# Patient Record
Sex: FEMALE | Race: WHITE
[De-identification: names, ages, dates, MRNs, and addresses within clinical notes are randomized per-mention and may not be internally consistent; named-entity substitution may affect disease eponyms.]

---

## 2017-01-23 ENCOUNTER — HOSPITAL ENCOUNTER (INPATIENT)
Dept: HOSPITAL 47 - EC | Age: 72
LOS: 7 days | Discharge: HOME | DRG: 885 | End: 2017-01-30
Payer: MEDICARE

## 2017-01-23 VITALS — BODY MASS INDEX: 28.8 KG/M2

## 2017-01-23 DIAGNOSIS — G47.00: ICD-10-CM

## 2017-01-23 DIAGNOSIS — F20.0: Primary | ICD-10-CM

## 2017-01-23 DIAGNOSIS — E66.9: ICD-10-CM

## 2017-01-23 DIAGNOSIS — Z79.899: ICD-10-CM

## 2017-01-23 DIAGNOSIS — I11.9: ICD-10-CM

## 2017-01-23 DIAGNOSIS — F41.9: ICD-10-CM

## 2017-01-23 DIAGNOSIS — N39.0: ICD-10-CM

## 2017-01-23 DIAGNOSIS — F32.9: ICD-10-CM

## 2017-01-23 DIAGNOSIS — I95.9: ICD-10-CM

## 2017-01-23 DIAGNOSIS — I87.2: ICD-10-CM

## 2017-01-23 DIAGNOSIS — L30.9: ICD-10-CM

## 2017-01-23 LAB
ALP SERPL-CCNC: 76 U/L (ref 38–126)
ALT SERPL-CCNC: 30 U/L (ref 9–52)
ANION GAP SERPL CALC-SCNC: 13 MMOL/L
AST SERPL-CCNC: 23 U/L (ref 14–36)
BASOPHILS # BLD AUTO: 0.1 K/UL (ref 0–0.2)
BASOPHILS NFR BLD AUTO: 1 %
BUN SERPL-SCNC: 10 MG/DL (ref 7–17)
CALCIUM SPEC-MCNC: 10 MG/DL (ref 8.4–10.2)
CH: 29.5
CHCM: 33.6
CHLORIDE SERPL-SCNC: 107 MMOL/L (ref 98–107)
CO2 SERPL-SCNC: 23 MMOL/L (ref 22–30)
EOSINOPHIL # BLD AUTO: 0.2 K/UL (ref 0–0.7)
EOSINOPHIL NFR BLD AUTO: 2 %
ERYTHROCYTE [DISTWIDTH] IN BLOOD BY AUTOMATED COUNT: 4.15 M/UL (ref 3.8–5.4)
ERYTHROCYTE [DISTWIDTH] IN BLOOD: 15 % (ref 11.5–15.5)
GLUCOSE SERPL-MCNC: 129 MG/DL (ref 74–99)
HCT VFR BLD AUTO: 36.5 % (ref 34–46)
HDW: 2.48
HGB BLD-MCNC: 12.2 GM/DL (ref 11.4–16)
LUC NFR BLD AUTO: 3 %
LYMPHOCYTES # SPEC AUTO: 1.9 K/UL (ref 1–4.8)
LYMPHOCYTES NFR SPEC AUTO: 23 %
MCH RBC QN AUTO: 29.4 PG (ref 25–35)
MCHC RBC AUTO-ENTMCNC: 33.4 G/DL (ref 31–37)
MCV RBC AUTO: 88 FL (ref 80–100)
MONOCYTES # BLD AUTO: 0.5 K/UL (ref 0–1)
MONOCYTES NFR BLD AUTO: 6 %
NEUTROPHILS # BLD AUTO: 5.4 K/UL (ref 1.3–7.7)
NEUTROPHILS NFR BLD AUTO: 65 %
NON-AFRICAN AMERICAN GFR(MDRD): >60
PARTICLE COUNT: 1776
PH UR: 6 [PH] (ref 5–8)
PH UR: 6 [PH] (ref 5–8)
POTASSIUM SERPL-SCNC: 4.1 MMOL/L (ref 3.5–5.1)
PROT SERPL-MCNC: 7.4 G/DL (ref 6.3–8.2)
RBC UR QL: <1 /HPF (ref 0–5)
SODIUM SERPL-SCNC: 143 MMOL/L (ref 137–145)
SP GR UR: 1.01 (ref 1–1.03)
SP GR UR: 1.01 (ref 1–1.03)
UA BILLING (MACRO VS. MICRO): (no result)
UA BILLING (MACRO VS. MICRO): (no result)
UROBILINOGEN UR QL STRIP: <2 MG/DL (ref ?–2)
UROBILINOGEN UR QL STRIP: <2 MG/DL (ref ?–2)
WBC # BLD AUTO: 0.22 10*3/UL
WBC # BLD AUTO: 8.4 K/UL (ref 3.8–10.6)
WBC #/AREA URNS HPF: 31 /HPF (ref 0–5)
WBC (PEROX): 8.33

## 2017-01-23 PROCEDURE — 85025 COMPLETE CBC W/AUTO DIFF WBC: CPT

## 2017-01-23 PROCEDURE — 36415 COLL VENOUS BLD VENIPUNCTURE: CPT

## 2017-01-23 PROCEDURE — 80053 COMPREHEN METABOLIC PANEL: CPT

## 2017-01-23 PROCEDURE — 80306 DRUG TEST PRSMV INSTRMNT: CPT

## 2017-01-23 PROCEDURE — 87086 URINE CULTURE/COLONY COUNT: CPT

## 2017-01-23 PROCEDURE — 82075 ASSAY OF BREATH ETHANOL: CPT

## 2017-01-23 PROCEDURE — 81001 URINALYSIS AUTO W/SCOPE: CPT

## 2017-01-23 PROCEDURE — 99285 EMERGENCY DEPT VISIT HI MDM: CPT

## 2017-01-23 RX ADMIN — DOCUSATE SODIUM AND SENNOSIDES SCH EACH: 50; 8.6 TABLET ORAL at 20:54

## 2017-01-23 NOTE — ED
Psych HPI





- General


Chief Complaint: Psychiatric Symptoms


Stated Complaint: mental health


Time Seen by Provider: 01/23/17 13:55


Source: patient, RN notes reviewed


Mode of arrival: wheelchair





- History of Present Illness


Initial Comments: 





Patient is a 71-year-old female presents emergency room for psych evaluation.  

Patient states she has a history of "mental breakdowns".  Patient states she is 

currently in the middle of having one.  Patient states she called over to Dr. Rogers's office and she was advised to come to the emergency room for possible 

psych admission.  Patient states she takes Abilify as directed and Seroquel at 

bedtime.  Patient states she takes Ativan as needed.  Patient states that she 

feels unsafe at home.  Patient denies suicidal or homicidal ideations.  Patient 

denies any medical history.  Patient denies chest pain, shortness of breath, 

headache, dizziness, fevers, chills.





- Related Data


 Home Medications











 Medication  Instructions  Recorded  Confirmed


 


Cholecalciferol [Vitamin D3] 5,000 unit PO DAILY 11/14/16 01/23/17


 


Enalapril [Vasotec] 20 mg PO DAILY 11/14/16 01/23/17


 


Furosemide [Lasix] 20 mg PO DAILY@1200 11/14/16 01/23/17


 


ARIPiprazole [Abilify] 10 mg PO DAILY 01/23/17 01/23/17


 


ARIPiprazole [Abilify] 15 mg PO HS 01/23/17 01/23/17


 


Sennosides-Docusate Sodium 1 tab PO BID 01/23/17 01/23/17





[Senokot-S]   








 Previous Rx's











 Medication  Instructions  Recorded


 


LORazepam [Ativan] 1 mg PO BID #60 tab 11/29/16


 


QUEtiapine FUMARATE [SEROquel] 600 mg PO HS #60 tablet 11/29/16











 Allergies











Allergy/AdvReac Type Severity Reaction Status Date / Time


 


No Known Allergies Allergy   Verified 01/23/17 14:43














Review of Systems


ROS Statement: 


Those systems with pertinent positive or pertinent negative responses have been 

documented in the HPI.





ROS Other: All systems not noted in ROS Statement are negative.





Past Medical History


Past Medical History: Hypertension


History of Any Multi-Drug Resistant Organisms: None Reported


Past Surgical History: No Surgical Hx Reported


Past Psychological History: Anxiety, Depression


Smoking Status: Never smoker


Past Alcohol Use History: None Reported


Past Drug Use History: None Reported





General Exam





- General Exam Comments


Initial Comments: 





Sitting exam room, anxious.


General appearance: alert, anxious


Head exam: Present: atraumatic, normocephalic, normal inspection


Eye exam: Present: normal appearance


ENT exam: Present: normal exam


Neck exam: Present: normal inspection


Respiratory exam: Present: normal lung sounds bilaterally.  Absent: respiratory 

distress


Cardiovascular Exam: Present: normal rhythm, tachycardia, normal heart sounds


Extremities exam: Present: normal inspection


Back exam: Present: normal inspection


Neurological exam: Present: alert, oriented X3


Psychiatric exam: Present: normal affect, anxious


  ** Expanded


Focused psych exam: Present: paranoid, restlessness


Skin exam: Present: warm, dry, intact, normal color.  Absent: rash





Course


 Vital Signs











  01/23/17 01/23/17 01/23/17





  10:00 17:10 18:37


 


Temperature 97.9 F 97.6 F 97.6 F


 


Pulse Rate 105 H 93 75


 


Respiratory 16 16 16





Rate   


 


Blood Pressure 144/78 139/68 140/76


 


O2 Sat by Pulse 94 L 96 97





Oximetry   














Medical Decision Making





- Medical Decision Making





Patient is a 71-year-old female presents to the emergency room for evaluation.  

Labs show no acute findings.  Patient denies any urinary symptoms.  Will 

culture urine.  Patient evaluated by psych and meets admission criteria.





- Lab Data


Result diagrams: 


 01/23/17 15:10





 01/23/17 15:10


 Lab Results











  01/23/17 01/23/17 01/23/17 Range/Units





  15:00 15:10 15:10 


 


WBC   8.4   (3.8-10.6)  k/uL


 


RBC   4.15   (3.80-5.40)  m/uL


 


Hgb   12.2   (11.4-16.0)  gm/dL


 


Hct   36.5   (34.0-46.0)  %


 


MCV   88.0   (80.0-100.0)  fL


 


MCH   29.4   (25.0-35.0)  pg


 


MCHC   33.4   (31.0-37.0)  g/dL


 


RDW   15.0   (11.5-15.5)  %


 


Plt Count   310   (150-450)  k/uL


 


Neutrophils %   65   %


 


Lymphocytes %   23   %


 


Monocytes %   6   %


 


Eosinophils %   2   %


 


Basophils %   1   %


 


Neutrophils #   5.4   (1.3-7.7)  k/uL


 


Lymphocytes #   1.9   (1.0-4.8)  k/uL


 


Monocytes #   0.5   (0-1.0)  k/uL


 


Eosinophils #   0.2   (0-0.7)  k/uL


 


Basophils #   0.1   (0-0.2)  k/uL


 


Sodium    143  (137-145)  mmol/L


 


Potassium    4.1  (3.5-5.1)  mmol/L


 


Chloride    107  ()  mmol/L


 


Carbon Dioxide    23  (22-30)  mmol/L


 


Anion Gap    13  mmol/L


 


BUN    10  (7-17)  mg/dL


 


Creatinine    0.80  (0.52-1.04)  mg/dL


 


Est GFR (MDRD) Af Amer    >60  (>60 ml/min/1.73 sqM)  


 


Est GFR (MDRD) Non-Af    >60  (>60 ml/min/1.73 sqM)  


 


Glucose    129 H  (74-99)  mg/dL


 


Calcium    10.0  (8.4-10.2)  mg/dL


 


Total Bilirubin    0.7  (0.2-1.3)  mg/dL


 


AST    23  (14-36)  U/L


 


ALT    30  (9-52)  U/L


 


Alkaline Phosphatase    76  ()  U/L


 


Total Protein    7.4  (6.3-8.2)  g/dL


 


Albumin    4.2  (3.5-5.0)  g/dL


 


Urine Color  Yellow    


 


Urine Appearance  Clear    (Clear)  


 


Urine pH  6.0    (5.0-8.0)  


 


Ur Specific Gravity  1.008    (1.001-1.035)  


 


Urine Protein  Negative    (Negative)  


 


Urine Glucose (UA)  Negative    (Negative)  


 


Urine Ketones  Negative    (Negative)  


 


Urine Blood  Negative    (Negative)  


 


Urine Nitrate  Negative    (Negative)  


 


Urine Bilirubin  Negative    (Negative)  


 


Urine Urobilinogen  <2.0    (<2.0)  mg/dL


 


Ur Leukocyte Esterase  Large H    (Negative)  


 


Urine RBC     (0-5)  /hpf


 


Urine WBC     (0-5)  /hpf


 


Urine Mucus     (None)  /hpf


 


Urine Opiates Screen  Not Detected    (NotDetected)  


 


Ur Oxycodone Screen  Not Detected    (NotDetected)  


 


Urine Methadone Screen  Not Detected    (NotDetected)  


 


Ur Propoxyphene Screen  Not Detected    (NotDetected)  


 


Ur Barbiturates Screen  Not Detected    (NotDetected)  


 


U Tricyclic Antidepress  Not Detected    (NotDetected)  


 


Ur Phencyclidine Scrn  Not Detected    (NotDetected)  


 


Ur Amphetamines Screen  Not Detected    (NotDetected)  


 


U Methamphetamines Scrn  Not Detected    (NotDetected)  


 


U Benzodiazepines Scrn  Detected H    (NotDetected)  


 


Urine Cocaine Screen  Not Detected    (NotDetected)  


 


U Marijuana (THC) Screen  Not Detected    (NotDetected)  














  01/23/17 Range/Units





  17:50 


 


WBC   (3.8-10.6)  k/uL


 


RBC   (3.80-5.40)  m/uL


 


Hgb   (11.4-16.0)  gm/dL


 


Hct   (34.0-46.0)  %


 


MCV   (80.0-100.0)  fL


 


MCH   (25.0-35.0)  pg


 


MCHC   (31.0-37.0)  g/dL


 


RDW   (11.5-15.5)  %


 


Plt Count   (150-450)  k/uL


 


Neutrophils %   %


 


Lymphocytes %   %


 


Monocytes %   %


 


Eosinophils %   %


 


Basophils %   %


 


Neutrophils #   (1.3-7.7)  k/uL


 


Lymphocytes #   (1.0-4.8)  k/uL


 


Monocytes #   (0-1.0)  k/uL


 


Eosinophils #   (0-0.7)  k/uL


 


Basophils #   (0-0.2)  k/uL


 


Sodium   (137-145)  mmol/L


 


Potassium   (3.5-5.1)  mmol/L


 


Chloride   ()  mmol/L


 


Carbon Dioxide   (22-30)  mmol/L


 


Anion Gap   mmol/L


 


BUN   (7-17)  mg/dL


 


Creatinine   (0.52-1.04)  mg/dL


 


Est GFR (MDRD) Af Amer   (>60 ml/min/1.73 sqM)  


 


Est GFR (MDRD) Non-Af   (>60 ml/min/1.73 sqM)  


 


Glucose   (74-99)  mg/dL


 


Calcium   (8.4-10.2)  mg/dL


 


Total Bilirubin   (0.2-1.3)  mg/dL


 


AST   (14-36)  U/L


 


ALT   (9-52)  U/L


 


Alkaline Phosphatase   ()  U/L


 


Total Protein   (6.3-8.2)  g/dL


 


Albumin   (3.5-5.0)  g/dL


 


Urine Color  Yellow  


 


Urine Appearance  Clear  (Clear)  


 


Urine pH  6.0  (5.0-8.0)  


 


Ur Specific Gravity  1.007  (1.001-1.035)  


 


Urine Protein  Negative  (Negative)  


 


Urine Glucose (UA)  Negative  (Negative)  


 


Urine Ketones  Negative  (Negative)  


 


Urine Blood  Negative  (Negative)  


 


Urine Nitrate  Negative  (Negative)  


 


Urine Bilirubin  Negative  (Negative)  


 


Urine Urobilinogen  <2.0  (<2.0)  mg/dL


 


Ur Leukocyte Esterase  Large H  (Negative)  


 


Urine RBC  <1  (0-5)  /hpf


 


Urine WBC  31 H  (0-5)  /hpf


 


Urine Mucus  Rare H  (None)  /hpf


 


Urine Opiates Screen   (NotDetected)  


 


Ur Oxycodone Screen   (NotDetected)  


 


Urine Methadone Screen   (NotDetected)  


 


Ur Propoxyphene Screen   (NotDetected)  


 


Ur Barbiturates Screen   (NotDetected)  


 


U Tricyclic Antidepress   (NotDetected)  


 


Ur Phencyclidine Scrn   (NotDetected)  


 


Ur Amphetamines Screen   (NotDetected)  


 


U Methamphetamines Scrn   (NotDetected)  


 


U Benzodiazepines Scrn   (NotDetected)  


 


Urine Cocaine Screen   (NotDetected)  


 


U Marijuana (THC) Screen   (NotDetected)  














Disposition


Clinical Impression: 


 Schizophrenia





Disposition: ADMITTED AS IP TO [facility] [place]


Condition: Stable


Decision Date: 01/23/17

## 2017-01-24 RX ADMIN — LEVOFLOXACIN SCH MG: 500 TABLET, FILM COATED ORAL at 15:12

## 2017-01-24 RX ADMIN — FUROSEMIDE SCH MG: 20 TABLET ORAL at 12:09

## 2017-01-24 RX ADMIN — DOCUSATE SODIUM AND SENNOSIDES SCH EACH: 50; 8.6 TABLET ORAL at 09:38

## 2017-01-24 RX ADMIN — QUETIAPINE SCH MG: 400 TABLET ORAL at 20:52

## 2017-01-24 RX ADMIN — LISINOPRIL SCH MG: 20 TABLET ORAL at 09:38

## 2017-01-24 RX ADMIN — DOCUSATE SODIUM AND SENNOSIDES SCH EACH: 50; 8.6 TABLET ORAL at 20:52

## 2017-01-24 RX ADMIN — LISINOPRIL SCH: 20 TABLET ORAL at 10:32

## 2017-01-24 RX ADMIN — TRIAMCINOLONE ACETONIDE SCH APPLIC: 1 OINTMENT TOPICAL at 20:52

## 2017-01-24 RX ADMIN — Medication SCH UNIT: at 12:08

## 2017-01-24 NOTE — P.HP
Psychiatric H&P





- .


History & Physical: 


 Allergies











Allergy/AdvReac Type Severity Reaction Status Date / Time


 


No Known Allergies Allergy   Verified 01/23/17 14:43








 Vital Signs











Temp  97.8 F   01/24/17 06:42


 


Pulse  84   01/24/17 06:42


 


Resp  14   01/24/17 06:42


 


BP  103/70   01/24/17 06:42


 


Pulse Ox  95   01/23/17 19:44








 Laboratory Last Values











WBC  8.4 k/uL (3.8-10.6)   01/23/17  15:10    


 


RBC  4.15 m/uL (3.80-5.40)   01/23/17  15:10    


 


Hgb  12.2 gm/dL (11.4-16.0)   01/23/17  15:10    


 


Hct  36.5 % (34.0-46.0)   01/23/17  15:10    


 


MCV  88.0 fL (80.0-100.0)   01/23/17  15:10    


 


MCH  29.4 pg (25.0-35.0)   01/23/17  15:10    


 


MCHC  33.4 g/dL (31.0-37.0)   01/23/17  15:10    


 


RDW  15.0 % (11.5-15.5)   01/23/17  15:10    


 


Plt Count  310 k/uL (150-450)   01/23/17  15:10    


 


Neutrophils %  65 %  01/23/17  15:10    


 


Lymphocytes %  23 %  01/23/17  15:10    


 


Monocytes %  6 %  01/23/17  15:10    


 


Eosinophils %  2 %  01/23/17  15:10    


 


Basophils %  1 %  01/23/17  15:10    


 


Neutrophils #  5.4 k/uL (1.3-7.7)   01/23/17  15:10    


 


Lymphocytes #  1.9 k/uL (1.0-4.8)   01/23/17  15:10    


 


Monocytes #  0.5 k/uL (0-1.0)   01/23/17  15:10    


 


Eosinophils #  0.2 k/uL (0-0.7)   01/23/17  15:10    


 


Basophils #  0.1 k/uL (0-0.2)   01/23/17  15:10    


 


Sodium  143 mmol/L (137-145)   01/23/17  15:10    


 


Potassium  4.1 mmol/L (3.5-5.1)   01/23/17  15:10    


 


Chloride  107 mmol/L ()   01/23/17  15:10    


 


Carbon Dioxide  23 mmol/L (22-30)   01/23/17  15:10    


 


Anion Gap  13 mmol/L  01/23/17  15:10    


 


BUN  10 mg/dL (7-17)   01/23/17  15:10    


 


Creatinine  0.80 mg/dL (0.52-1.04)   01/23/17  15:10    


 


Est GFR (MDRD) Af Amer  >60  (>60 ml/min/1.73 sqM)   01/23/17  15:10    


 


Est GFR (MDRD) Non-Af  >60  (>60 ml/min/1.73 sqM)   01/23/17  15:10    


 


Glucose  129 mg/dL (74-99)  H  01/23/17  15:10    


 


Calcium  10.0 mg/dL (8.4-10.2)   01/23/17  15:10    


 


Total Bilirubin  0.7 mg/dL (0.2-1.3)   01/23/17  15:10    


 


AST  23 U/L (14-36)   01/23/17  15:10    


 


ALT  30 U/L (9-52)   01/23/17  15:10    


 


Alkaline Phosphatase  76 U/L ()   01/23/17  15:10    


 


Total Protein  7.4 g/dL (6.3-8.2)   01/23/17  15:10    


 


Albumin  4.2 g/dL (3.5-5.0)   01/23/17  15:10    


 


Urine Color  Yellow   01/23/17  17:50    


 


Urine Appearance  Clear  (Clear)   01/23/17  17:50    


 


Urine pH  6.0  (5.0-8.0)   01/23/17  17:50    


 


Ur Specific Gravity  1.007  (1.001-1.035)   01/23/17  17:50    


 


Urine Protein  Negative  (Negative)   01/23/17  17:50    


 


Urine Glucose (UA)  Negative  (Negative)   01/23/17  17:50    


 


Urine Ketones  Negative  (Negative)   01/23/17  17:50    


 


Urine Blood  Negative  (Negative)   01/23/17  17:50    


 


Urine Nitrate  Negative  (Negative)   01/23/17  17:50    


 


Urine Bilirubin  Negative  (Negative)   01/23/17  17:50    


 


Urine Urobilinogen  <2.0 mg/dL (<2.0)   01/23/17  17:50    


 


Ur Leukocyte Esterase  Large  (Negative)  H  01/23/17  17:50    


 


Urine RBC  <1 /hpf (0-5)   01/23/17  17:50    


 


Urine WBC  31 /hpf (0-5)  H  01/23/17  17:50    


 


Urine Mucus  Rare /hpf (None)  H  01/23/17  17:50    


 


Urine Opiates Screen  Not Detected  (NotDetected)   01/23/17  15:00    


 


Ur Oxycodone Screen  Not Detected  (NotDetected)   01/23/17  15:00    


 


Urine Methadone Screen  Not Detected  (NotDetected)   01/23/17  15:00    


 


Ur Propoxyphene Screen  Not Detected  (NotDetected)   01/23/17  15:00    


 


Ur Barbiturates Screen  Not Detected  (NotDetected)   01/23/17  15:00    


 


U Tricyclic Antidepress  Not Detected  (NotDetected)   01/23/17  15:00    


 


Ur Phencyclidine Scrn  Not Detected  (NotDetected)   01/23/17  15:00    


 


Ur Amphetamines Screen  Not Detected  (NotDetected)   01/23/17  15:00    


 


U Methamphetamines Scrn  Not Detected  (NotDetected)   01/23/17  15:00    


 


U Benzodiazepines Scrn  Detected  (NotDetected)  H  01/23/17  15:00    


 


Urine Cocaine Screen  Not Detected  (NotDetected)   01/23/17  15:00    


 


U Marijuana (THC) Screen  Not Detected  (NotDetected)   01/23/17  15:00    











01/24/17 09:38


IDENTIFYING DATA: This patient is a 71-year-old single  female who was 

admitted to the mental health unit through the emergency room for an acute 

exacerbation of her psychosis.


HPI: The patient is well known to my outpatient practice and she has had a 

recent admission to this unit back in November.  She has an ongoing diagnosis 

of schizophrenia and anxiety unspecified.  She presented to the emergency room 

on her own reporting fearfulness that people were coming into her apartment and 

raping her at night.  She states that there are 5 individuals after her.  She 

specifically worried about Carson and Willow harming her.  Because of her 

concerns she has not been showering her sleep has been impaired and she has 

been less able to attend to her other activities of daily living.  With her 

last hospitalization we had tried to titrate her Seroquel further that provided 

some mild brief stabilization but her symptoms seem to worsen again.  In the 

outpatient setting we began cross titrating her off of Seroquel onto Abilify.  

Her brother has been involved in her treatment and was present at the last 2 

outpatient appointments.  In terms of hallucinations she reports hearing noises 

she has several specific delusions that are worsened at this time.  She feels 

that she is being raped in the middle the night, she feels people are breaking 

in unplugging her appliances and stealing her things.  She has not been 

sleeping because of these concerns her energy is low and her mood is fearful.  

She is reporting no thoughts of harming others she has no suicidal thoughts.  

Anxiety symptoms are worsened because of her current delusional thought 

content.  She has no firearms at home.


PAST PSYCHIATRIC HISTORY: The patient has had numerous inpatient psychiatric 

admissions the last one was in November 2016.  She is currently on Seroquel 600 

mg at bedtime Abilify 15 mg daily Ativan 1 mg twice daily.  She has been on 

Haldol the past and possibly Risperdal.  She had significant extrapyramidal 

symptoms with Haldol.  No history of suicide attempts.


PMH: Hypertension, possible urinary tract infection


ALLERGIES: NO KNOWN DRUG ALLERGIES


MEDICATIONS: Lasix Norvasc vitamin D Zestril


CHEMICAL DEPENDENCY HISTORY: No use of alcohol marijuana or other illicit 

drugs.  She has never been placed in residential treatment for chemical 

dependency reasons


FAMILY PSYCHIATRIC HISTORY:  None reported, no suicides in the family


FAMILY CHEMICAL DEPENDENCY HISTORY: Reported


SOCIAL HISTORY: The patient is 71 years old she single she has never been 

 and she has no children.  She currently resides at [facility].  

She has a brother who is supportive.  She continues to pay her own bills and up 

until recently was still driving.  She graduated high school and earned a 

bachelor's from [employer] history of  service.  Financially she 

is secure as there was an inheritance that helps cover her expenses.  No legal 

history no history of violence.  No abuse history.  She is not employed.


MENTAL STATUS EXAM: The patient is an overweight  female appearing her 

stated age.  She has a very disheveled appearance and is dressed in layers.  

She is ambulating with a walker and is carrying a large paper bag with her 

eyeglasses and it.  Eye contact is appropriate speech is fluent and spontaneous 

she is verbose but not pressured.  She is perseverative and circumstantial.  

She is focused on her delusional thought content.  For numerous minutes she 

discusses her concerns related to Carson and Willow coming into the hospital 

to get her using alternate identities and disguising themselves.  She reports 

no suicidal or homicidal ideation.  Clearly her psychosis is affecting her 

current ADLs.  Insight and judgment impaired.  She demonstrates no verbal or 

physical aggressiveness or no signs of EPS.  She is alert and oriented to 

person place and date she is able to spell world backwards.  She is able to 

name 3 objects after delay of approximately 3 minutes.  Affect is mildly labile 

she can be bright at times and often appears fearful in discussing her 

delusional thoughts.


STRENGTHS/WEAKNESSES: Strengths: Housing, income, willingness to receive 

treatment, support from family weaknesses: Ongoing adverse impact of psychotic 

symptoms on her function


INTELLECTUAL FUNCTIONING: Average


IMPRESSIONS: []


1.  Schizophrenia, anxiety unspecified


2.  Possible urinary tract infection, hypertension


3.  Psychosocial dysfunction due to psychotic symptoms





PLAN: The patient has been admitted to the mental health unit voluntarily.  We 

reviewed her symptoms and medication options.  I will reduce the Seroquel 

further to 400 mg at bedtime and increase her Abilify to 20 mg daily.  We will 

request a routine medical consultation and requests treatment of the presumed 

UTI.  Social work will meet with the patient for psychosocial assessment and 

begin discharge planning.  We'll involve her brother in her care as she will 

allow.  She is encouraged to participate in groups as appropriate we will 

provide reality orientation when possible we will monitor her for safety.

## 2017-01-24 NOTE — P.CON
Consult Note





- .


Assessment/Plan:: 





This is a dictation, consult requested by Dr. Riley, date of service is 01/24/ 2017.  Patient seen and evaluated face-to-face.


Patient admitted to mental health unit with the underlying exacerbation of her 

paranoid schizophrenia and she has been complaining of some people turn off her 

refrigerator at home while she was sleeping she never heard that the, and or 

open the door and she stated that the drug that her or anesthetic her.





Patient to have similar symptoms at other senior assisting living resulted and 

changing her apartment to the new order place but still have the same 

exacerbation after she improved with her lost admission.  She denied any cough 

or cold denied any fever or chills no hematemesis or melena or hematochezia, 

she denied any urinary infection.  However the urine analysis indicating that 

she had large leukocyte and the white count in the urine was 31.  Her 

laboratory on admission indicating that white count was 8.4 with hemoglobin 

12.2 hematocrit is 36.5 and MCV 88 and platelet count was normal 310 and the 

differential was normal her chemistry indicating sodium 143 potassium 4.1 

chloride 107 carbon dioxide 23 BUN/creatinine 10 and creatinine 0.8 with the 

estimated glomerular filtration rate for non- more more than 60.  

Glucose 129 bilirubin 0.7 AST a LT within normal limit including the alkaline 

phosphatase.





Her current list of medication amlodipine 10 mg daily Lasix 20 mg daily 

enalapril 20 mg daily and vitamin D 3 5000 once a day she was using  Seroquel 

300 mg a tablet but she was taken 2 tablets at bedtime.





Discussion with the nurses indicating that her blood pressure is dropping down 

and the held the lisinopril 20 mg today until adjustment.  Patient also had dry 

Mrs. Bradley ointment applied on the previous healed with the neuropathy on the 

shingle that she had in her last admission to the hospital with the previous 

exacerbation.





Patient has chronic right ankle ulcer has been healed and the recommendation by 

Dr. surgeon ALVAREZ, to avoid washing the right leg without the shaft protector 

to avoid moist on the ulcer will result on break again the ulcer.





Past medical history #1 bipolar versus not annoyed schizophrenia with the 

current diagnoses paranoid schizophrenia, anxiety disorder, insomnia, 

schizoaffective disorder, bilateral venous insufficiency.





On the current review of system was -14 point.  Physical exam vital signs 

stable however the blood pressure on the lower side and I adjusted the 

lisinopril.  HEENT negative neck was supple no lymphadenopathy trachea midline.


Chest clear to auscultation and percussion there is a rash on the back left 

sided extended to the left breast, that he has healed shingle however post 

herpetic has been present with the sensation of burning and itching lung is  no 

wheezes no rhonchi's mild kyphosis,


Heart is regular sinus rhythm with a PMI in the fifth intercostal space outside 

midclavicular line normal S1 and S2 no gallop.  Heart is compensated.


Abdomen obese positive bowel sounds no organ enlargement no suprapubic 

tenderness.  Extremities right leg there is erythema with line of demarcation 

with a history of ulcer on the ankle medial side was treated by the vascular 

surgeon .  Pulses is intact underlying venous insufficiency of the 

lower extremities.


Neurological exam stable ambulatory with walker wearing 4 extremities no 

lateralizing sign sensation intact and no ataxia.


Assessment:


#1 acute exacerbation of paranoid schizophrenia


#2 probably UTI however we don't have the culture yet and will start 

empirically treatment according to the culture and sensitivity will change her 

medication.


#3 hypertension with hypertensive heart disease.  However currently patient 

normotensive to hypotensive and will adjust lisinopril for now.  Number


#4 bilateral venous insufficiency, chronic ulcer of the right ankle, chronic 

erythema of the right leg.


Recommendation and plan: #1 adjust the lisinopril dose to 5 mg.  #2 start 

treatment with ointment of try medicine along twice a day on the left back and 

the breath left breast.  Using the right leg protector.


#5 start Cipro 500 mg twice a day for UTI until we receive the urine culture 

and adjust accordingly.


Thank you Dr. Riley for letting me participate in the care of your patient.

## 2017-01-25 RX ADMIN — DOCUSATE SODIUM AND SENNOSIDES SCH EACH: 50; 8.6 TABLET ORAL at 20:41

## 2017-01-25 RX ADMIN — LISINOPRIL SCH MG: 5 TABLET ORAL at 09:06

## 2017-01-25 RX ADMIN — QUETIAPINE SCH MG: 400 TABLET ORAL at 20:41

## 2017-01-25 RX ADMIN — Medication SCH UNIT: at 12:49

## 2017-01-25 RX ADMIN — TRIAMCINOLONE ACETONIDE SCH APPLIC: 1 OINTMENT TOPICAL at 21:09

## 2017-01-25 RX ADMIN — FUROSEMIDE SCH MG: 20 TABLET ORAL at 12:49

## 2017-01-25 RX ADMIN — LEVOFLOXACIN SCH MG: 500 TABLET, FILM COATED ORAL at 14:48

## 2017-01-25 RX ADMIN — TRIAMCINOLONE ACETONIDE SCH APPLIC: 1 OINTMENT TOPICAL at 09:07

## 2017-01-25 RX ADMIN — DOCUSATE SODIUM AND SENNOSIDES SCH EACH: 50; 8.6 TABLET ORAL at 09:06

## 2017-01-25 NOTE — P.PN
Subjective





This is a dictation on the progress note dated 01/25/2017, at mental health 

unit.


Patient seen and evaluated in face-to-face discussion,


Her urine analysis showed increased white count however the culture so far no 

evidence of infection, we will continue the antibiotic until the final of the 

culture.


Her blood pressure was fluctuated and the the trend to low blood pressure with 

the antipsychotic medication, we will decrease the amlodipine to 5 mg daily at 

bedtime.


Patient seen and evaluated she is requesting to go home with the concerns of 

insurance however it is not valid request as patient need to be completely and 

psychiatrist cleared her to be going home as well as the  with the 

underlying paranoid schizophrenia with exacerbation.  On the physical exam today


Her HEENT was negative oropharynx normal, neck was supple no JVD no thyromegaly 

no lymphadenopathy.


Chest was clear to auscultation and percussion no arrhythmias.


Heart regular sinus rhythm.


Abdomen is soft positive bowel sounds.  Extremities right ankle and leg 

erythema chronically present and scab on the right ankle ulcer which was 

treated by vascular surgeon .


Patient ambulatory using walker.  Assessment her acute episode of exacerbation 

of schizophrenia and paranoid has not been completely resolved yet and she is 

followed by Dr. Riley psychiatrist and she is in the mental health unit.


Hypotension: Medication adjusted and continue to be monitored.


UTI currently on levofloxacin 500 mg once a day as Cipro not available in the 

hospital, so far waiting for the final culture currently negative for 18 hours.





Objective





- Vital Signs


Vital signs: 


 Vital Signs











Temp  97.9 F   01/25/17 05:34


 


Pulse  100   01/25/17 05:34


 


Resp  20   01/25/17 05:34


 


BP  132/62   01/25/17 05:34


 


Pulse Ox  95   01/23/17 19:44














- Labs


CBC & Chem 7: 


 01/23/17 15:10





 01/23/17 15:10
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
## 2017-01-26 RX ADMIN — LISINOPRIL SCH MG: 5 TABLET ORAL at 09:27

## 2017-01-26 RX ADMIN — LEVOFLOXACIN SCH MG: 500 TABLET, FILM COATED ORAL at 13:52

## 2017-01-26 RX ADMIN — DOCUSATE SODIUM AND SENNOSIDES SCH EACH: 50; 8.6 TABLET ORAL at 09:27

## 2017-01-26 RX ADMIN — TRIAMCINOLONE ACETONIDE SCH APPLIC: 1 OINTMENT TOPICAL at 09:28

## 2017-01-26 RX ADMIN — FUROSEMIDE SCH MG: 20 TABLET ORAL at 12:02

## 2017-01-26 RX ADMIN — TRIAMCINOLONE ACETONIDE SCH APPLIC: 1 OINTMENT TOPICAL at 20:29

## 2017-01-26 RX ADMIN — QUETIAPINE SCH MG: 400 TABLET ORAL at 20:27

## 2017-01-26 RX ADMIN — Medication SCH UNIT: at 12:01

## 2017-01-26 RX ADMIN — DOCUSATE SODIUM AND SENNOSIDES SCH EACH: 50; 8.6 TABLET ORAL at 20:27

## 2017-01-26 NOTE — P.PN
Progress Note - Text





Interval history: The patient is found in the hallway she follows me to an 

interview room.  She states her insurance bill for [employer] is due and wants 

to be discharged so she can go home and pay it.  For several minutes she 

discusses her concern over her male and goes through the calendar dates several 

times.  She remains compliant with medication.  It is documented that she did 

not sleep last night.  She remains preoccupied with her safety and that people 

would try to do her harm at her place of residence.  She reports that she is 

eating.  Social work has made contact with her brother who is her primary 

support.  We discussed our plan to continue cross tapering onto Abilify and off 

of Seroquel.





Mental status exam: The patient is an overweight  female she is 

ambulating with a walker.  She is dressed in layers.  She is trying to utilize 

a walker while carrying a cup of water and a large brown bag the only has her 

eyeglasses and it.  Eye contact is appropriate speech is spontaneous and fluent 

she is verbose.  Thought process is very perseverative.  She continues to have 

paranoid and persecutory thoughts.  She reports no suicidal or homicidal 

ideation intent or plan.  Insight and judgment are impaired.  She is 

demonstrating no overt manic symptoms but we will need to monitor her insomnia.

  She is oriented to person place and date.  She demonstrates no verbal or 

physical aggressiveness.  She is demonstrating no signs of EPS.





Plan: The patient will continue on Abilify we will increase to 30 mg daily we 

will consider lowering the Seroquel further.  There is concern about her not 

sleeping at night.  She requires continued psychiatric hospitalization for her 

symptoms of psychosis.  Her primary care physician has seen her and lowered her 

blood pressure medicine and currently has run Levaquin for a presumed urinary 

tract infection culture is pending.  Vital signs are reviewed.  We will 

continue to monitor her for safety and encourage her participation in the 

milieu.  Social work has made contact with her brother as of yesterday we will 

continue to include him in treatment and discharge planning.

## 2017-01-27 RX ADMIN — LISINOPRIL SCH MG: 5 TABLET ORAL at 08:15

## 2017-01-27 RX ADMIN — DOCUSATE SODIUM AND SENNOSIDES SCH EACH: 50; 8.6 TABLET ORAL at 21:11

## 2017-01-27 RX ADMIN — Medication SCH UNIT: at 13:10

## 2017-01-27 RX ADMIN — FUROSEMIDE SCH MG: 20 TABLET ORAL at 13:10

## 2017-01-27 RX ADMIN — TRIAMCINOLONE ACETONIDE SCH APPLIC: 1 OINTMENT TOPICAL at 08:16

## 2017-01-27 RX ADMIN — DOCUSATE SODIUM AND SENNOSIDES SCH EACH: 50; 8.6 TABLET ORAL at 08:15

## 2017-01-27 RX ADMIN — TRIAMCINOLONE ACETONIDE SCH APPLIC: 1 OINTMENT TOPICAL at 21:13

## 2017-01-27 RX ADMIN — LEVOFLOXACIN SCH MG: 500 TABLET, FILM COATED ORAL at 13:10

## 2017-01-27 RX ADMIN — QUETIAPINE SCH MG: 400 TABLET ORAL at 21:11
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
## 2017-01-28 RX ADMIN — LEVOFLOXACIN SCH MG: 500 TABLET, FILM COATED ORAL at 13:56

## 2017-01-28 RX ADMIN — FUROSEMIDE SCH MG: 20 TABLET ORAL at 13:51

## 2017-01-28 RX ADMIN — TRIAMCINOLONE ACETONIDE SCH APPLIC: 1 OINTMENT TOPICAL at 20:44

## 2017-01-28 RX ADMIN — LISINOPRIL SCH MG: 5 TABLET ORAL at 09:10

## 2017-01-28 RX ADMIN — Medication SCH UNIT: at 13:50

## 2017-01-28 RX ADMIN — DOCUSATE SODIUM AND SENNOSIDES SCH EACH: 50; 8.6 TABLET ORAL at 09:10

## 2017-01-28 RX ADMIN — TRIAMCINOLONE ACETONIDE SCH APPLIC: 1 OINTMENT TOPICAL at 09:10

## 2017-01-28 RX ADMIN — DOCUSATE SODIUM AND SENNOSIDES SCH EACH: 50; 8.6 TABLET ORAL at 20:44

## 2017-01-28 RX ADMIN — QUETIAPINE SCH MG: 400 TABLET ORAL at 20:44

## 2017-01-28 NOTE — P.PN
Subjective





This is a dictation on vandana Persaud date of service 01/28/2017 progress note 

dictated by Dr. Doris JEAN [facility]. 


Patient seen and evaluated face-to-face, discussed with the patient that 

culture result was negative of her urine and will discontinue the levofloxacin 

and she received 4 tablets which is enough for the course.


Her blood pressure has been stable 130/80 with the adjustment on the medication 

and decrease did the ACE inhibitor lisinopril/Vasotec/Zestril/Prinivil to 5 mg 

once a day prescription and send to [facility] with 90 tablets and 3 

refills.


No change in her other medication medically including the Lasix 20 mg daily as 

well as Norvasc 10 mg at at bedtime daily and that she had enough of these 

medication at home.  Also she had that appointment at home as well.


On exam she is comfortable ambulatory no evidence of dysuria or hematuria and 

she stated she just urinate and no problem, no cough or expectoration and no 

pain.  Today stated that she is much better not seeing much of people no much 

of evidence of hallucination visual or auditory, and she wants to go home on 

Monday, January 30 and this stated that her brother will be common at noontime.





Her HEENT negative normal oropharynx, pupil is equal reactive and good eye 

contact, neck was supple no lymphadenopathy.  No thyromegaly.


Chest clear to auscultation and percussion.  Heart regular sinus rhythm.  

Abdomen is soft positive bowel sounds and no symptoms of constipation or 

diarrhea.  Extremities she had chronic right ankle ulcer, and chronic 

dermatitis.  No edema.  Left lower extremities is normal.  Blood flow and 

perfusion is normal.


Patient ambulate with walker.


Assessment:


UTI on admission with the white count in the urine was present however the 

culture was negative and patient treated already with the Levaquin for 4 days 

and will be stopped the treatment at this time.  #2 hypertension with 

hypertensive heart disease and fluctuation of the blood pressure currently 

stable was 130/80 on lisinopril 5 mg tablet daily with the use of her home 

medication l Norvasc, Lasix.


#3 acute episode exacerbation of schizophrenia paranoid type.  #4 severe 

anxiety disorder.





Plan: Patient in the mental health unit and the under care of Dr. Riley, her 

psychiatric medication will be rendered by Dr. Riley.


I did send her new prescription to [facility], lisinopril 5 mg daily 90 

tablets with 3 refills.


Also discontinued her levofloxacin today.


Patient will be ready for any discharge possibility by Monday when the 

psychiatric evaluation indicate for discharge from the medical point of view 

patient is currently stable.





Objective





- Vital Signs


Vital signs: 


 Vital Signs











Temp  98.2 F   01/28/17 02:46


 


Pulse  84   01/28/17 09:11


 


Resp  18   01/28/17 09:11


 


BP  130/80   01/28/17 09:11


 


Pulse Ox  98   01/27/17 08:13








 Intake & Output











 01/27/17 01/28/17 01/28/17





 18:59 06:59 18:59


 


Weight 86.183 kg  














- Labs


CBC & Chem 7: 


 01/23/17 15:10





 01/23/17 15:10

## 2017-01-28 NOTE — P.PN
Progress Note - Text





Interval history: Patient seen in cross coverage today for Dr. Riley.  She 

seems to reports that see if sleeping and eating well.  She talks about being 

seen by Dr. George today.  She makes reference to being off of her antibiotic 

and 1 of her blood pressure medications being decreased.  She makes reference 

to being discharged on Monday.  She does not seem to voice any adverse 

psychotropic medication side effects.





Mental status exam: She is alert and cooperative with the interview.  Her 

speech is fluent somewhat rapid at times.  She is somewhat repetitive at times.

  She describes her mood as happy.  She does not verbalize any thoughts of harm 

to self or others.  She does not verbalize any hallucinations.  She does not 

show any significant agitation.





Plan: We'll maintain current psychotropic medications.  Monitor for any 

medication side effects.  We'll continue to cover this patient for Dr. Riley 

through the weekend.

## 2017-01-29 VITALS — RESPIRATION RATE: 18 BRPM

## 2017-01-29 RX ADMIN — TRIAMCINOLONE ACETONIDE SCH APPLIC: 1 OINTMENT TOPICAL at 08:56

## 2017-01-29 RX ADMIN — QUETIAPINE SCH MG: 400 TABLET ORAL at 20:12

## 2017-01-29 RX ADMIN — DOCUSATE SODIUM AND SENNOSIDES SCH: 50; 8.6 TABLET ORAL at 20:11

## 2017-01-29 RX ADMIN — DOCUSATE SODIUM AND SENNOSIDES SCH EACH: 50; 8.6 TABLET ORAL at 08:56

## 2017-01-29 RX ADMIN — LISINOPRIL SCH MG: 5 TABLET ORAL at 08:55

## 2017-01-29 RX ADMIN — Medication SCH UNIT: at 12:16

## 2017-01-29 RX ADMIN — TRIAMCINOLONE ACETONIDE SCH: 1 OINTMENT TOPICAL at 20:12

## 2017-01-29 RX ADMIN — FUROSEMIDE SCH MG: 20 TABLET ORAL at 12:17

## 2017-01-29 NOTE — P.PN
Progress Note - Text





Interval history: Patient seen in cross coverage today for Dr. Riley.  She 

reports that she did sleep last night and is eating.  She does not voice any 

adverse psychotropic medication side effects, she makes reference to having had 

a nervous breakdown in the past.  She appears agreeable to taking a shower today

, the staff were preparing for her.





Mental status exam: She is alert and cooperative with the interview.  Her 

affect is slightly restricted.  She denies any thoughts of harm to self or 

others.  Her mood overall seems to be improved.  She denies any paranoid 

thoughts, relays that nobody is going to hurt her.  She denies any 

hallucinations.  She does perseverate at the end of the session to some degree 

regarding her medication changes from a medical standpoint.





Plan: We'll maintain current psychotropic medication regimen at this time.  

Continue to monitor for any medication side effects.  Dr. Riley to resume care 

of this patient starting tomorrow.

## 2017-01-30 VITALS — HEART RATE: 79 BPM | SYSTOLIC BLOOD PRESSURE: 114 MMHG | DIASTOLIC BLOOD PRESSURE: 59 MMHG

## 2017-01-30 VITALS — TEMPERATURE: 98.2 F

## 2017-01-30 RX ADMIN — FUROSEMIDE SCH MG: 20 TABLET ORAL at 12:01

## 2017-01-30 RX ADMIN — Medication SCH UNIT: at 12:01

## 2017-01-30 RX ADMIN — TRIAMCINOLONE ACETONIDE SCH APPLIC: 1 OINTMENT TOPICAL at 08:36

## 2017-01-30 RX ADMIN — DOCUSATE SODIUM AND SENNOSIDES SCH EACH: 50; 8.6 TABLET ORAL at 08:36

## 2017-01-30 RX ADMIN — LISINOPRIL SCH MG: 5 TABLET ORAL at 08:36
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
## 2017-02-03 ENCOUNTER — HOSPITAL ENCOUNTER (EMERGENCY)
Dept: HOSPITAL 47 - EC | Age: 72
Discharge: HOME | End: 2017-02-03
Payer: MEDICARE

## 2017-02-03 VITALS — SYSTOLIC BLOOD PRESSURE: 110 MMHG | TEMPERATURE: 98 F | DIASTOLIC BLOOD PRESSURE: 68 MMHG | HEART RATE: 100 BPM

## 2017-02-03 VITALS — RESPIRATION RATE: 20 BRPM

## 2017-02-03 DIAGNOSIS — N39.0: Primary | ICD-10-CM

## 2017-02-03 DIAGNOSIS — Z79.899: ICD-10-CM

## 2017-02-03 DIAGNOSIS — F22: ICD-10-CM

## 2017-02-03 LAB
ALP SERPL-CCNC: 71 U/L (ref 38–126)
ALT SERPL-CCNC: 31 U/L (ref 9–52)
ANION GAP SERPL CALC-SCNC: 12 MMOL/L
AST SERPL-CCNC: 23 U/L (ref 14–36)
BASOPHILS # BLD AUTO: 0.1 K/UL (ref 0–0.2)
BASOPHILS NFR BLD AUTO: 1 %
BUN SERPL-SCNC: 21 MG/DL (ref 7–17)
CALCIUM SPEC-MCNC: 9.8 MG/DL (ref 8.4–10.2)
CH: 29.8
CHCM: 34
CHLORIDE SERPL-SCNC: 101 MMOL/L (ref 98–107)
CO2 SERPL-SCNC: 24 MMOL/L (ref 22–30)
EOSINOPHIL # BLD AUTO: 0.1 K/UL (ref 0–0.7)
EOSINOPHIL NFR BLD AUTO: 1 %
ERYTHROCYTE [DISTWIDTH] IN BLOOD BY AUTOMATED COUNT: 3.97 M/UL (ref 3.8–5.4)
ERYTHROCYTE [DISTWIDTH] IN BLOOD: 15.1 % (ref 11.5–15.5)
GLUCOSE SERPL-MCNC: 138 MG/DL (ref 74–99)
HCT VFR BLD AUTO: 34.9 % (ref 34–46)
HDW: 2.45
HGB BLD-MCNC: 11.7 GM/DL (ref 11.4–16)
LUC NFR BLD AUTO: 3 %
LYMPHOCYTES # SPEC AUTO: 1.4 K/UL (ref 1–4.8)
LYMPHOCYTES NFR SPEC AUTO: 21 %
MCH RBC QN AUTO: 29.3 PG (ref 25–35)
MCHC RBC AUTO-ENTMCNC: 33.4 G/DL (ref 31–37)
MCV RBC AUTO: 87.9 FL (ref 80–100)
MONOCYTES # BLD AUTO: 0.6 K/UL (ref 0–1)
MONOCYTES NFR BLD AUTO: 8 %
NEUTROPHILS # BLD AUTO: 4.3 K/UL (ref 1.3–7.7)
NEUTROPHILS NFR BLD AUTO: 66 %
NON-AFRICAN AMERICAN GFR(MDRD): 44
PARTICLE COUNT: (no result)
PH UR: 5.5 [PH] (ref 5–8)
POTASSIUM SERPL-SCNC: 3.4 MMOL/L (ref 3.5–5.1)
PROT SERPL-MCNC: 7.1 G/DL (ref 6.3–8.2)
PROT UR QL: (no result)
SODIUM SERPL-SCNC: 137 MMOL/L (ref 137–145)
SP GR UR: 1.02 (ref 1–1.03)
SQUAMOUS UR QL AUTO: 14 /HPF (ref 0–4)
UA BILLING (MACRO VS. MICRO): (no result)
UROBILINOGEN UR QL STRIP: <2 MG/DL (ref ?–2)
WBC # BLD AUTO: 0.18 10*3/UL
WBC # BLD AUTO: 6.6 K/UL (ref 3.8–10.6)
WBC #/AREA URNS HPF: >182 /HPF (ref 0–5)
WBC (PEROX): 7.08

## 2017-02-03 PROCEDURE — 80053 COMPREHEN METABOLIC PANEL: CPT

## 2017-02-03 PROCEDURE — 81001 URINALYSIS AUTO W/SCOPE: CPT

## 2017-02-03 PROCEDURE — 80320 DRUG SCREEN QUANTALCOHOLS: CPT

## 2017-02-03 PROCEDURE — 87086 URINE CULTURE/COLONY COUNT: CPT

## 2017-02-03 PROCEDURE — 85025 COMPLETE CBC W/AUTO DIFF WBC: CPT

## 2017-02-03 PROCEDURE — 99284 EMERGENCY DEPT VISIT MOD MDM: CPT

## 2017-02-03 PROCEDURE — 80306 DRUG TEST PRSMV INSTRMNT: CPT

## 2017-02-03 PROCEDURE — 93005 ELECTROCARDIOGRAM TRACING: CPT

## 2017-02-03 PROCEDURE — 82075 ASSAY OF BREATH ETHANOL: CPT

## 2017-02-03 PROCEDURE — 36415 COLL VENOUS BLD VENIPUNCTURE: CPT

## 2017-02-03 NOTE — ED
General Adult HPI





- General


Chief complaint: Psychiatric Symptoms


Stated complaint: MENTAL HEALTH


Time Seen by Provider: 02/03/17 11:53


Source: patient, RN notes reviewed


Mode of arrival: ambulatory


Limitations: no limitations





- History of Present Illness


Initial comments: 





Patient 71-year-old female who presents emergency room today with a chief 

complaint of needing a mental health evaluation.  She states she was told by 

her psychiatrist that she could come here to the emergency room to be 

evaluated.  She states she's had increased paranoid thoughts.  States she's 

been taking her medications of Seroquel and Abilify at home as prescribed.  

Patient denies any suicidal or homicidal thoughts or plans.  Denies any 

auditory or visual hallucinations.  Denies any other physical complaints. 

Patient denies any recent fever, chills, shortness of breath, chest pain, back 

pain, abdominal pain, nausea or vomiting, numbness or tingling, dysuria or 

hematuria, constipation or diarrhea, headaches or visual changes, or any other 

complaints.





- Related Data


 Home Medications











 Medication  Instructions  Recorded  Confirmed


 


Cholecalciferol [Vitamin D3] 5,000 unit PO DAILY 11/14/16 02/03/17


 


Furosemide [Lasix] 20 mg PO DAILY@1200 11/14/16 02/03/17


 


Sennosides-Docusate Sodium 1 tab PO BID 01/23/17 02/03/17





[Senokot-S]   


 


Triamcinolone 0.1% Ointment 1 applic TOPICAL BID PRN 02/03/17 02/03/17





[Kenalog 0.1% Ointment]   








 Previous Rx's











 Medication  Instructions  Recorded


 


LORazepam [Ativan] 1 mg PO BID #60 tab 11/29/16


 


Lisinopril [Zestril] 5 mg PO DAILY #90 tab 01/28/17


 


ARIPiprazole [Abilify] 30 mg PO DAILY #30 tab 01/30/17


 


QUEtiapine FUMARATE [SEROquel] 300 mg PO HS #30 tab 01/30/17


 


amLODIPine [Norvasc] 10 mg PO HS  tab 01/30/17


 


Sulfamethox-Tmp 800-160Mg [Bactrim 1 tab PO Q12HR #20 tab 02/03/17





-160 mg]  











 Allergies











Allergy/AdvReac Type Severity Reaction Status Date / Time


 


No Known Allergies Allergy   Verified 02/03/17 12:17














Review of Systems


ROS Statement: 


Those systems with pertinent positive or pertinent negative responses have been 

documented in the HPI.





ROS Other: All systems not noted in ROS Statement are negative.





Past Medical History


Past Medical History: Hypertension


History of Any Multi-Drug Resistant Organisms: None Reported


Past Surgical History: No Surgical Hx Reported


Past Psychological History: Anxiety, Depression


Smoking Status: Never smoker


Past Alcohol Use History: None Reported


Past Drug Use History: None Reported





General Exam





- General Exam Comments


Initial Comments: 





General:  The patient is awake and alert, in no distress, and does not appear 

acutely ill. 


Eye:  Pupils are equal, round and reactive to light, extra-ocular movements are 

intact.  No nystagmus.  There is normal conjunctiva bilaterally.  No signs of 

icterus.  


Ears, nose, mouth and throat:  There are moist mucous membranes and no oral 

lesions. 


Neck:  The neck is supple, there is no tenderness or JVD.  


Cardiovascular:  There is a regular rate and rhythm. No murmur, rub or gallop 

is appreciated.


Respiratory:  Lungs are clear to auscultation, respirations are non-labored, 

breath sounds are equal.  No wheezes, stridor, rales, or rhonchi.


Musculoskeletal:  Normal ROM, no tenderness.  Strength 5/5. Sensation intact. 

Pulses equal bilaterally 2+.  


Neurological:  A&O x 3. CN II-XII intact, There are no obvious motor or sensory 

deficits. Coordination appears grossly intact. Speech is normal.


Skin:  Skin is warm and dry and no rashes or lesions are noted. 


Psychiatric:  Cooperative, appropriate mood & affect, normal judgment.  


Limitations: no limitations





Course


 Vital Signs











  02/03/17





  11:29


 


Temperature 96 F L


 


Pulse Rate 118 H


 


Respiratory 20





Rate 


 


Blood Pressure 124/97


 


O2 Sat by Pulse 97





Oximetry 














Medical Decision Making





- Medical Decision Making





Patient reexamined at this time shows no signs of distress.  Mental health has 

seen the patient and discussed it with both on-call psychiatrist and her 

personal psychiatrist Dr. Riley.  They state that she can follow-up in the 

office with Dr. Riley on Monday.  She does have an appointment.  Patient's been 

updated of this.  Her labs been reviewed and does show mildly elevated BUN/

creatinine.  Patient drank lots of fluids here the emergency room.  Patient's 

urinalysis does show urinary tract infection.  Will be started on antibiotics.  

Advised follow-up family doctor for repeat urinalysis.  Patient states 

understanding of this time and is in agreement with this plan.





- Lab Data


Result diagrams: 


 02/03/17 12:14





 02/03/17 12:14


 Lab Results











  02/03/17 02/03/17 02/03/17 Range/Units





  12:14 12:14 12:41 


 


WBC  6.6    (3.8-10.6)  k/uL


 


RBC  3.97    (3.80-5.40)  m/uL


 


Hgb  11.7    (11.4-16.0)  gm/dL


 


Hct  34.9    (34.0-46.0)  %


 


MCV  87.9    (80.0-100.0)  fL


 


MCH  29.3    (25.0-35.0)  pg


 


MCHC  33.4    (31.0-37.0)  g/dL


 


RDW  15.1    (11.5-15.5)  %


 


Plt Count  262    (150-450)  k/uL


 


Neutrophils %  66    %


 


Lymphocytes %  21    %


 


Monocytes %  8    %


 


Eosinophils %  1    %


 


Basophils %  1    %


 


Neutrophils #  4.3    (1.3-7.7)  k/uL


 


Lymphocytes #  1.4    (1.0-4.8)  k/uL


 


Monocytes #  0.6    (0-1.0)  k/uL


 


Eosinophils #  0.1    (0-0.7)  k/uL


 


Basophils #  0.1    (0-0.2)  k/uL


 


Sodium   137   (137-145)  mmol/L


 


Potassium   3.4 L   (3.5-5.1)  mmol/L


 


Chloride   101   ()  mmol/L


 


Carbon Dioxide   24   (22-30)  mmol/L


 


Anion Gap   12   mmol/L


 


BUN   21 H   (7-17)  mg/dL


 


Creatinine   1.20 H   (0.52-1.04)  mg/dL


 


Est GFR (MDRD) Af Amer   54   (>60 ml/min/1.73 sqM)  


 


Est GFR (MDRD) Non-Af   44   (>60 ml/min/1.73 sqM)  


 


Glucose   138 H   (74-99)  mg/dL


 


Calcium   9.8   (8.4-10.2)  mg/dL


 


Total Bilirubin   1.1   (0.2-1.3)  mg/dL


 


AST   23   (14-36)  U/L


 


ALT   31   (9-52)  U/L


 


Alkaline Phosphatase   71   ()  U/L


 


Total Protein   7.1   (6.3-8.2)  g/dL


 


Albumin   4.2   (3.5-5.0)  g/dL


 


Urine Color    Yellow  


 


Urine Appearance    Turbid H  (Clear)  


 


Urine pH    5.5  (5.0-8.0)  


 


Ur Specific Gravity    1.019  (1.001-1.035)  


 


Urine Protein    1+ H  (Negative)  


 


Urine Glucose (UA)    Negative  (Negative)  


 


Urine Ketones    Negative  (Negative)  


 


Urine Blood    Negative  (Negative)  


 


Urine Nitrate    Negative  (Negative)  


 


Urine Bilirubin    Negative  (Negative)  


 


Urine Urobilinogen    <2.0  (<2.0)  mg/dL


 


Ur Leukocyte Esterase    Large H  (Negative)  


 


Urine WBC    >182 H  (0-5)  /hpf


 


Ur Squamous Epith Cells    14 H  (0-4)  /hpf


 


Urine Bacteria    Occasional H  (None)  /hpf


 


Urine Mucus    Many H  (None)  /hpf


 


Urine Opiates Screen    Not Detected  (NotDetected)  


 


Ur Oxycodone Screen    Not Detected  (NotDetected)  


 


Urine Methadone Screen    Not Detected  (NotDetected)  


 


Ur Propoxyphene Screen    Not Detected  (NotDetected)  


 


Ur Barbiturates Screen    Not Detected  (NotDetected)  


 


U Tricyclic Antidepress    Detected H  (NotDetected)  


 


Ur Phencyclidine Scrn    Not Detected  (NotDetected)  


 


Ur Amphetamines Screen    Not Detected  (NotDetected)  


 


U Methamphetamines Scrn    Not Detected  (NotDetected)  


 


U Benzodiazepines Scrn    Not Detected  (NotDetected)  


 


Urine Cocaine Screen    Not Detected  (NotDetected)  


 


U Marijuana (THC) Screen    Not Detected  (NotDetected)  


 


Serum Alcohol   <10   mg/dL














Disposition


Clinical Impression: 


 UTI (urinary tract infection), Paranoia





Disposition: HOME SELF-CARE


Condition: Good


Instructions:  Urinary Tract Infection in Women (ED)


Additional Instructions: 


Please follow-up with your psychiatrist Dr. Riley Monday with scheduled 

appointment as discussed.  Please use antibiotic as prescribed and follow-up 

the family doctor for repeat urinalysis.  Please return here to emergency room 

if any symptoms increase or worsen or for any other concerns.


Prescriptions: 


Sulfamethox-Tmp 800-160Mg [Bactrim -160 mg] 1 tab PO Q12HR #20 tab


Time of Disposition: 14:47

## 2018-09-20 ENCOUNTER — HOSPITAL ENCOUNTER (OUTPATIENT)
Dept: HOSPITAL 47 - LABWHC1 | Age: 73
Discharge: HOME | End: 2018-09-20
Payer: MEDICARE

## 2018-09-20 DIAGNOSIS — E55.9: ICD-10-CM

## 2018-09-20 DIAGNOSIS — D64.9: Primary | ICD-10-CM

## 2018-09-20 DIAGNOSIS — I10: ICD-10-CM

## 2018-09-20 DIAGNOSIS — E78.5: ICD-10-CM

## 2018-09-20 DIAGNOSIS — E03.9: ICD-10-CM

## 2018-09-20 LAB
ALBUMIN SERPL-MCNC: 4.5 G/DL (ref 3.5–5)
ALP SERPL-CCNC: 74 U/L (ref 38–126)
ALT SERPL-CCNC: 28 U/L (ref 9–52)
ANION GAP SERPL CALC-SCNC: 9 MMOL/L
AST SERPL-CCNC: 21 U/L (ref 14–36)
BASOPHILS # BLD AUTO: 0.1 K/UL (ref 0–0.2)
BASOPHILS NFR BLD AUTO: 1 %
BUN SERPL-SCNC: 19 MG/DL (ref 7–17)
CALCIUM SPEC-MCNC: 10 MG/DL (ref 8.4–10.2)
CHLORIDE SERPL-SCNC: 106 MMOL/L (ref 98–107)
CHOLEST SERPL-MCNC: 193 MG/DL (ref ?–200)
CK SERPL-CCNC: 50 U/L (ref 30–135)
CO2 SERPL-SCNC: 27 MMOL/L (ref 22–30)
EOSINOPHIL # BLD AUTO: 0.1 K/UL (ref 0–0.7)
EOSINOPHIL NFR BLD AUTO: 3 %
ERYTHROCYTE [DISTWIDTH] IN BLOOD BY AUTOMATED COUNT: 3.92 M/UL (ref 3.8–5.4)
ERYTHROCYTE [DISTWIDTH] IN BLOOD: 12.7 % (ref 11.5–15.5)
GLUCOSE SERPL-MCNC: 102 MG/DL (ref 74–99)
HCT VFR BLD AUTO: 37.3 % (ref 34–46)
HDLC SERPL-MCNC: 97 MG/DL (ref 40–60)
HGB BLD-MCNC: 12.4 GM/DL (ref 11.4–16)
LDLC SERPL CALC-MCNC: 85 MG/DL (ref 0–99)
LYMPHOCYTES # SPEC AUTO: 1.5 K/UL (ref 1–4.8)
LYMPHOCYTES NFR SPEC AUTO: 30 %
MAGNESIUM SPEC-SCNC: 2 MG/DL (ref 1.6–2.3)
MCH RBC QN AUTO: 31.7 PG (ref 25–35)
MCHC RBC AUTO-ENTMCNC: 33.3 G/DL (ref 31–37)
MCV RBC AUTO: 95.1 FL (ref 80–100)
MONOCYTES # BLD AUTO: 0.3 K/UL (ref 0–1)
MONOCYTES NFR BLD AUTO: 6 %
NEUTROPHILS # BLD AUTO: 2.9 K/UL (ref 1.3–7.7)
NEUTROPHILS NFR BLD AUTO: 57 %
PLATELET # BLD AUTO: 230 K/UL (ref 150–450)
POTASSIUM SERPL-SCNC: 4.3 MMOL/L (ref 3.5–5.1)
PROT SERPL-MCNC: 7.4 G/DL (ref 6.3–8.2)
SODIUM SERPL-SCNC: 142 MMOL/L (ref 137–145)
T4 FREE SERPL-MCNC: 0.68 NG/DL (ref 0.78–2.19)
TRIGL SERPL-MCNC: 57 MG/DL (ref ?–150)
WBC # BLD AUTO: 5 K/UL (ref 3.8–10.6)

## 2018-09-20 PROCEDURE — 83735 ASSAY OF MAGNESIUM: CPT

## 2018-09-20 PROCEDURE — 84443 ASSAY THYROID STIM HORMONE: CPT

## 2018-09-20 PROCEDURE — 86140 C-REACTIVE PROTEIN: CPT

## 2018-09-20 PROCEDURE — 82550 ASSAY OF CK (CPK): CPT

## 2018-09-20 PROCEDURE — 85652 RBC SED RATE AUTOMATED: CPT

## 2018-09-20 PROCEDURE — 84439 ASSAY OF FREE THYROXINE: CPT

## 2018-09-20 PROCEDURE — 85025 COMPLETE CBC W/AUTO DIFF WBC: CPT

## 2018-09-20 PROCEDURE — 82306 VITAMIN D 25 HYDROXY: CPT

## 2018-09-20 PROCEDURE — 80061 LIPID PANEL: CPT

## 2018-09-20 PROCEDURE — 80053 COMPREHEN METABOLIC PANEL: CPT

## 2018-09-20 PROCEDURE — 36415 COLL VENOUS BLD VENIPUNCTURE: CPT

## 2019-04-20 ENCOUNTER — HOSPITAL ENCOUNTER (INPATIENT)
Dept: HOSPITAL 47 - EC | Age: 74
LOS: 4 days | Discharge: NURSING, REHAB OR LONG-TERM CARE | DRG: 558 | End: 2019-04-24
Payer: MEDICARE

## 2019-04-20 VITALS — BODY MASS INDEX: 17.7 KG/M2

## 2019-04-20 DIAGNOSIS — M62.82: Primary | ICD-10-CM

## 2019-04-20 DIAGNOSIS — I82.411: ICD-10-CM

## 2019-04-20 DIAGNOSIS — F41.9: ICD-10-CM

## 2019-04-20 DIAGNOSIS — Z83.3: ICD-10-CM

## 2019-04-20 DIAGNOSIS — M40.209: ICD-10-CM

## 2019-04-20 DIAGNOSIS — K76.89: ICD-10-CM

## 2019-04-20 DIAGNOSIS — R63.4: ICD-10-CM

## 2019-04-20 DIAGNOSIS — R47.9: ICD-10-CM

## 2019-04-20 DIAGNOSIS — I11.9: ICD-10-CM

## 2019-04-20 DIAGNOSIS — G24.01: ICD-10-CM
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
## 2019-04-20 DIAGNOSIS — E86.0: ICD-10-CM

## 2019-04-20 DIAGNOSIS — D64.9: ICD-10-CM

## 2019-04-20 DIAGNOSIS — Z82.3: ICD-10-CM

## 2019-04-20 DIAGNOSIS — R26.2: ICD-10-CM

## 2019-04-20 DIAGNOSIS — Z82.49: ICD-10-CM

## 2019-04-20 DIAGNOSIS — F20.0: ICD-10-CM

## 2019-04-20 DIAGNOSIS — Z79.899: ICD-10-CM

## 2019-04-20 DIAGNOSIS — F32.9: ICD-10-CM

## 2019-04-20 DIAGNOSIS — M19.90: ICD-10-CM

## 2019-04-20 LAB
ALBUMIN SERPL-MCNC: 4.1 G/DL (ref 3.5–5)
ALP SERPL-CCNC: 59 U/L (ref 38–126)
ALT SERPL-CCNC: 111 U/L (ref 9–52)
ANION GAP SERPL CALC-SCNC: 6 MMOL/L
APTT BLD: 22.8 SEC (ref 22–30)
AST SERPL-CCNC: 293 U/L (ref 14–36)
BASOPHILS # BLD AUTO: 0 K/UL (ref 0–0.2)
BASOPHILS # BLD AUTO: 0 K/UL (ref 0–0.2)
BASOPHILS NFR BLD AUTO: 0 %
BASOPHILS NFR BLD AUTO: 1 %
BUN SERPL-SCNC: 19 MG/DL (ref 7–17)
CALCIUM SPEC-MCNC: 9.3 MG/DL (ref 8.4–10.2)
CHLORIDE SERPL-SCNC: 101 MMOL/L (ref 98–107)
CO2 SERPL-SCNC: 32 MMOL/L (ref 22–30)
EOSINOPHIL # BLD AUTO: 0.1 K/UL (ref 0–0.7)
EOSINOPHIL # BLD AUTO: 0.1 K/UL (ref 0–0.7)
EOSINOPHIL NFR BLD AUTO: 1 %
EOSINOPHIL NFR BLD AUTO: 1 %
ERYTHROCYTE [DISTWIDTH] IN BLOOD BY AUTOMATED COUNT: 3.67 M/UL (ref 3.8–5.4)
ERYTHROCYTE [DISTWIDTH] IN BLOOD BY AUTOMATED COUNT: 4.25 M/UL (ref 3.8–5.4)
ERYTHROCYTE [DISTWIDTH] IN BLOOD: 12.7 % (ref 11.5–15.5)
ERYTHROCYTE [DISTWIDTH] IN BLOOD: 13.3 % (ref 11.5–15.5)
GLUCOSE SERPL-MCNC: 102 MG/DL (ref 74–99)
HCT VFR BLD AUTO: 34.2 % (ref 34–46)
HCT VFR BLD AUTO: 39.4 % (ref 34–46)
HGB BLD-MCNC: 11.5 GM/DL (ref 11.4–16)
HGB BLD-MCNC: 13.5 GM/DL (ref 11.4–16)
HYALINE CASTS UR QL AUTO: 19 /LPF (ref 0–2)
INR PPP: 1 (ref ?–1.2)
KETONES UR QL STRIP.AUTO: (no result)
LYMPHOCYTES # SPEC AUTO: 1.5 K/UL (ref 1–4.8)
LYMPHOCYTES # SPEC AUTO: 1.6 K/UL (ref 1–4.8)
LYMPHOCYTES NFR SPEC AUTO: 19 %
LYMPHOCYTES NFR SPEC AUTO: 20 %
MAGNESIUM SPEC-SCNC: 1.7 MG/DL (ref 1.6–2.3)
MAGNESIUM SPEC-SCNC: 1.8 MG/DL (ref 1.6–2.3)
MCH RBC QN AUTO: 31.2 PG (ref 25–35)
MCH RBC QN AUTO: 31.8 PG (ref 25–35)
MCHC RBC AUTO-ENTMCNC: 33.5 G/DL (ref 31–37)
MCHC RBC AUTO-ENTMCNC: 34.3 G/DL (ref 31–37)
MCV RBC AUTO: 92.8 FL (ref 80–100)
MCV RBC AUTO: 93.2 FL (ref 80–100)
MONOCYTES # BLD AUTO: 0.5 K/UL (ref 0–1)
MONOCYTES # BLD AUTO: 0.7 K/UL (ref 0–1)
MONOCYTES NFR BLD AUTO: 6 %
MONOCYTES NFR BLD AUTO: 9 %
NEUTROPHILS # BLD AUTO: 5.1 K/UL (ref 1.3–7.7)
NEUTROPHILS # BLD AUTO: 5.9 K/UL (ref 1.3–7.7)
NEUTROPHILS NFR BLD AUTO: 67 %
NEUTROPHILS NFR BLD AUTO: 71 %
PH UR: 6 [PH] (ref 5–8)
PLATELET # BLD AUTO: 207 K/UL (ref 150–450)
PLATELET # BLD AUTO: 223 K/UL (ref 150–450)
POTASSIUM SERPL-SCNC: 3.6 MMOL/L (ref 3.5–5.1)
PROT SERPL-MCNC: 6.8 G/DL (ref 6.3–8.2)
PT BLD: 10.6 SEC (ref 9–12)
RBC UR QL: 5 /HPF (ref 0–5)
SODIUM SERPL-SCNC: 139 MMOL/L (ref 137–145)
SP GR UR: 1.02 (ref 1–1.03)
SQUAMOUS UR QL AUTO: <1 /HPF (ref 0–4)
TROPONIN I SERPL-MCNC: 0.03 NG/ML (ref 0–0.03)
URATE SERPL-MCNC: 4.4 MG/DL (ref 3.7–7.4)
UROBILINOGEN UR QL STRIP: <2 MG/DL (ref ?–2)
WBC # BLD AUTO: 7.7 K/UL (ref 3.8–10.6)
WBC # BLD AUTO: 8.2 K/UL (ref 3.8–10.6)

## 2019-04-20 PROCEDURE — 36415 COLL VENOUS BLD VENIPUNCTURE: CPT

## 2019-04-20 PROCEDURE — 84550 ASSAY OF BLOOD/URIC ACID: CPT

## 2019-04-20 PROCEDURE — 96360 HYDRATION IV INFUSION INIT: CPT

## 2019-04-20 PROCEDURE — 70450 CT HEAD/BRAIN W/O DYE: CPT

## 2019-04-20 PROCEDURE — 82607 VITAMIN B-12: CPT

## 2019-04-20 PROCEDURE — 84484 ASSAY OF TROPONIN QUANT: CPT

## 2019-04-20 PROCEDURE — 84100 ASSAY OF PHOSPHORUS: CPT

## 2019-04-20 PROCEDURE — 83540 ASSAY OF IRON: CPT

## 2019-04-20 PROCEDURE — 85610 PROTHROMBIN TIME: CPT

## 2019-04-20 PROCEDURE — 71046 X-RAY EXAM CHEST 2 VIEWS: CPT

## 2019-04-20 PROCEDURE — 83735 ASSAY OF MAGNESIUM: CPT

## 2019-04-20 PROCEDURE — 71275 CT ANGIOGRAPHY CHEST: CPT

## 2019-04-20 PROCEDURE — 85730 THROMBOPLASTIN TIME PARTIAL: CPT

## 2019-04-20 PROCEDURE — 80074 ACUTE HEPATITIS PANEL: CPT

## 2019-04-20 PROCEDURE — 80053 COMPREHEN METABOLIC PANEL: CPT

## 2019-04-20 PROCEDURE — 82550 ASSAY OF CK (CPK): CPT

## 2019-04-20 PROCEDURE — 96361 HYDRATE IV INFUSION ADD-ON: CPT

## 2019-04-20 PROCEDURE — 93970 EXTREMITY STUDY: CPT

## 2019-04-20 PROCEDURE — 80048 BASIC METABOLIC PNL TOTAL CA: CPT

## 2019-04-20 PROCEDURE — 85025 COMPLETE CBC W/AUTO DIFF WBC: CPT

## 2019-04-20 PROCEDURE — 83550 IRON BINDING TEST: CPT

## 2019-04-20 PROCEDURE — 99285 EMERGENCY DEPT VISIT HI MDM: CPT

## 2019-04-20 PROCEDURE — 81003 URINALYSIS AUTO W/O SCOPE: CPT

## 2019-04-20 PROCEDURE — 82553 CREATINE MB FRACTION: CPT

## 2019-04-20 PROCEDURE — 93005 ELECTROCARDIOGRAM TRACING: CPT

## 2019-04-20 PROCEDURE — 81001 URINALYSIS AUTO W/SCOPE: CPT

## 2019-04-20 RX ADMIN — CEFAZOLIN SCH MLS/HR: 330 INJECTION, POWDER, FOR SOLUTION INTRAMUSCULAR; INTRAVENOUS at 19:50

## 2019-04-20 RX ADMIN — CEFAZOLIN SCH: 330 INJECTION, POWDER, FOR SOLUTION INTRAMUSCULAR; INTRAVENOUS at 18:16

## 2019-04-20 RX ADMIN — BENZTROPINE MESYLATE SCH MG: 0.5 TABLET ORAL at 19:49

## 2019-04-20 RX ADMIN — HEPARIN SODIUM SCH MLS/HR: 10000 INJECTION, SOLUTION INTRAVENOUS at 17:16

## 2019-04-20 RX ADMIN — BENZTROPINE MESYLATE SCH MG: 0.5 TABLET ORAL at 17:31

## 2019-04-20 RX ADMIN — CEPHALEXIN SCH MG: 500 CAPSULE ORAL at 17:31

## 2019-04-20 RX ADMIN — CEPHALEXIN SCH MG: 500 CAPSULE ORAL at 19:49

## 2019-04-20 NOTE — P.NPCON
History of Present Illness





- Reason for Consult


Consult date: 04/20/19


acute renal failure





- Chief Complaint


Rhabdomyolysis





- History of Present Illness





This is a 73-year-old female who is brought in by EMS because of if fall on to 

the floor from her bed and she was immobilized for a few hours.  She was seen in

consultation because of an elevated CK of 9000 range.





Her creatinine is normal but her urine output is not very well documented





She is somewhat difficult to get a history from.





She denies taking any nonsteroidals, alcohol or drug abuse





She seems to have be having some tremors.





Her home medication include a lateral cephalexin Haldol and benztropine.





She denies any shortness of breath cough fever chills nausea vomiting headache 

diarrhea abdominal pain dysuria frequency or hematuria





Past Medical History


Past Medical History: Hypertension


History of Any Multi-Drug Resistant Organisms: None Reported


Past Surgical History: No Surgical Hx Reported


Past Psychological History: Anxiety, Depression


Smoking Status: Never smoker


Past Alcohol Use History: None Reported


Past Drug Use History: None Reported





Medications and Allergies


                                Home Medications











 Medication  Instructions  Recorded  Confirmed  Type


 


Enalapril [Vasotec] 20 mg PO DAILY 11/15/18 04/20/19 History


 


Benztropine Mesylate 0.5 mg PO TID 04/20/19 04/20/19 History


 


Cephalexin [Keflex] 500 mg PO TID 04/20/19 04/20/19 History


 


Haloperidol [Haldol] 5 mg PO DAILY 04/20/19 04/20/19 History








                                    Allergies











Allergy/AdvReac Type Severity Reaction Status Date / Time


 


No Known Allergies Allergy   Verified 04/20/19 10:31














Physical Exam


Vitals: 


                                   Vital Signs











  Temp Pulse Resp BP Pulse Ox


 


 04/20/19 10:00   61  14  118/67  100


 


 04/20/19 09:30   64  14  108/64  99


 


 04/20/19 09:00   70  12  108/73  99


 


 04/20/19 08:51  98.2 F  77  18  108/73  99








                                Intake and Output











 04/19/19 04/20/19 04/20/19





 22:59 06:59 14:59


 


Intake Total   600


 


Balance   600


 


Intake:   


 


  Intake, IV Titration   200





  Amount   


 


    Sodium Chloride 0.9% 1,   200





    000 ml @ 100 mls/hr IV .   





    [facility] Rx#:[id number]   


 


  Oral   400


 


Other:   


 


  Weight   44.633 kg








On exam she is awake alert oriented.  Poor memory unable to give any details of 

why she fell or how it happened but states that she slipped from the bed.  No 

history of losing consciousness.  She said she could not move.





HEENT exam no JVP lymphadenopathy neck is supple no facial asymmetry





Lungs are clear to auscultation good air entry bilaterally





Heart sounds are unremarkable for any murmur rub gallop





Abdomen soft nontender no organomegaly status masses





Extremity exam was trace edema





Neurologically awake alert oriented 3 but poor memory.  Moves all her 

extremities but profoundly weak.





Results





- Lab Results


                             Most recent lab results











Calcium  9.3 mg/dL (8.4-10.2)   04/20/19  08:45    


 


Magnesium  1.8 mg/dL (1.6-2.3)   04/20/19  08:45    














                                 04/20/19 08:45





                                 04/20/19 08:45





Assessment and Plan


Assessment: 





Impression





1.  Rhabdomyolysis, CK is 9862, after of fall from the bed onto the floor and 

immobilize for unclear reason.  UA shows trace protein, and 1+ blood.  There are

 5 RBCs.  Creatinine is 0.7 urine output is unclear no evidence currently of 

myoglobinuria renal failure.





2.  History of fall cause not very clear.





3.  DVT in the right side just now diagnosed on Doppler





4.  Mild liver dysfunction total bilirubin to 1.3 , and AST is 111.





5.  History of psych disorder on medication





Recommendation





1.  Maintain aggressive hydration with IV normal saline at 150 mL an hour.





2.  Check bladder scan and insert Parham if greater than 400 mL as IV need good 

urine output measurement as M vigorously hydrating her.





3.  Recheck CK in about 2 hours 8 hours from the previous months.





4.  Check calcium phosphorus uric acid magnesium.





5.  Will monitor closely intake and output and renal function as well as CK

## 2019-04-20 NOTE — US
EXAMINATION TYPE: US venous doppler duplex LE BI

 

DATE OF EXAM: 4/20/2019 2:42 PM

 

COMPARISON: NONE

 

CLINICAL HISTORY: 73-year-old female bilateral swelling .

 

SIDE PERFORMED: Bilateral  

 

TECHNIQUE:  The lower extremity deep venous system is examined utilizing real time linear array sonog
sujatha with graded compression, doppler sonography and color-flow sonography.

 

FINDINGS:

 

VESSELS IMAGED:

External Iliac Vein (EIV)

Common Femoral Vein

Deep Femoral Vein

Greater Saphenous Vein *

Femoral Vein

Popliteal Vein

Small Saphenous Vein *

Proximal Calf Veins

(* superficial vessels)

 

Sonographer notes:**Technical limitations - patient uncooperative - unable to rotate right leg into a
dequate position 

 

Right Leg:  +positive for DVT femoral vein extending into popliteal vein

 

Left Leg:  No evidence of DVT as visualized, patient unable to tolerate compression at lower femoral 
vein

 

 

 

IMPRESSION:

1. Technical limitations as above.

2. Right lower extremity positive for DVT within the femoral vein extending down into the popliteal v
ein.

3. No evidence for DVT within the left lower extremity imaged from the groin to the upper calf.

## 2019-04-20 NOTE — CT
EXAMINATION TYPE: CT brain wo con

 

DATE OF EXAM: 4/20/2019

 

COMPARISON: None

 

HISTORY: 73-year-old female with pain and altered mental status

 

TECHNIQUE:  Examination was done in axial plane without intravenous contrast.  Coronal and sagittal r
econstructions performed.

 

CT DLP: 1052.4 mGycm

Automated exposure control for dose reduction was used.

 

FINDINGS:

There is no evidence of  acute intracranial hemorrhage, acute ischemic changes, mass, mass-effect, or
 extra-axial fluid collection.  There is no effacement of cerebral sulci or basal subarachnoid cister
ns.  There is no hydrocephalus.  There is no midline shift.  Gray-white matter distinction is preserv
ed.

 

Mild cerebral cortical volume loss. Atherosclerotic calcifications in the carotid siphons.

 

Paranasal sinuses and mastoid air cells well pneumatized. Orbits and globes are intact.

 

 

IMPRESSION:

No acute intracranial abnormality seen.

## 2019-04-20 NOTE — HP
HISTORY AND PHYSICAL



DATE OF ADMISSION:

04/20/2019



This is a 73-year-old white female who is a patient of Dr. Nicole and she is being

admitted by me as I am covering Dr. George this weekend.



This is a 73-year-old white female who was brought to the emergency room by EMS and she

was found to be lying on the floor and apparently she fell from the bed and was not

able to get up and get back to the bed by herself.  In the ER, she denied any headache.

She had some pain in her extremities, but that also subsided while she was in the ER.

Her CT scan of the head did not show any acute intracranial abnormality.  Her chest x-

ray showed hyperinflation but there is no definite acute process.  EKG showed normal

sinus rhythm and there was no acute changes. CBC showed a WBC count of 8.2, hemoglobin

13.5, platelet count 223,000.  BUN 19, creatinine 0.70, and sodium 139, potassium 3.6,

glucose 102, and CK 9862.  Troponin 0.029.  Urinalysis negative.  The patient was found

to be dehydrated and extremely weak and she was admitted to the hospital for further

evaluation and treatment.



PAST MEDICAL HISTORY:

Her past medical history reveals that she is known to have hypertensive cardiovascular

disease and also has history of psychiatric problems and she has been seeing Dr. Riley.

She is on Haldol 5 mg p.o. daily. She is also on enalapril 20 mg daily, it is 6

benztropine mesylate 0.5 mg p.o. t.i.d.  She lives in an assisted living place.



ALLERGIES:

She has no known drug allergies.



She does not smoke and she does not drink alcohol.



FAMILY HISTORY:

Positive for heart disease, stroke, diabetes and high blood pressure.



REVIEW OF SYSTEM:

The patient denies any headache and she has no fever or chills and has tremor of the

extremities and also has some impairment of the speech possibly because of the tremor.

I have discussed this with the patient's brother.  He told that the patient has

complications of Haldol, which is causing these tremors and speech impairment, and this

is not a new problem.  Her appetite has been good.  Bowels regular.  She denies any

chest pain.  She has no cough.  She has no abdominal pain.  She has no polyuria or

dysuria.  She has no other neurological symptoms.



PHYSICAL EXAMINATION:

Physical examination reveals a 73-year-old white female, extremely weak, dehydrated,

and she has tremor of the extremities and also some impairment of the speech, possibly

due to the tremor and there is no jaundice.  There is no generalized lymphadenopathy.

There are no petechia or bruises.

Temperature 98.2, pulse of 78 per minute, respirations 18 per minute, blood pressure

108/73, oxygen saturation 99.

The patient is alert and oriented.  She is extremely weak and dehydrated.  EXAMINATION

OF THE ENT: Negative.

Neck is supple.  There is no jugular venous distention.  There is no goiter and there

is no carotid bruit.

Heart is in sinus rhythm.

Lungs are clear to auscultation and percussion.

Abdomen is soft and nontender.  There is no mass palpable.

Examination of the lower extremities reveals bilateral pitting edema.

Neurologic examination does not reveal any localizing signs.



IMPRESSION:

1. Rhabdomyolysis.

2. Dehydration.

3. History of fall.

4. Hypertensive cardiovascular disease.

5. Psychiatric problem, possibly anxiety and nervousness.

6. Tremor with slight impairment of her speech.

7. Edema of the lower extremities.



PLAN:

Patient will be admitted to the hospital. Give IV fluids to correct the dehydration and

will get Nephrology consultation because of rhabdomyolysis. We will also get a venous

Doppler to rule out DVT. Will consult the nephrologist and also get psychiatry

consultation.





Prognosis is guarded.  The diagnosis, prognosis and therapeutic plans were discussed in

detail with the patient and I also discussed with her brother. Her brother also told

her tremor and her slight speech impairment are due to some complication of the Haldol

and she is following with psychiatrist, Dr. Riley for psychiatric problems.





MMODL / IJN: [id number] / Job#: [id number]

## 2019-04-20 NOTE — XR
EXAMINATION TYPE: XR chest 2V

 

DATE OF EXAM: 4/20/2019

 

COMPARISON: 11/15/2018

 

HISTORY: 73-year-old female with cough and fall

 

TECHNIQUE:  AP and lateral views

 

FINDINGS:  

Heart normal size. Mild elongation thoracic aorta. Mild hyperinflation may relate to depth of inspira
tion. Some strandy atelectasis right lower lung. No alee consolidation or pleural effusion.

 

 

IMPRESSION:  

Hyperinflation may relate to depth of inspiration or underlying emphysema. No definite acute process.

## 2019-04-20 NOTE — ED
Fall HPI





- General


Stated Complaint: fall


Time Seen by Provider: 04/20/19 08:28


Source: patient, EMS, RN notes reviewed





- History of Present Illness


Initial Comments: 





This is a 73-year-old female who is brought in by EMS for evaluation after she 

was found on the floor where she laid on the right.  She stated that she could 

not get herself up into her bed and slid off the bed onto the floor she lay on 

her left side most of the evening.  She initially complain some left shoulder 

pain has none now at this time she denies any head neck or back pain.  No fevers

chills nausea vomiting sweats no other symptoms no focal deficits.  She was slow

to respond to questioning per paramedics.


MD Complaint: fall, other





- Related Data


                                Home Medications











 Medication  Instructions  Recorded  Confirmed


 


Enalapril [Vasotec] 20 mg PO DAILY 11/15/18 04/20/19


 


Benztropine Mesylate 0.5 mg PO TID 04/20/19 04/20/19


 


Cephalexin [Keflex] 500 mg PO TID 04/20/19 04/20/19


 


Haloperidol [Haldol] 5 mg PO DAILY 04/20/19 04/20/19











                                    Allergies











Allergy/AdvReac Type Severity Reaction Status Date / Time


 


No Known Allergies Allergy   Verified 04/20/19 10:31














Review of Systems


ROS Statement: 


Those systems with pertinent positive or pertinent negative responses have been 

documented in the HPI.





ROS Other: All systems not noted in ROS Statement are negative.





Past Medical History


Past Medical History: Hypertension


History of Any Multi-Drug Resistant Organisms: None Reported


Past Surgical History: No Surgical Hx Reported


Past Psychological History: Anxiety, Depression


Smoking Status: Never smoker


Past Alcohol Use History: None Reported


Past Drug Use History: None Reported





General Exam





- General Exam Comments


Initial Comments: 





This is a well-developed sec appearing female who is awake alert oriented 3 but

somewhat lethargic she does demonstrate a [place] Coma Scale of 15 at this time


General appearance: alert, in no apparent distress


Head exam: Present: atraumatic, normocephalic, normal inspection


Eye exam: Present: normal appearance, PERRL, EOMI.  Absent: scleral icterus, 

conjunctival injection, periorbital swelling


ENT exam: Present: mucous membranes dry


Neck exam: Present: normal inspection, full ROM, other (No stridor JVD or 

bruits).  Absent: tenderness, meningismus, lymphadenopathy


Respiratory exam: Present: normal lung sounds bilaterally.  Absent: respiratory 

distress, wheezes, rales, rhonchi, stridor


Cardiovascular Exam: Present: regular rate, normal rhythm, normal heart sounds. 

Absent: systolic murmur, diastolic murmur, rubs, gallop, clicks


GI/Abdominal exam: Present: soft, normal bowel sounds.  Absent: distended, 

tenderness, guarding, rebound, rigid


Extremities exam: Present: full ROM, normal capillary refill, other (Small area 

of ecchymosis noted over the posterior left shoulder no step-off no 

crepitation).  Absent: tenderness, pedal edema, joint swelling, calf tenderness


Back exam: Present: normal inspection


Neurological exam: Present: alert, oriented X3, CN II-XII intact


Psychiatric exam: Present: normal affect, normal mood


Skin exam: Present: warm, dry, intact, normal color.  Absent: rash





Course


                                   Vital Signs











  04/20/19 04/20/19 04/20/19





  08:51 09:00 09:30


 


Temperature 98.2 F  


 


Pulse Rate 77 70 64


 


Respiratory 18 12 14





Rate   


 


Blood Pressure 108/73 108/73 108/64


 


O2 Sat by Pulse 99 99 99





Oximetry   














  04/20/19





  10:00


 


Temperature 


 


Pulse Rate 61


 


Respiratory 14





Rate 


 


Blood Pressure 118/67


 


O2 Sat by Pulse 100





Oximetry 














Medical Decision Making





- Medical Decision Making





The patient and family who is currently not present I did discuss the case with 

Dr. Pope who is covering for Dr. George.  Patient be admitted for IV fluids 

renal consultation due to the rhabdomyolysis.





- Lab Data


Result diagrams: 


                                 04/20/19 08:45





                                 04/20/19 08:45


                                   Lab Results











  04/20/19 04/20/19 04/20/19 Range/Units





  08:45 08:45 08:45 


 


WBC   8.2   (3.8-10.6)  k/uL


 


RBC   4.25   (3.80-5.40)  m/uL


 


Hgb   13.5   (11.4-16.0)  gm/dL


 


Hct   39.4   (34.0-46.0)  %


 


MCV   92.8   (80.0-100.0)  fL


 


MCH   31.8   (25.0-35.0)  pg


 


MCHC   34.3   (31.0-37.0)  g/dL


 


RDW   12.7   (11.5-15.5)  %


 


Plt Count   223   (150-450)  k/uL


 


Neutrophils %   71   %


 


Lymphocytes %   19   %


 


Monocytes %   6   %


 


Eosinophils %   1   %


 


Basophils %   0   %


 


Neutrophils #   5.9   (1.3-7.7)  k/uL


 


Lymphocytes #   1.5   (1.0-4.8)  k/uL


 


Monocytes #   0.5   (0-1.0)  k/uL


 


Eosinophils #   0.1   (0-0.7)  k/uL


 


Basophils #   0.0   (0-0.2)  k/uL


 


Sodium    139  (137-145)  mmol/L


 


Potassium    3.6  (3.5-5.1)  mmol/L


 


Chloride    101  ()  mmol/L


 


Carbon Dioxide    32 H  (22-30)  mmol/L


 


Anion Gap    6  mmol/L


 


BUN    19 H  (7-17)  mg/dL


 


Creatinine    0.70  (0.52-1.04)  mg/dL


 


Est GFR (CKD-EPI)AfAm    >90  (>60 ml/min/1.73 sqM)  


 


Est GFR (CKD-EPI)NonAf    86  (>60 ml/min/1.73 sqM)  


 


Glucose    102 H  (74-99)  mg/dL


 


Calcium    9.3  (8.4-10.2)  mg/dL


 


Magnesium    1.8  (1.6-2.3)  mg/dL


 


Total Bilirubin    1.3  (0.2-1.3)  mg/dL


 


AST    293 H  (14-36)  U/L


 


ALT    111 H  (9-52)  U/L


 


Alkaline Phosphatase    59  ()  U/L


 


Total Creatine Kinase  9862 H*    ()  U/L


 


CK-MB (CK-2)  68.3 H    (0.0-2.4)  ng/mL


 


CK-MB (CK-2) Rel Index      


 


Troponin I  0.029    (0.000-0.034)  ng/mL


 


Total Protein    6.8  (6.3-8.2)  g/dL


 


Albumin    4.1  (3.5-5.0)  g/dL


 


Urine Color     


 


Urine Appearance     (Clear)  


 


Urine pH     (5.0-8.0)  


 


Ur Specific Gravity     (1.001-1.035)  


 


Urine Protein     (Negative)  


 


Urine Glucose (UA)     (Negative)  


 


Urine Ketones     (Negative)  


 


Urine Blood     (Negative)  


 


Urine Nitrite     (Negative)  


 


Urine Bilirubin     (Negative)  


 


Urine Urobilinogen     (<2.0)  mg/dL


 


Ur Leukocyte Esterase     (Negative)  


 


Urine RBC     (0-5)  /hpf


 


Urine WBC     (0-5)  /hpf


 


Ur Squamous Epith Cells     (0-4)  /hpf


 


Hyaline Casts     (0-2)  /lpf


 


Urine Mucus     (None)  /hpf














  04/20/19 Range/Units





  10:15 


 


WBC   (3.8-10.6)  k/uL


 


RBC   (3.80-5.40)  m/uL


 


Hgb   (11.4-16.0)  gm/dL


 


Hct   (34.0-46.0)  %


 


MCV   (80.0-100.0)  fL


 


MCH   (25.0-35.0)  pg


 


MCHC   (31.0-37.0)  g/dL


 


RDW   (11.5-15.5)  %


 


Plt Count   (150-450)  k/uL


 


Neutrophils %   %


 


Lymphocytes %   %


 


Monocytes %   %


 


Eosinophils %   %


 


Basophils %   %


 


Neutrophils #   (1.3-7.7)  k/uL


 


Lymphocytes #   (1.0-4.8)  k/uL


 


Monocytes #   (0-1.0)  k/uL


 


Eosinophils #   (0-0.7)  k/uL


 


Basophils #   (0-0.2)  k/uL


 


Sodium   (137-145)  mmol/L


 


Potassium   (3.5-5.1)  mmol/L


 


Chloride   ()  mmol/L


 


Carbon Dioxide   (22-30)  mmol/L


 


Anion Gap   mmol/L


 


BUN   (7-17)  mg/dL


 


Creatinine   (0.52-1.04)  mg/dL


 


Est GFR (CKD-EPI)AfAm   (>60 ml/min/1.73 sqM)  


 


Est GFR (CKD-EPI)NonAf   (>60 ml/min/1.73 sqM)  


 


Glucose   (74-99)  mg/dL


 


Calcium   (8.4-10.2)  mg/dL


 


Magnesium   (1.6-2.3)  mg/dL


 


Total Bilirubin   (0.2-1.3)  mg/dL


 


AST   (14-36)  U/L


 


ALT   (9-52)  U/L


 


Alkaline Phosphatase   ()  U/L


 


Total Creatine Kinase   ()  U/L


 


CK-MB (CK-2)   (0.0-2.4)  ng/mL


 


CK-MB (CK-2) Rel Index   


 


Troponin I   (0.000-0.034)  ng/mL


 


Total Protein   (6.3-8.2)  g/dL


 


Albumin   (3.5-5.0)  g/dL


 


Urine Color  Yellow  


 


Urine Appearance  Cloudy H  (Clear)  


 


Urine pH  6.0  (5.0-8.0)  


 


Ur Specific Gravity  1.017  (1.001-1.035)  


 


Urine Protein  Trace H  (Negative)  


 


Urine Glucose (UA)  Negative  (Negative)  


 


Urine Ketones  1+ H  (Negative)  


 


Urine Blood  Small H  (Negative)  


 


Urine Nitrite  Negative  (Negative)  


 


Urine Bilirubin  Negative  (Negative)  


 


Urine Urobilinogen  <2.0  (<2.0)  mg/dL


 


Ur Leukocyte Esterase  Negative  (Negative)  


 


Urine RBC  5  (0-5)  /hpf


 


Urine WBC  5  (0-5)  /hpf


 


Ur Squamous Epith Cells  <1  (0-4)  /hpf


 


Hyaline Casts  19 H  (0-2)  /lpf


 


Urine Mucus  Moderate H  (None)  /hpf














- EKG Data


-: EKG Interpreted by Me


EKG shows normal: sinus rhythm (Sinus rhythm rate is 75.  Interval 148 QRS 

duration 72 QT since QTC of 14/466 old anterior changes no acute ST-T wave 

changes this is correlated with the EKG submitted by paramedics.)





- Radiology Data


Radiology results: report reviewed (I did review the imaging and report no acute

findings.), image reviewed





Disposition


Clinical Impression: 


 Rhabdomyolysis, Dehydration, Fall





Disposition: ADMITTED AS IP TO THIS HOSP


Condition: Fair


Referrals: 


Waqas George MD [Primary Care Provider] - 1-2 days

## 2019-04-21 LAB
ANION GAP SERPL CALC-SCNC: 1 MMOL/L
BASOPHILS # BLD AUTO: 0.1 K/UL (ref 0–0.2)
BASOPHILS NFR BLD AUTO: 1 %
BUN SERPL-SCNC: 21 MG/DL (ref 7–17)
CALCIUM SPEC-MCNC: 8.1 MG/DL (ref 8.4–10.2)
CHLORIDE SERPL-SCNC: 111 MMOL/L (ref 98–107)
CO2 SERPL-SCNC: 27 MMOL/L (ref 22–30)
EOSINOPHIL # BLD AUTO: 0.1 K/UL (ref 0–0.7)
EOSINOPHIL NFR BLD AUTO: 2 %
ERYTHROCYTE [DISTWIDTH] IN BLOOD BY AUTOMATED COUNT: 3.29 M/UL (ref 3.8–5.4)
ERYTHROCYTE [DISTWIDTH] IN BLOOD: 13.8 % (ref 11.5–15.5)
GLUCOSE SERPL-MCNC: 113 MG/DL (ref 74–99)
HCT VFR BLD AUTO: 30.6 % (ref 34–46)
HGB BLD-MCNC: 10.3 GM/DL (ref 11.4–16)
LYMPHOCYTES # SPEC AUTO: 1.6 K/UL (ref 1–4.8)
LYMPHOCYTES NFR SPEC AUTO: 24 %
MCH RBC QN AUTO: 31.4 PG (ref 25–35)
MCHC RBC AUTO-ENTMCNC: 33.7 G/DL (ref 31–37)
MCV RBC AUTO: 93 FL (ref 80–100)
MONOCYTES # BLD AUTO: 0.5 K/UL (ref 0–1)
MONOCYTES NFR BLD AUTO: 8 %
NEUTROPHILS # BLD AUTO: 4.3 K/UL (ref 1.3–7.7)
NEUTROPHILS NFR BLD AUTO: 64 %
PLATELET # BLD AUTO: 176 K/UL (ref 150–450)
POTASSIUM SERPL-SCNC: 3.7 MMOL/L (ref 3.5–5.1)
SODIUM SERPL-SCNC: 139 MMOL/L (ref 137–145)
WBC # BLD AUTO: 6.7 K/UL (ref 3.8–10.6)

## 2019-04-21 RX ADMIN — CEFAZOLIN SCH: 330 INJECTION, POWDER, FOR SOLUTION INTRAMUSCULAR; INTRAVENOUS at 20:03

## 2019-04-21 RX ADMIN — CEFAZOLIN SCH: 330 INJECTION, POWDER, FOR SOLUTION INTRAMUSCULAR; INTRAVENOUS at 14:46

## 2019-04-21 RX ADMIN — CEPHALEXIN SCH MG: 500 CAPSULE ORAL at 21:14

## 2019-04-21 RX ADMIN — HEPARIN SODIUM PRN UNIT: 5000 INJECTION, SOLUTION INTRAVENOUS; SUBCUTANEOUS at 00:57

## 2019-04-21 RX ADMIN — CEFAZOLIN SCH: 330 INJECTION, POWDER, FOR SOLUTION INTRAMUSCULAR; INTRAVENOUS at 05:58

## 2019-04-21 RX ADMIN — LISINOPRIL SCH MG: 20 TABLET ORAL at 10:09

## 2019-04-21 RX ADMIN — BENZTROPINE MESYLATE SCH MG: 0.5 TABLET ORAL at 21:14

## 2019-04-21 RX ADMIN — BENZTROPINE MESYLATE SCH MG: 0.5 TABLET ORAL at 10:09

## 2019-04-21 RX ADMIN — HEPARIN SODIUM SCH MLS/HR: 10000 INJECTION, SOLUTION INTRAVENOUS at 19:05

## 2019-04-21 RX ADMIN — HALOPERIDOL SCH MG: 5 TABLET ORAL at 10:09

## 2019-04-21 RX ADMIN — CEPHALEXIN SCH MG: 500 CAPSULE ORAL at 17:51

## 2019-04-21 RX ADMIN — BENZTROPINE MESYLATE SCH MG: 0.5 TABLET ORAL at 17:51

## 2019-04-21 RX ADMIN — CEPHALEXIN SCH MG: 500 CAPSULE ORAL at 10:09

## 2019-04-21 NOTE — PN
PROGRESS NOTE



DATE OF SERVICE:

04/21/2019



This is a 73-year-old white female who was found lying on the floor and was brought to

the emergency room by the EMS and the patient apparently fell from the bed and was not

able to get up by herself.  In the ER, her CK was markedly elevated and she was still

extremely weak and lethargic and she was admitted to the hospital with the diagnosis of

rhabdomyolysis and dehydration.  The patient was placed back on her previous home

medications.  She has been on Haldol by her psychiatrist, Dr. Riley, but the patient

has had some side effects of tremor and some speech impairment and so a psychiatric

consultation has been requested.  She had a edema of the both lower extremities and a

venous Doppler has been done and this showed that the right leg was positive for DVT of

the femoral vein extending into the popliteal vein.  There was no DVT in the left leg.

The patient has been is started on heparin and we will also get a CT angio to rule out

pulmonary embolism.  The patient has also been seen by nephrologist in consultation

with regards to the rhabdomyolysis.  Her PCP is Dr. George and he will be back tomorrow

and he will assume care of the patient starting tomorrow.





MMODL / IJN: [id number] / Job#: [id number]

## 2019-04-21 NOTE — P.PN
Subjective


Progress Note Date: 04/21/19


Principal diagnosis: 


This 73-year-old female is seen because of rhabdomyolysis without any 

myoglobinuria kidney injury.  Her CK was in the 90,000 range but has come down 

to the 7000 range.  Urine analysis shows small blood and 5 RBCs.  Creatinine is 

normal urine output 


- 887 and 80 mL for the last 24 hour shifts.





Patient is unable to give any good history





 History of Present Illness





This is a 73-year-old female who is brought in by EMS because of if fall on to 

the floor from her bed and she was immobilized for a few hours.  She was seen in

consultation because of an elevated CK of 9000 range.





Her creatinine is normal but her urine output is not very well documented





She is somewhat difficult to get a history from.





She denies taking any nonsteroidals, alcohol or drug abuse





She seems to have be having some tremors.





Her home medication include a lateral cephalexin Haldol and benztropine.





She denies any shortness of breath cough fever chills nausea vomiting headache 

diarrhea abdominal pain dysuria frequency or hematuria





Objective





- Vital Signs


Vital signs: 


                                   Vital Signs











Temp  98.0 F   04/21/19 12:36


 


Pulse  70   04/21/19 12:36


 


Resp  18   04/21/19 12:36


 


BP  108/61   04/21/19 12:36


 


Pulse Ox  93 L  04/21/19 12:36








                                 Intake & Output











 04/20/19 04/21/19 04/21/19





 18:59 06:59 18:59


 


Intake Total 600 63.067 89.189


 


Output Total 380 400 


 


Balance 220 -336.933 89.189


 


Weight 44.633 kg  49.895 kg


 


Intake:   


 


  Intake, IV Titration 200 63.067 89.189





  Amount   


 


    Heparin Sod,Pork in 0.45%  63.067 89.189





    NaCl 25,000 unit In 0.45   





    % NaCl 1 250ml.bag @ 18   





    UNITS/KG/HR 8.034 mls/hr   





    IV .Q24H JARET Rx#:   





    [id number]   


 


    Sodium Chloride 0.9% 1, 200  





    000 ml @ 100 mls/hr IV .   





    Q10H JARET Rx#:[id number]   


 


  Oral 400  


 


Output:   


 


  Urine 350 400 


 


  Post Void Residual 30  


 


Other:   


 


  Voiding Method Bedpan Bedpan 


 


  # Voids  0 








On exam she is awake alert oriented.  Poor memory unable to give any details of 

why she fell or how it happened but states that she slipped from the bed.  No 

history of losing consciousness.  She said she could not move.





HEENT exam no JVP lymphadenopathy neck is supple no facial asymmetry





Lungs are clear to auscultation good air entry bilaterally





Heart sounds are unremarkable for any murmur rub gallop





Abdomen soft nontender no organomegaly status masses





Extremity exam was trace edema





Neurologically awake alert oriented 3 but poor memory.  Moves all her 

extremities but profoundly weak.





- Labs


CBC & Chem 7: 


                                 04/21/19 07:59





                                 04/21/19 07:59


Labs: 


                  Abnormal Lab Results - Last 24 Hours (Table)











  04/20/19 04/20/19 04/20/19 Range/Units





  15:31 15:31 23:17 


 


RBC   3.67 L   (3.80-5.40)  m/uL


 


Hgb     (11.4-16.0)  gm/dL


 


Hct     (34.0-46.0)  %


 


APTT    32.7 H  (22.0-30.0)  sec


 


Chloride     ()  mmol/L


 


BUN     (7-17)  mg/dL


 


Glucose     (74-99)  mg/dL


 


Calcium     (8.4-10.2)  mg/dL


 


Creatine Kinase  7724 H*    ()  U/L














  04/21/19 04/21/19 04/21/19 Range/Units





  07:59 07:59 07:59 


 


RBC  3.29 L    (3.80-5.40)  m/uL


 


Hgb  10.3 L    (11.4-16.0)  gm/dL


 


Hct  30.6 L    (34.0-46.0)  %


 


APTT   74.5 H   (22.0-30.0)  sec


 


Chloride    111 H  ()  mmol/L


 


BUN    21 H  (7-17)  mg/dL


 


Glucose    113 H  (74-99)  mg/dL


 


Calcium    8.1 L  (8.4-10.2)  mg/dL


 


Creatine Kinase     ()  U/L














Assessment and Plan


Assessment: 





Impression





1.  Rhabdomyolysis, CK is 9862, after a fall from the bed onto the floor and 

immobilize for unclear reason.  UA shows trace protein, and 1+ blood.  There are

5 RBCs.  Creatinine is 0.7 urine output is unclear no evidence currently of 

myoglobinuria renal failure.  Total CK decreased to 7724 as of yesterday none 

available today he did creatinine is 0.67 and normal electrolytes.





2.  History of fall cause not very clear.





3.  DVT in the right side just now diagnosed on Doppler





4.  Mild liver dysfunction total bilirubin to 1.3 , and AST is 111.





5.  History of psych disorder on medication





Recommendation





1.  Maintain aggressive hydration with IV normal saline at 150 mL an hour.





2.  Recheck CK today





4.  Will monitor closely intake and output and renal function as well as CK

## 2019-04-21 NOTE — CT
EXAMINATION TYPE: CT chest angio for PE

 

DATE OF EXAM: 4/21/2019

 

COMPARISON: None

 

HISTORY: 73 year-old female shortness of breath, PE

 

TECHNIQUE: Contiguous axial scanning of the chest performed with IV Contrast, patient injected with 1
00 mL of Isovue 370. Coronal/sagittal MIP reconstructions performed.

 

CT DLP: 312 mGycm

Automated exposure control for dose reduction was used.

 

FINDINGS: 

Heart normal size without pericardial effusion. No flattening of the interventricular septum reflux o
f contrast into the hepatic veins. Mild coronary vessel calcifications.

 

Ascending aorta is ectatic at 3.8 cm. Conventional arch vessel branching anatomy.

 

Suggestion of mild diffuse anasarca-type change.

 

Satisfactory opacification of the pulmonary arterial system. No evidence for pulmonary embolus.

 

No thoracic lymphadenopathy by CT size criteria.

 

Multisequence bronchial wall thickening, diffuse septal lines, and trace bilateral pleural effusions 
with adjacent dependent atelectasis.

 

Small hiatal hernia. Visualized upper abdomen otherwise shows no gross abnormality.

 

Bones: Endplate spondylosis mid to lower thoracic spine.

 

 

IMPRESSION: 

 

1. NO EVIDENCE FOR PULMONARY EMBOLUS.

2. TRACE PLEURAL EFFUSIONS, MILD DIFFUSE ANASARCA-TYPE CHANGE, AND DIFFUSE SEPTAL LINES. CORRELATE FO
R MILD CHF OR FLUID OVERLOAD STATE WITH PULMONARY VASCULAR CONGESTION.

3. SMALL HIATAL HERNIA.

## 2019-04-22 LAB
BASOPHILS # BLD AUTO: 0.1 K/UL (ref 0–0.2)
BASOPHILS NFR BLD AUTO: 1 %
EOSINOPHIL # BLD AUTO: 0.1 K/UL (ref 0–0.7)
EOSINOPHIL NFR BLD AUTO: 2 %
ERYTHROCYTE [DISTWIDTH] IN BLOOD BY AUTOMATED COUNT: 3.45 M/UL (ref 3.8–5.4)
ERYTHROCYTE [DISTWIDTH] IN BLOOD: 13.3 % (ref 11.5–15.5)
HCT VFR BLD AUTO: 33.2 % (ref 34–46)
HGB BLD-MCNC: 10.9 GM/DL (ref 11.4–16)
LYMPHOCYTES # SPEC AUTO: 1.2 K/UL (ref 1–4.8)
LYMPHOCYTES NFR SPEC AUTO: 20 %
MCH RBC QN AUTO: 31.6 PG (ref 25–35)
MCHC RBC AUTO-ENTMCNC: 32.8 G/DL (ref 31–37)
MCV RBC AUTO: 96.3 FL (ref 80–100)
MONOCYTES # BLD AUTO: 0.4 K/UL (ref 0–1)
MONOCYTES NFR BLD AUTO: 6 %
NEUTROPHILS # BLD AUTO: 4.2 K/UL (ref 1.3–7.7)
NEUTROPHILS NFR BLD AUTO: 69 %
PLATELET # BLD AUTO: 185 K/UL (ref 150–450)
WBC # BLD AUTO: 6.1 K/UL (ref 3.8–10.6)

## 2019-04-22 RX ADMIN — HALOPERIDOL SCH MG: 5 TABLET ORAL at 08:44

## 2019-04-22 RX ADMIN — BENZTROPINE MESYLATE SCH MG: 0.5 TABLET ORAL at 08:43

## 2019-04-22 RX ADMIN — HEPARIN SODIUM PRN UNIT: 5000 INJECTION, SOLUTION INTRAVENOUS; SUBCUTANEOUS at 08:59

## 2019-04-22 RX ADMIN — CEFAZOLIN SCH MLS/HR: 330 INJECTION, POWDER, FOR SOLUTION INTRAMUSCULAR; INTRAVENOUS at 08:43

## 2019-04-22 RX ADMIN — LISINOPRIL SCH MG: 20 TABLET ORAL at 08:44

## 2019-04-22 RX ADMIN — BENZTROPINE MESYLATE SCH MG: 0.5 TABLET ORAL at 21:19

## 2019-04-22 RX ADMIN — HEPARIN SODIUM SCH MLS/HR: 10000 INJECTION, SOLUTION INTRAVENOUS at 21:19

## 2019-04-22 RX ADMIN — CEPHALEXIN SCH: 500 CAPSULE ORAL at 18:19

## 2019-04-22 RX ADMIN — BENZTROPINE MESYLATE SCH MG: 0.5 TABLET ORAL at 18:18

## 2019-04-22 RX ADMIN — CEPHALEXIN SCH MG: 500 CAPSULE ORAL at 08:43

## 2019-04-22 NOTE — CONS
CONSULTATION



DATE OF SERVICE:

04/21/2019.



PURPOSE FOR CONSULTATION:

Evaluate for antipsychotic medications.



HISTORY OF PRESENTING ILLNESS:

It is noted that I did not see the patient today, though I reviewed the chart and

discussed the case with nursing.  The patient was admitted to the medical floor after

being found at home.  She had suffered a fall on the night before and apparently laid

on her floor all night.  She was brought to the hospital by EMS.  She is noted to have

an elevated CK and has a diagnosis of rhabdomyolysis and dehydration.  According to

nursing, the concern relating to the psychiatric consultation was that the patient had

a noticeable tremor in her head and jaw all and there was a question raised as to

whether she could be taken off Haldol.  The progress note of Dr. Montaño indicates

that the patient sees Dr. Riley on an outpatient basis.  Currently, the patient is

receiving Haldol 5 mg a day and Cogentin 0.5 mg 3 times a day as her only psychotropic

medications.  I discussed with nursing that given that the patient is followed on

outpatient basis by Dr. Riley and that Dr. Riley will be in the morning,  the patient

would be best served to have Dr. Riley review her medications in regards to questions

of movement disorder as well as issues related to possible changes in medication.  I

will forward this note to Dr. Riley.





MMNANDOL / IJN: [id number] / Job#: [id number]

## 2019-04-22 NOTE — PN
PROGRESS NOTE



ADDENDUM:



DATE OF SERVICE:

04/22/2019



Patient I discussed with her that she is going to be on the heparin at this time and

then will switch is to subsequently Eliquis depend on her current problem if the

insurance will pay for it.  Meanwhile, the patient I did talk to Mr. Persaud her

brother, who is the caregiver in detail on the phone and explained to him that the

patient's condition and loss of weight, currently with the DVT of the right leg and

ability to eat.  Encouragement to us to see if we need to the patient had to be

institution as to be in [facility] or other institution.  Mr. Persaud, the brother

and caregiver, he stated that he is agreeable for [facility] and he will be also

talking to the  and discharge planning and will be put in the order for

that.  After significant discussion found that this is the best for the patient as well

as Dr. Onofre, the psychiatrist stated the same, that the patient needs to be in a

skilled facility for further care and watching her and feeding her and to get her in

the home, as well as for rehabilitation as she become now bending over with the

kyphosis and needs significant rehab.





MMNANDOL / IJN: [id number] / Job#: [id number]

## 2019-04-22 NOTE — P.PN
Subjective





Patient is seen in follow-up for rhabdomyolysis which has been improving.  She 

is off IV fluids at this time.  Oral intake has been good.  Denies chest pain or

shortness of breath.  GFR at baseline.





Vital signs are stable.


General: The patient appeared well nourished and normally developed. 


HEENT: Head exam is unremarkable. Neck is without jugular venous distension.


LUNGS: Lungs are clear to auscultation and percussion. Breath sounds decreased.


HEART: Rate and Rhythm are regular. First and second heart sounds normal. No 

murmurs, rubs or gallops. 


ABDOMEN: Abdominal exam reveals normal bowel sounds. Non-tender and non-

distended. No evidence of peritonitis.


EXTREMITITES: No clubbing, cyanosis, or edema.





Objective





- Vital Signs


Vital signs: 


                                   Vital Signs











Temp  98 F   04/22/19 05:58


 


Pulse  69   04/22/19 05:58


 


Resp  16   04/22/19 05:58


 


BP  129/67   04/22/19 05:58


 


Pulse Ox  98   04/22/19 05:58








                                 Intake & Output











 04/21/19 04/22/19 04/22/19





 18:59 06:59 18:59


 


Intake Total 1689.189 247.225 


 


Output Total 300  


 


Balance 1389.189 247.225 


 


Weight 49.895 kg  


 


Intake:   


 


  Intake, IV Titration 1089.189 247.225 





  Amount   


 


    Heparin Sod,Pork in 0.45% 89.189 87.225 





    NaCl 25,000 unit In 0.45   





    % NaCl 1 250ml.bag @ 18   





    UNITS/KG/HR 8.034 mls/hr   





    IV .Q24H JARET Rx#:   





    [id number]   


 


    Sodium Chloride 0.9% 1, 1000 160 





    000 ml @ 150 mls/hr IV .   





    Q6H40M JARET Rx#:[id number]   


 


  Oral 600  


 


Output:   


 


  Urine 300  


 


Other:   


 


  Voiding Method Bedpan Bedpan 


 


  # Voids 2 3 














- Labs


CBC & Chem 7: 


                                 04/22/19 07:53





                                 04/21/19 07:59


Labs: 


                  Abnormal Lab Results - Last 24 Hours (Table)











  04/21/19 04/21/19 04/21/19 Range/Units





  07:59 07:59 07:59 


 


RBC  3.29 L    (3.80-5.40)  m/uL


 


Hgb  10.3 L    (11.4-16.0)  gm/dL


 


Hct  30.6 L    (34.0-46.0)  %


 


APTT   74.5 H   (22.0-30.0)  sec


 


Chloride    111 H  ()  mmol/L


 


BUN    21 H  (7-17)  mg/dL


 


Glucose    113 H  (74-99)  mg/dL


 


Calcium    8.1 L  (8.4-10.2)  mg/dL


 


Creatine Kinase     ()  U/L














  04/21/19 04/22/19 04/22/19 Range/Units





  07:59 07:53 07:53 


 


RBC   3.45 L   (3.80-5.40)  m/uL


 


Hgb   10.9 L   (11.4-16.0)  gm/dL


 


Hct   33.2 L   (34.0-46.0)  %


 


APTT    42.8 H  (22.0-30.0)  sec


 


Chloride     ()  mmol/L


 


BUN     (7-17)  mg/dL


 


Glucose     (74-99)  mg/dL


 


Calcium     (8.4-10.2)  mg/dL


 


Creatine Kinase  2611 H*    ()  U/L














Assessment and Plan


Plan: 





Assessment:


1.  Rhabdomyolysis secondary to immobility.  Improved with IV hydration.  CK 

levels trending down.


2.  Right lower extremity DVT maintained on IV heparin.


3.  Benign hypertension.  Controlled.





Plan:


Start normal saline at 50 mL an hour.


Encourage oral intake.


Hold lisinopril for systolic blood pressure less than 120.


Repeat CK level today and again in the morning.

## 2019-04-22 NOTE — PN
PROGRESS NOTE



DATE OF SERVICE:

04/22/2019



DATA:

FULL CODE.



Her height is 5 feet 6 inches, weight 49.895 kg. BSA 1.55 m2. BMI 17.8 kg/m2.

ALLERGIES UNKNOWN.



This patient was admitted to the hospital on the weekend in my temporary absence by Dr. Danyel Pope and is subsequently followed by me. On admission it was found that the

patient had fallen down beside the bed.  She could not recall the event clearly.  They

found that she had rhabdomyolysis. Subsequently they found that she also had right leg

DVT by venous Doppler study. The venous Doppler study indicated that her right leg was

positive for DVT of the femoral vein that extended to the popliteal vein.  There was no

evidence of DVT in the left leg.  That was done on admission, 4/20/2019.  In the ER on

admission her EKG was found to show normal sinus rhythm, with the EKG interpreted as an

anterior infarct, undetermined, with the abnormal EKG.  Her chest x-ray showed

hyperinflation and could be underlying emphysema.  However, the patient never smoked in

the past.  The patient also had a CT scan of the brain with the underlying confusion

and past history of schizophrenia with paranoia.  However, patient could be bipolar and

depressed as well.  CT scan showed no acute intracranial abnormality. In the emergency

room at that time she was seen by Dr. Nba Egan after being brought by EMS, having

been found on the floor and lying on her right side; however, when I examined her the

bruises were on the left side of the face. She stated that she could not get herself up

into the bed and she slid off the bed onto the floor and she was lying on her left side

most of the evening. This makes sense, because the bruises were on the left facial

side.  She initially complained of left shoulder pain, which has resolved.  She denied

any headache or back pain in the ER.  No fever, no chills.  No nausea, no vomiting. She

responded to questions, per the paramedic as noted in the ER dictation.  They stated

also that she had anxiety, depression.



On my examination, the patient has had a significant loss of weight; more than 50% of

her weight.  On this admission her weight was 49.89 kg and she was initially 211

pounds.  In the ER they did a CT angiogram of the chest to rule out PE after they found

that she had DVT of the right leg. The impression was no evidence of pulmonary emboli.

She had trace pleural effusion with mild diffuse anasarca, and that is due to the

initial overload of the fluid.  She has also a small hiatal hernia.  With these

findings, consultations with Psychiatry and Nephrology were ordered. Nephrology

adjusted her IV fluid because of the rhabdomyolysis and high CPK and started with high

 mL/hour and gradually decreased it.  She had normal renal function.



The patient was seen by Dr. Kerns and subsequently by Dr. Onofre. The first

psychiatrist does not want to be involved and the second psychiatrist stated that she

has chronic schizophrenia, stable on Haldol, and refuses to take an increase of

Cogentin; and physically degenerating and needs placement in a skilled nursing

facility.



Her current list of medications is:

1. Vasotec 20 mg daily for hypertension.

2. Cogentin, which is benztropine mesylate, 0.5 mg 3 times a day.

3. She was taking Keflex 500 mg t.i.d.

4. Haldol 5 mg once daily.

She has a history of numerous inpatient psychiatric admissions, the last one in

November 2016. Dr. Onofre stated that she was in the past, not at this time in

November of 2016, on Seroquel 600 mg at bedtime and Abilify 15 mg daily and Ativan 1 mg

twice a day.  She has been on Haldol in the past and Resperal as well.  She has

significant extrapyramidal symptoms with the Haldol. No history of suicidal attempt.

However, his current recommendation _____ chronic schizophrenia, stable on Haldol and

refuses to take an increase of Cogentin.



PHYSICAL EXAMINATION:

On today's exam, her vital signs are temperature 98.4, pulse 69, respiratory rate 16,

blood pressure 121/58 and mean blood pressure 79.  Her oxygenation is fluctuating

between 98% and 93%.

Patient is conscious, alert, able to communicate.  However, she has tardive dyskinesia

with extrapyramidal symptoms from previous medication. She was completely _____ herself

at home, as she is in an apartment and could not eat and lost weight, nobody observing

her. She needs definitely a nursing home or a psychiatric facility.

On the examination, HEENT was negative.

Neck was supple.

Chest was clear to auscultation and percussion.

The heart was in regular sinus rhythm.

The abdomen was soft with positive bowel sounds.

EXTREMITIES:  No edema.



In the past she had ulceration of the right leg that was treated by Dr. Mathews. She

has also movement of the mouth that is chronically present with the tardive dyskinesia.

The nurses told me that she ate very well today but needs somebody to give it to her

and encourage her to eat.



With these findings, we will continue the current treatment.

1. She is on heparin infusion due to the impression of DVT of the right leg extending

    to the popliteal.

2. Continue the antidepressant, lisinopril 40 mg, and the haloperidol once a day,

    Cogentin 0.5 mg 3 times a day and Keflex.  However, we will see if there is any

    need for the Keflex at this time. We can ask for a plan for nursing home placement

    and treatment as an outpatient by a psychiatrist, as Dr. Onofre did not accept her

    in his unit of psychiatric care.





MMSHAMIKA / HOLLIEN: [id number] / Job#: [id number]

## 2019-04-22 NOTE — P.CN
Psychiatric Consult





- .


Consult date: 04/22/19


Consult:: 





04/22/19 14:05


tremor and head neck and consideration of removing Haldol





Assessment and Plan


Assessment: 


This is a 73-year-old female who is brought in by EMS for evaluation after she 

was found on the floor where she laid on the right.  She stated that she could 

not get herself up into her bed and slid off the bed onto the floor she lay on 

her left side most of the evening.  She initially complain some left shoulder 

pain has none now at this time she denies any head neck or back pain.  No fevers

chills nausea vomiting sweats no other symptoms no focal deficits.  She was slow

to respond to questioning per paramedics.


MD Complaint: fall, other





- Related Data


                                Home Medications











 Medication  Instructions  Recorded  Confirmed


 


Enalapril [Vasotec] 20 mg PO DAILY 11/15/18 04/20/19


 


Benztropine Mesylate 0.5 mg PO TID 04/20/19 04/20/19


 


Cephalexin [Keflex] 500 mg PO TID 04/20/19 04/20/19


 


Haloperidol [Haldol] 5 mg PO DAILY 04/20/19 04/20/19











                                    Allergies











Allergy/AdvReac Type Severity Reaction Status Date / Time


 


No Known Allergies Allergy   Verified 04/20/19 10:31














Past Medical History


Past Medical History: Hypertension


History of Any Multi-Drug Resistant Organisms: None Reported


Past Surgical History: No Surgical Hx Reported


Past Psychological History: Anxiety, Depression


Smoking Status: Never smoker


Past Alcohol Use History: None Reported


Past Drug Use History: None Reported





History of present illness: This 73-year-old female with chronic schizophrenia 

who has rhabdomyolysis and was asked to reconsult regarding use of Haldol and 

his chronic schizophrenic who has tremor in her head and jaw.  Discussed discuss

 the case with Dr. Rogers this morning recommending keeping patient on Haldol 

and increasing Cogentin to 1 mg 3 times a day but aptient refuses.





PAST PSYCHIATRIC HISTORY: The patient has had numerous inpatient psychiatric 

admissions the last one was in November 2016.  She is currently on Seroquel 600 

mg at bedtime Abilify 15 mg daily Ativan 1 mg twice daily.  She has been on 

Haldol the past and possibly Risperdal.  She had significant extrapyramidal 

symptoms with Haldol.  No history of suicide attempts.


PMH: Hypertension, possible urinary tract infection


ALLERGIES: NO KNOWN DRUG ALLERGIES


MEDICATIONS: Lasix Norvasc vitamin D Zestril


CHEMICAL DEPENDENCY HISTORY: No use of alcohol marijuana or other illicit drugs.

  She has never been placed in residential treatment for chemical dependency 

reasons


FAMILY PSYCHIATRIC HISTORY:  None reported, no suicides in the family


FAMILY CHEMICAL DEPENDENCY HISTORY: Reported


SOCIAL HISTORY: The patient is 71 years old she single she has never been 

 and she has no children.  She currently resides at [facility].  

She has a brother who is supportive.  She continues to pay her own bills and up 

until recently was still driving.  She graduated high school and earned a 

bachelor's from [employer] no history of  service.  Financially she 

is secure as there was an inheritance that helps cover her expenses.  No legal 

history no history of violence.  No abuse history.  She is not employed.


Musculoskeletal Examination - 


Abnormal/Involuntary Movements: [ tremors,  tics]


Strength: [greater than antigravity (greater than/equal to 3/5) in all 

extremities, weakness:]


Muscle Tone: [ dystonia


Gait: [  wide-based]


Station: [ unsteady]








mental status examination:





This is a 73-year-old  email was seen lying in bed with his noticeable 

tremors of mouth and neck.  She looks older than her stated age.  Her speech and

language her slow monotone soft.  She has no trouble expressing herself.  

Attitude and behaviors cooperative.  Mood is euthymic.  Affect is restricted due

to EPS.  Orientation person place and situation.  Thought content within normal.

 Risk factors she denies suicidal or homicidal ideation.  Perception within 

normal.  Thought process goal oriented somewhat concrete and presentation and 

wants to be independent.  Concentration 10 tension slightly impaired per 

observation and interview with the patient.  Recent remote memory within normal.

 Intelligence average.  Judgment and insight fair she really needs placement in 

a skilled nursing facility or assisted living.  Do not stop her Haldol or change

her dose she is stable on.


Stable


Psychiatric impression: Chronic schizophrenic stable on Haldol and refuses to 

take an increase of Cogentin.


Physically degenerating and needs placement in SNIF or skilled facility





Thank you for the consult





Papa Onofre D.O. PhD


(1) Schizophrenia


Current Visit: No   Status: Acute   Priority: High   Code(s): F20.9 - 

SCHIZOPHRENIA, UNSPECIFIED   SNOMED Code(s): 27358232


   


Time with Patient: Less than 30

## 2019-04-23 LAB
ALBUMIN SERPL-MCNC: 2.8 G/DL (ref 3.5–5)
ALP SERPL-CCNC: 42 U/L (ref 38–126)
ALT SERPL-CCNC: 67 U/L (ref 9–52)
ANION GAP SERPL CALC-SCNC: 0 MMOL/L
AST SERPL-CCNC: 62 U/L (ref 14–36)
BASOPHILS # BLD AUTO: 0 K/UL (ref 0–0.2)
BASOPHILS NFR BLD AUTO: 1 %
BUN SERPL-SCNC: 16 MG/DL (ref 7–17)
CALCIUM SPEC-MCNC: 8.3 MG/DL (ref 8.4–10.2)
CHLORIDE SERPL-SCNC: 112 MMOL/L (ref 98–107)
CK SERPL-CCNC: 602 U/L (ref 30–135)
CO2 SERPL-SCNC: 26 MMOL/L (ref 22–30)
EOSINOPHIL # BLD AUTO: 0.2 K/UL (ref 0–0.7)
EOSINOPHIL NFR BLD AUTO: 3 %
ERYTHROCYTE [DISTWIDTH] IN BLOOD BY AUTOMATED COUNT: 3.32 M/UL (ref 3.8–5.4)
ERYTHROCYTE [DISTWIDTH] IN BLOOD: 13.5 % (ref 11.5–15.5)
GLUCOSE SERPL-MCNC: 92 MG/DL (ref 74–99)
HCT VFR BLD AUTO: 31.8 % (ref 34–46)
HGB BLD-MCNC: 10.4 GM/DL (ref 11.4–16)
LYMPHOCYTES # SPEC AUTO: 1.2 K/UL (ref 1–4.8)
LYMPHOCYTES NFR SPEC AUTO: 23 %
MAGNESIUM SPEC-SCNC: 1.7 MG/DL (ref 1.6–2.3)
MCH RBC QN AUTO: 31.4 PG (ref 25–35)
MCHC RBC AUTO-ENTMCNC: 32.8 G/DL (ref 31–37)
MCV RBC AUTO: 95.6 FL (ref 80–100)
MONOCYTES # BLD AUTO: 0.4 K/UL (ref 0–1)
MONOCYTES NFR BLD AUTO: 7 %
NEUTROPHILS # BLD AUTO: 3.4 K/UL (ref 1.3–7.7)
NEUTROPHILS NFR BLD AUTO: 64 %
PH UR: 5.5 [PH] (ref 5–8)
PLATELET # BLD AUTO: 175 K/UL (ref 150–450)
POTASSIUM SERPL-SCNC: 4.2 MMOL/L (ref 3.5–5.1)
PROT SERPL-MCNC: 5 G/DL (ref 6.3–8.2)
SODIUM SERPL-SCNC: 138 MMOL/L (ref 137–145)
SP GR UR: 1.02 (ref 1–1.03)
UROBILINOGEN UR QL STRIP: <2 MG/DL (ref ?–2)
WBC # BLD AUTO: 5.2 K/UL (ref 3.8–10.6)

## 2019-04-23 RX ADMIN — APIXABAN SCH MG: 5 TABLET, FILM COATED ORAL at 21:12

## 2019-04-23 RX ADMIN — HALOPERIDOL SCH MG: 5 TABLET ORAL at 09:15

## 2019-04-23 RX ADMIN — CEFAZOLIN SCH MLS/HR: 330 INJECTION, POWDER, FOR SOLUTION INTRAMUSCULAR; INTRAVENOUS at 05:59

## 2019-04-23 RX ADMIN — BENZTROPINE MESYLATE SCH MG: 0.5 TABLET ORAL at 21:12

## 2019-04-23 RX ADMIN — BENZTROPINE MESYLATE SCH MG: 0.5 TABLET ORAL at 17:05

## 2019-04-23 RX ADMIN — BENZTROPINE MESYLATE SCH MG: 0.5 TABLET ORAL at 09:15

## 2019-04-23 RX ADMIN — APIXABAN SCH MG: 5 TABLET, FILM COATED ORAL at 13:19

## 2019-04-23 RX ADMIN — LISINOPRIL SCH MG: 20 TABLET ORAL at 09:15

## 2019-04-23 NOTE — P.PN
Subjective


Progress Note Date: 04/23/19 (right leg DVT, rhabdomyolysis.)


Principal diagnosis: 


diagnosis: #1 rhabdomyolysis with a history of fall from bed with a CK more than

9000 treated with IV fluid and consultation with nephrology.


#2 acute deep vein thrombosis of the right lower extremities extended from 

femoral to popliteal.


#3 underlying schizophrenia with paranoia with the underlying history of 

depression seen by Dr. Thmoas and he'll continue the same medication without 

changes.


#4 tardive dyskinesia with extrapyramidal symptoms from her previous and current

medication.


#5 hypertension with hypertensive heart disease.


#6 anticoagulant started today on 04/23/2019 with eliquis 10 mg twice a day for 

7 days followed by 5 mg twice a day for 3 months duration for unprovoked DVT of 

the right lower extremities after discontinuation of unfractionated heparin.








a progress note date of service 04/23/2019.


Patient seen evaluated face-to-face discussed with her the current plan and 

advised with the changes of the medication.


laboratories:, White count 5.2 hemoglobin 10.4 hematocrit 31.8 MCV 95.6 with the

underlying anemia will be check in iron study, occult blood of the stool, and 

vitamin B12 level.


Her PTT 60.2 has been discontinued the heparin and started on requests today 10 

mg.


CK on admission 9011 x 7 724 and gradually improved and S2 with.  As of today 

602 with theglomerular filtration rate more than 90.


She has mild elevation of AST and a LT with normal alkaline phosphatase and 

we'll obtain lab for hepatitis acute panel.


Her protein and albumin was 5/2.8 however patient now is starting to eat after 

significant weight loss more than 50%.  Patient was 211 pounds and currently 

107, but she have a good appetite and able to eat and will improve her protein 

intake.


Urine analysis was negative.


On exam patient conscious alert she oriented however she had difficulty for 

speak with the continuous positive dyskinesia.  She able to eat and swallow 

without dysphagia.


Head was normocephalic and atraumatic pupil was equal reactive conjunctiva was 

pink sclera was nonicteric.  And trachea midline.  The chest is clear to 

auscultation percussion.  Heart regular sinus rhythm no dysrhythmia abdomen was 

soft positive bowel sounds no tenderness.  The extremities no edema positive 

pulses and she had difficulties of ambulation she has been seen by the PT and OT

and they did some movement at bedside and she was walking and bending over.  

With the underlying arthritis of the spine and difficulty with walking.  Patient

need for further inpatient rehabilitation at [facility]


Assessment: #1 following beside the bed with high CK muscular origin with the 

underlying rhabdomyolysis treated with hydration and nephrology consultation. 


#2 unprovoked right DVT extending from the femoral to popliteal treated with 

unfractionated heparin for 3 days followed by eliquis in the hospital 10 mg 

twice a day for total of 7 days followed by 5 mg twice a day continue for 3 

months then recheck the right lower extremities for follow-up on the DVT.


#3 underlying schizophrenia with paranoia and depression with withdrawal .  And 

need for further psychiatric consultation in [facility] for follow-up on her 

current medication and also change it if needed.





Plan.


Consultation for tomorrow [facility] requested for nursing 

home as discussed with her caregiver her brother Mr. Persaud and he indicated 

that he would like also [facility] as he is the caregiver.  And we will wait 

for available bed tomorrow.  Heparin discontinued, anticoagulation started 

orally.


We hope that results of the laboratory will be available tomorrow.  And the 

 and discharge planner has been consulted.





Objective





- Vital Signs


Vital signs: 


                                   Vital Signs











Temp  98.2 F   04/23/19 11:51


 


Pulse  95   04/23/19 11:51


 


Resp  17   04/23/19 11:51


 


BP  126/64   04/23/19 11:51


 


Pulse Ox  96   04/23/19 11:51








                                 Intake & Output











 04/22/19 04/23/19 04/23/19





 18:59 06:59 18:59


 


Intake Total 774.848 880.152 


 


Balance 774.848 880.152 


 


Intake:   


 


  Intake, IV Titration 534.848 290.152 





  Amount   


 


    Heparin Sod,Pork in 0.45% 134.848 115.152 





    NaCl 25,000 unit In 0.45   





    % NaCl 1 250ml.bag @ 18   





    UNITS/KG/HR 8.034 mls/hr   





    IV .Q24H JARET Rx#:   





    [id number]   


 


    Sodium Chloride 0.9% 1, 400 175 





    000 ml @ 50 mls/hr IV .   





    Q20H JARET Rx#:[id number]   


 


  Oral 240 590 


 


Other:   


 


  Voiding Method Bedpan Bedpan 





  Diaper 


 


  # Voids 2 3 














- Labs


CBC & Chem 7: 


                                 04/23/19 07:28





                                 04/23/19 07:28


Labs: 


                  Abnormal Lab Results - Last 24 Hours (Table)











  04/22/19 04/23/19 04/23/19 Range/Units





  15:12 07:28 07:28 


 


RBC   3.32 L   (3.80-5.40)  m/uL


 


Hgb   10.4 L   (11.4-16.0)  gm/dL


 


Hct   31.8 L   (34.0-46.0)  %


 


APTT  60.3 H    (22.0-30.0)  sec


 


Chloride    112 H  ()  mmol/L


 


Calcium    8.3 L  (8.4-10.2)  mg/dL


 


AST    62 H  (14-36)  U/L


 


ALT    67 H  (9-52)  U/L


 


Creatine Kinase    602 H  ()  U/L


 


Total Protein    5.0 L  (6.3-8.2)  g/dL


 


Albumin    2.8 L  (3.5-5.0)  g/dL














  04/23/19 Range/Units





  07:28 


 


RBC   (3.80-5.40)  m/uL


 


Hgb   (11.4-16.0)  gm/dL


 


Hct   (34.0-46.0)  %


 


APTT  60.2 H  (22.0-30.0)  sec


 


Chloride   ()  mmol/L


 


Calcium   (8.4-10.2)  mg/dL


 


AST   (14-36)  U/L


 


ALT   (9-52)  U/L


 


Creatine Kinase   ()  U/L


 


Total Protein   (6.3-8.2)  g/dL


 


Albumin   (3.5-5.0)  g/dL

## 2019-04-23 NOTE — P.PN
Subjective





Patient is seen in follow-up for rhabdomyolysis which has been improving.  She 

is maintained on normal saline at 50 mL an hour.  Oral intake has been good.  

Denies chest pain or shortness of breath.  GFR at baseline.  CK level down to 

602 today.





Vital signs are stable.


General: The patient appeared well nourished and normally developed. 


HEENT: Head exam is unremarkable. Neck is without jugular venous distension.


LUNGS: Lungs are clear to auscultation and percussion. Breath sounds decreased.


HEART: Rate and Rhythm are regular. First and second heart sounds normal. No 

murmurs, rubs or gallops. 


ABDOMEN: Abdominal exam reveals normal bowel sounds. Non-tender and non-

distended. No evidence of peritonitis.


EXTREMITITES: No clubbing, cyanosis, or edema.





Objective





- Vital Signs


Vital signs: 


                                   Vital Signs











Temp  98.7 F   04/23/19 04:58


 


Pulse  65   04/23/19 04:58


 


Resp  16   04/23/19 04:58


 


BP  137/63   04/23/19 04:58


 


Pulse Ox  97   04/23/19 04:58








                                 Intake & Output











 04/22/19 04/23/19 04/23/19





 18:59 06:59 18:59


 


Intake Total 774.848 880.152 


 


Balance 774.848 880.152 


 


Intake:   


 


  Intake, IV Titration 534.848 290.152 





  Amount   


 


    Heparin Sod,Pork in 0.45% 134.848 115.152 





    NaCl 25,000 unit In 0.45   





    % NaCl 1 250ml.bag @ 18   





    UNITS/KG/HR 8.034 mls/hr   





    IV .Q24H JARET Rx#:   





    [id number]   


 


    Sodium Chloride 0.9% 1, 400 175 





    000 ml @ 50 mls/hr IV .   





    Q20H JARET Rx#:[id number]   


 


  Oral 240 590 


 


Other:   


 


  Voiding Method Bedpan Bedpan 





  Diaper 


 


  # Voids 2 3 














- Labs


CBC & Chem 7: 


                                 04/23/19 07:28





                                 04/23/19 07:28


Labs: 


                  Abnormal Lab Results - Last 24 Hours (Table)











  04/22/19 04/23/19 04/23/19 Range/Units





  15:12 07:28 07:28 


 


RBC   3.32 L   (3.80-5.40)  m/uL


 


Hgb   10.4 L   (11.4-16.0)  gm/dL


 


Hct   31.8 L   (34.0-46.0)  %


 


APTT  60.3 H    (22.0-30.0)  sec


 


Chloride    112 H  ()  mmol/L


 


Calcium    8.3 L  (8.4-10.2)  mg/dL


 


AST    62 H  (14-36)  U/L


 


ALT    67 H  (9-52)  U/L


 


Creatine Kinase    602 H  ()  U/L


 


Total Protein    5.0 L  (6.3-8.2)  g/dL


 


Albumin    2.8 L  (3.5-5.0)  g/dL














  04/23/19 Range/Units





  07:28 


 


RBC   (3.80-5.40)  m/uL


 


Hgb   (11.4-16.0)  gm/dL


 


Hct   (34.0-46.0)  %


 


APTT  60.2 H  (22.0-30.0)  sec


 


Chloride   ()  mmol/L


 


Calcium   (8.4-10.2)  mg/dL


 


AST   (14-36)  U/L


 


ALT   (9-52)  U/L


 


Creatine Kinase   ()  U/L


 


Total Protein   (6.3-8.2)  g/dL


 


Albumin   (3.5-5.0)  g/dL














Assessment and Plan


Plan: 





Assessment:


1.  Rhabdomyolysis secondary to immobility.  Improved with IV hydration.  CK 

levels trending down.


2.  Right lower extremity DVT maintained on IV heparin.


3.  Benign hypertension.  Controlled.





Plan:


Hep-Lock IV fluids.


Encourage oral intake.


Hold lisinopril for systolic blood pressure less than 120.

## 2019-04-24 VITALS
SYSTOLIC BLOOD PRESSURE: 139 MMHG | RESPIRATION RATE: 17 BRPM | DIASTOLIC BLOOD PRESSURE: 68 MMHG | HEART RATE: 85 BPM | TEMPERATURE: 99.2 F

## 2019-04-24 LAB
BASOPHILS # BLD AUTO: 0 K/UL (ref 0–0.2)
BASOPHILS NFR BLD AUTO: 1 %
EOSINOPHIL # BLD AUTO: 0.2 K/UL (ref 0–0.7)
EOSINOPHIL NFR BLD AUTO: 3 %
ERYTHROCYTE [DISTWIDTH] IN BLOOD BY AUTOMATED COUNT: 3.23 M/UL (ref 3.8–5.4)
ERYTHROCYTE [DISTWIDTH] IN BLOOD: 13.7 % (ref 11.5–15.5)
HCT VFR BLD AUTO: 30.6 % (ref 34–46)
HGB BLD-MCNC: 10.6 GM/DL (ref 11.4–16)
LYMPHOCYTES # SPEC AUTO: 1.1 K/UL (ref 1–4.8)
LYMPHOCYTES NFR SPEC AUTO: 17 %
MCH RBC QN AUTO: 32.9 PG (ref 25–35)
MCHC RBC AUTO-ENTMCNC: 34.8 G/DL (ref 31–37)
MCV RBC AUTO: 94.7 FL (ref 80–100)
MONOCYTES # BLD AUTO: 0.5 K/UL (ref 0–1)
MONOCYTES NFR BLD AUTO: 7 %
NEUTROPHILS # BLD AUTO: 4.6 K/UL (ref 1.3–7.7)
NEUTROPHILS NFR BLD AUTO: 70 %
PLATELET # BLD AUTO: 185 K/UL (ref 150–450)
WBC # BLD AUTO: 6.5 K/UL (ref 3.8–10.6)

## 2019-04-24 RX ADMIN — LISINOPRIL SCH MG: 20 TABLET ORAL at 08:20

## 2019-04-24 RX ADMIN — APIXABAN SCH MG: 5 TABLET, FILM COATED ORAL at 08:20

## 2019-04-24 RX ADMIN — BENZTROPINE MESYLATE SCH MG: 0.5 TABLET ORAL at 08:20

## 2019-04-24 RX ADMIN — HALOPERIDOL SCH MG: 5 TABLET ORAL at 08:20

## 2019-04-24 NOTE — P.DS
Providers


Date of admission: 


04/20/19 11:07


Discharge diagnoses:


#1 Acute rhabdomyolysis improved and the last CPK is 602 with a hemodynamically 

stable, cleared from the nephrology for discharge.


#2  Right lower extremities DVT, currently treated was unfractionated heparin 

for 3 days followed byEliquis 10 mg twice a day for 7 days due to follow by 5 mg

twice a day for 3 months.


#3 schizophrenia with paranoia, depression, associated as well as tardive 

dyskinesia.





He followed by psychiatrist in [facility] for as outside Dr. Riley.


#4 hypertension controlled.


#5 significant weight loss more than 50%, used to be 211 pounds currently 107 

pounds due to her psychiatric disorder .


#6 right lower extremities scarring history of venous DICTATED by Dr. Charles.


#7 walking disability with severe bending over with the need for further 

rehabilitation.


#8 frequent small with the bruises in the left fascial found in her apartment on

the floor.


#9 speech disturbance associated with antipsychotic medication.  Secondary to 

extrapyramidal effect.


Attending physician: 


Waqas George





Consults: 





                                        





04/20/19 11:08


Consult Physician Routine 


   Consulting Provider: Cleopatra Garcia


   Consult Reason/Comments: Rhabdomyolysis


   Do you want consulting provider notified?: Yes





04/20/19 13:49


Consult Physician Routine 


   Consulting Provider: Papa Onofre


   Consult Reason/Comments: psych eval, pt home med is haldol questioning dose


   Do you want consulting provider notified?: Yes











Primary care physician: 


Waqas George


This is a discharge summary: Patient stable general condition and transferred to

[facility] and rehab today.





Vital signs stable with blood pressure 122/68 and pulse ox 96% respiratory rate 

16 and her heart is regular sinus rhythm.


On discharge: Patient is conscious aler will be able to eat and swallow and 

interrupted speech.  Due to her extra pyramidal effect from her medication 

although.





Patient seen by a psychiatrist in the hospital and they did not change her 

medication.  Temperature 99.6 axilary.


HEENT negative.


Neck was supple no JVD no thyromegaly no lymphadenopathy trachea midline.


Chest was clear for auscultation and percussion no wheezes or rhonchi.


Heart sinus rhythm.


Abdomen soft positive bowel sounds no organ enlargement.


Extremities: DVT by venous duplex study with some edema on the left lower 

extremities.  And history of previous venous ulcer treated by vascular surgeon 

in the past.  No need for further treatment.


Neurologically: No evidence of strokes in the past.


Psychiatry: Schizophrenia with paranoia and depression.





Presentation to the ER:


Patient found at her apartment with the bruising on the left side of the fascial

and the neck brought by ambulance during the weekend and subsequently found that

her CK is elevated and 9[address]38 and treated with IV fluids and nephrology 

consultation and admitted to the floor after being obtained ultrasound of the 

lower extremities which found that she had a right DVT.





Hospital course:


Patient started on unfractionated heparin, consultation with nephrology, IV 

fluid, and subsequently has stabilized heparin discontinued and started on a 

courseof eliquis 10 mg twice a day for total of 7 days she started yesterday on 

the 23rd and the left will be 6 days of 10 mg twice a day.


Subsequently noted to be changes to 5 mg twice a day for total 3 months.  

Subsequently she will need ultrasound of the right lower extremities to evaluate

the resolution of the DVT.


Hypertension controlled.  And physical therapy was consulted during her hospital

stay and patient has difficulty with ambulation and need for further evaluation 

and that inpatient rehab.


Patient Condition at Discharge: Fair





Plan - Discharge Summary


Discharge Rx Participant: No


New Discharge Prescriptions: 


New


   Apixaban [Eliquis] 10 mg PO BID  tab


   Lisinopril [Zestril] 40 mg PO DAILY  tab





Continue


   Haloperidol [Haldol] 5 mg PO DAILY


   Benztropine Mesylate 0.5 mg PO TID





Discontinued


   Enalapril [Vasotec] 20 mg PO DAILY


   Cephalexin [Keflex] 500 mg PO TID


Discharge Medication List





Benztropine Mesylate 0.5 mg PO TID 04/20/19 [History]


Haloperidol [Haldol] 5 mg PO DAILY 04/20/19 [History]


Apixaban [Eliquis] 10 mg PO BID  tab 04/24/19 [Rx]


Lisinopril [Zestril] 40 mg PO DAILY  tab 04/24/19 [Rx]








Follow up Appointment(s)/Referral(s): 


Waqas George MD [Primary Care Provider] - 1-2 days

## 2019-04-24 NOTE — P.PN
Subjective





Patient is seen in follow-up for rhabdomyolysis which has been improving.  She 

is off IV fluids .  Oral intake has been good.  Denies chest pain or shortness 

of breath.  GFR at baseline.  CK level down to 602 as of yesterday.  No active 

complaints.  Hemodynamically stable.





Vital signs are stable.


General: The patient appeared well nourished and normally developed. 


HEENT: Head exam is unremarkable. Neck is without jugular venous distension.


LUNGS: Lungs are clear to auscultation and percussion. Breath sounds decreased.


HEART: Rate and Rhythm are regular. First and second heart sounds normal. No 

murmurs, rubs or gallops. 


ABDOMEN: Abdominal exam reveals normal bowel sounds. Non-tender and non-

distended. No evidence of peritonitis.


EXTREMITITES: No clubbing, cyanosis, or edema.





Objective





- Vital Signs


Vital signs: 


                                   Vital Signs











Temp  99.1 F   04/24/19 05:00


 


Pulse  78   04/24/19 05:00


 


Resp  16   04/24/19 05:00


 


BP  122/68   04/24/19 05:00


 


Pulse Ox  96   04/24/19 05:00








                                 Intake & Output











 04/23/19 04/24/19 04/24/19





 18:59 06:59 18:59


 


Intake Total 600 240 


 


Output Total  500 


 


Balance 600 -260 


 


Intake:   


 


  Oral 600 240 


 


Output:   


 


  Urine  500 


 


Other:   


 


  Voiding Method Bedpan Bedpan 





 Diaper Diaper 





 Incontinent Incontinent 


 


  # Voids 2 1 














- Labs


CBC & Chem 7: 


                                 04/24/19 06:21





                                 04/23/19 07:28


Labs: 


                  Abnormal Lab Results - Last 24 Hours (Table)











  04/23/19 04/24/19 Range/Units





  07:28 06:21 


 


RBC   3.23 L  (3.80-5.40)  m/uL


 


Hgb   10.6 L  (11.4-16.0)  gm/dL


 


Hct   30.6 L  (34.0-46.0)  %


 


Chloride  112 H   ()  mmol/L


 


Calcium  8.3 L   (8.4-10.2)  mg/dL


 


AST  62 H   (14-36)  U/L


 


ALT  67 H   (9-52)  U/L


 


Creatine Kinase  602 H   ()  U/L


 


Total Protein  5.0 L   (6.3-8.2)  g/dL


 


Albumin  2.8 L   (3.5-5.0)  g/dL














Assessment and Plan


Plan: 





Assessment:


1.  Rhabdomyolysis secondary to immobility.  Improved with IV hydration.  CK 

levels trending down.


2.  Right lower extremity DVT maintained on eliquis.


3.  Benign hypertension.  Controlled.





Plan:


Encourage oral intake.


Hold lisinopril for systolic blood pressure less than 120.


Stable to be discharged home from nephrology standpoint.

## 2019-06-03 ENCOUNTER — HOSPITAL ENCOUNTER (INPATIENT)
Dept: HOSPITAL 47 - EC | Age: 74
LOS: 4 days | Discharge: NURSING, REHAB OR LONG-TERM CARE | DRG: 557 | End: 2019-06-07
Payer: MEDICARE

## 2019-06-03 VITALS — BODY MASS INDEX: 21.3 KG/M2

## 2019-06-03 DIAGNOSIS — K21.9: ICD-10-CM
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
## 2019-06-03 DIAGNOSIS — T43.505A: ICD-10-CM

## 2019-06-03 DIAGNOSIS — F20.9: ICD-10-CM

## 2019-06-03 DIAGNOSIS — N17.0: ICD-10-CM

## 2019-06-03 DIAGNOSIS — E86.0: ICD-10-CM

## 2019-06-03 DIAGNOSIS — G56.32: ICD-10-CM

## 2019-06-03 DIAGNOSIS — N18.3: ICD-10-CM

## 2019-06-03 DIAGNOSIS — M47.817: ICD-10-CM

## 2019-06-03 DIAGNOSIS — Z82.3: ICD-10-CM

## 2019-06-03 DIAGNOSIS — I48.91: ICD-10-CM

## 2019-06-03 DIAGNOSIS — T50.2X5A: ICD-10-CM

## 2019-06-03 DIAGNOSIS — M62.82: Primary | ICD-10-CM

## 2019-06-03 DIAGNOSIS — G31.9: ICD-10-CM

## 2019-06-03 DIAGNOSIS — G24.01: ICD-10-CM

## 2019-06-03 DIAGNOSIS — I95.9: ICD-10-CM

## 2019-06-03 DIAGNOSIS — R29.6: ICD-10-CM

## 2019-06-03 DIAGNOSIS — S00.81XA: ICD-10-CM

## 2019-06-03 DIAGNOSIS — S31.119A: ICD-10-CM

## 2019-06-03 DIAGNOSIS — Z79.899: ICD-10-CM

## 2019-06-03 DIAGNOSIS — R73.9: ICD-10-CM

## 2019-06-03 DIAGNOSIS — Z83.3: ICD-10-CM

## 2019-06-03 DIAGNOSIS — M46.97: ICD-10-CM

## 2019-06-03 DIAGNOSIS — F31.9: ICD-10-CM

## 2019-06-03 DIAGNOSIS — R64: ICD-10-CM

## 2019-06-03 DIAGNOSIS — D64.9: ICD-10-CM

## 2019-06-03 DIAGNOSIS — E55.9: ICD-10-CM

## 2019-06-03 DIAGNOSIS — K56.41: ICD-10-CM

## 2019-06-03 DIAGNOSIS — I87.8: ICD-10-CM

## 2019-06-03 DIAGNOSIS — I12.9: ICD-10-CM

## 2019-06-03 DIAGNOSIS — F41.9: ICD-10-CM

## 2019-06-03 DIAGNOSIS — N39.0: ICD-10-CM

## 2019-06-03 LAB
ALBUMIN SERPL-MCNC: 4.3 G/DL (ref 3.5–5)
ALP SERPL-CCNC: 46 U/L (ref 38–126)
ALT SERPL-CCNC: 97 U/L (ref 9–52)
ANION GAP SERPL CALC-SCNC: 11 MMOL/L
APTT BLD: 20.2 SEC (ref 22–30)
AST SERPL-CCNC: 167 U/L (ref 14–36)
BASOPHILS # BLD AUTO: 0.1 K/UL (ref 0–0.2)
BASOPHILS NFR BLD AUTO: 0 %
BUN SERPL-SCNC: 45 MG/DL (ref 7–17)
CALCIUM SPEC-MCNC: 9.5 MG/DL (ref 8.4–10.2)
CHLORIDE SERPL-SCNC: 111 MMOL/L (ref 98–107)
CK SERPL-CCNC: 2820 U/L (ref 30–135)
CO2 SERPL-SCNC: 23 MMOL/L (ref 22–30)
EOSINOPHIL # BLD AUTO: 0.1 K/UL (ref 0–0.7)
EOSINOPHIL NFR BLD AUTO: 1 %
ERYTHROCYTE [DISTWIDTH] IN BLOOD BY AUTOMATED COUNT: 4.27 M/UL (ref 3.8–5.4)
ERYTHROCYTE [DISTWIDTH] IN BLOOD: 14 % (ref 11.5–15.5)
GLUCOSE SERPL-MCNC: 181 MG/DL (ref 74–99)
GRAN CASTS UR QL COMP ASSIST: 10 /LPF
HCT VFR BLD AUTO: 40.4 % (ref 34–46)
HGB BLD-MCNC: 13.7 GM/DL (ref 11.4–16)
HYALINE CASTS UR QL AUTO: 36 /LPF (ref 0–2)
INR PPP: 1 (ref ?–1.2)
LYMPHOCYTES # SPEC AUTO: 0.3 K/UL (ref 1–4.8)
LYMPHOCYTES NFR SPEC AUTO: 1 %
MAGNESIUM SPEC-SCNC: 2.2 MG/DL (ref 1.6–2.3)
MCH RBC QN AUTO: 32 PG (ref 25–35)
MCHC RBC AUTO-ENTMCNC: 33.8 G/DL (ref 31–37)
MCV RBC AUTO: 94.7 FL (ref 80–100)
MONOCYTES # BLD AUTO: 1.2 K/UL (ref 0–1)
MONOCYTES NFR BLD AUTO: 6 %
NEUTROPHILS # BLD AUTO: 17.9 K/UL (ref 1.3–7.7)
NEUTROPHILS NFR BLD AUTO: 91 %
PH UR: 5.5 [PH] (ref 5–8)
PLATELET # BLD AUTO: 223 K/UL (ref 150–450)
POTASSIUM SERPL-SCNC: 4.6 MMOL/L (ref 3.5–5.1)
PROT SERPL-MCNC: 7.2 G/DL (ref 6.3–8.2)
PROT UR QL: (no result)
PT BLD: 10.4 SEC (ref 9–12)
RBC UR QL: 1 /HPF (ref 0–5)
SODIUM SERPL-SCNC: 145 MMOL/L (ref 137–145)
SP GR UR: 1.05 (ref 1–1.03)
UROBILINOGEN UR QL STRIP: <2 MG/DL (ref ?–2)
WBC # BLD AUTO: 19.6 K/UL (ref 3.8–10.6)
WBC #/AREA URNS HPF: 6 /HPF (ref 0–5)

## 2019-06-03 PROCEDURE — 82550 ASSAY OF CK (CPK): CPT

## 2019-06-03 PROCEDURE — 82553 CREATINE MB FRACTION: CPT

## 2019-06-03 PROCEDURE — 83735 ASSAY OF MAGNESIUM: CPT

## 2019-06-03 PROCEDURE — 85027 COMPLETE CBC AUTOMATED: CPT

## 2019-06-03 PROCEDURE — 80048 BASIC METABOLIC PNL TOTAL CA: CPT

## 2019-06-03 PROCEDURE — 84484 ASSAY OF TROPONIN QUANT: CPT

## 2019-06-03 PROCEDURE — 83550 IRON BINDING TEST: CPT

## 2019-06-03 PROCEDURE — 93005 ELECTROCARDIOGRAM TRACING: CPT

## 2019-06-03 PROCEDURE — 85610 PROTHROMBIN TIME: CPT

## 2019-06-03 PROCEDURE — 74177 CT ABD & PELVIS W/CONTRAST: CPT

## 2019-06-03 PROCEDURE — 96374 THER/PROPH/DIAG INJ IV PUSH: CPT

## 2019-06-03 PROCEDURE — 87086 URINE CULTURE/COLONY COUNT: CPT

## 2019-06-03 PROCEDURE — 85730 THROMBOPLASTIN TIME PARTIAL: CPT

## 2019-06-03 PROCEDURE — 85025 COMPLETE CBC W/AUTO DIFF WBC: CPT

## 2019-06-03 PROCEDURE — 84550 ASSAY OF BLOOD/URIC ACID: CPT

## 2019-06-03 PROCEDURE — 70450 CT HEAD/BRAIN W/O DYE: CPT

## 2019-06-03 PROCEDURE — 83540 ASSAY OF IRON: CPT

## 2019-06-03 PROCEDURE — 71260 CT THORAX DX C+: CPT

## 2019-06-03 PROCEDURE — 36415 COLL VENOUS BLD VENIPUNCTURE: CPT

## 2019-06-03 PROCEDURE — 80053 COMPREHEN METABOLIC PANEL: CPT

## 2019-06-03 PROCEDURE — 82140 ASSAY OF AMMONIA: CPT

## 2019-06-03 PROCEDURE — 82306 VITAMIN D 25 HYDROXY: CPT

## 2019-06-03 PROCEDURE — 72110 X-RAY EXAM L-2 SPINE 4/>VWS: CPT

## 2019-06-03 PROCEDURE — 81001 URINALYSIS AUTO W/SCOPE: CPT

## 2019-06-03 PROCEDURE — 87040 BLOOD CULTURE FOR BACTERIA: CPT

## 2019-06-03 PROCEDURE — 96361 HYDRATE IV INFUSION ADD-ON: CPT

## 2019-06-03 PROCEDURE — 72125 CT NECK SPINE W/O DYE: CPT

## 2019-06-03 PROCEDURE — 99285 EMERGENCY DEPT VISIT HI MDM: CPT

## 2019-06-03 PROCEDURE — 90715 TDAP VACCINE 7 YRS/> IM: CPT

## 2019-06-03 NOTE — ED
General Adult HPI





- General


Chief complaint: Altered Mental Status


Stated complaint: Poss Stroke


Time Seen by Provider: 06/03/19 19:11


Source: EMS


Mode of arrival: EMS


Limitations: altered mental status





- History of Present Illness


Initial comments: 





Dictation was produced using dragon dictation software. please excuse any 

grammatical, word or spelling errors. 





Chief Complaint: 73-year-old female with past medical history of hypertension 

was found down.





History of Present Illness: He rolled female past medical history of 

hypertension.  She was last seen normal yesterday morning to early afternoon.  

Assisted-living facility did not see patient and was concerned.  They found her 

on the floor in her apartment.  Patient unable to provide HPI at this time due 

to mental status.





Unable to obtain ROS secondary to mental status








PHYSICAL EXAM:


General Impression: Lethargic, tremulous, follows commands


HEENT: Normocephalic atraumatic, extra-ocular movements intact, pupils equal and

reactive to light bilaterally, dry mucous membranes


Cardiovascular: Heart regular rate and rhythm, S1&S2 audible, no murmurs, rubs 

or gallops


Chest: Lungs clear to auscultation bilaterally, no rhonchi, no wheeze, no rales


Abdomen: Bowel sounds present, abdomen soft, non-tender, non-distended, no 

organomegaly


Musculoskeletal: Pulses present and equal in all extremities, no peripheral 

edema


Motor:  no focal deficits noted


Neurological: CN II-XII grossly intact, no focal motor or sensory deficits noted


Skin: Abrasion to the left anterior abdomen





ED course: 73-year-old female presents with being found down and altered mental 

status.  Signs upon arrival are within acceptable limits.  At this point there 

is no clear signs of acute traumatic injury.  She is moving all extremities 


Computed tomography scan of the head and C-spine was obtained to evaluate for 

acute traumatic injuries.  CT is of the head C-spine was unremarkable.  CT chest

abdomen pelvis was obtained given that patient is a poor historian and was found

on the ground.  CT shows constipation with rectal fecal impaction.  Patient is 

requesting by mouth.  Denies any abdominal pain.  Laboratory evaluation 

obtained.  Leukocytosis of 19.6, CBC unremarkable.  Metabolic panel shows slight

elevation of renal markers which is abnormal for patient.  Patient has evidence 

of elevated creatinine kinase with the level 2820.  Patient shows also signs of 

dehydration.  Patient fluids for rhabdomyolysis and dehydration.  Patient 

observed in the emergency Department with improvement of mental status.  He is 

able to provide more history.  Denies any chest pain or shortness of breath.  

Patient states this feels very.  Hungry.  She reports that she fell into a 

basket could not get up.  She states she fell and was on the ground for 2 days. 

She states she did not eat or drink any water since then.  And moving all 

extremities.  She does have an asymmetrical neuro deficits to suggest CVA.  

Patient be admitted with consultation to nephrology.  Patient does have slight 

elevation of troponin however is likely secondary to muscle damage.  She denies 

any chest pain or ACS type symptoms.





EKG interpretation: Ventricular rate 94, normal sinus rhythm,.  Interval 160, QS

50, QTc 420. No MI prolongation, no QTC prolongation, no ST or T-wave changes 

noted.    Overall, this EKG is unremarkable














- Related Data


                                Home Medications











 Medication  Instructions  Recorded  Confirmed


 


Benztropine Mesylate 0.5 mg PO TID 04/20/19 06/03/19


 


Haloperidol [Haldol] 5 mg PO DAILY 04/20/19 06/03/19


 


Amantadine HCl [Amantadine] 100 mg PO DAILY 06/03/19 06/03/19


 


Enalapril [Vasotec] 20 mg PO DAILY 06/03/19 06/03/19


 


Furosemide [Lasix] 20 mg PO DAILY 06/03/19 06/03/19


 


QUEtiapine FUMARATE [SEROquel] 300 mg PO HS 06/03/19 06/03/19


 


amLODIPine [Norvasc] 10 mg PO HS 06/03/19 06/03/19











                                    Allergies











Allergy/AdvReac Type Severity Reaction Status Date / Time


 


No Known Allergies Allergy   Verified 06/03/19 19:31














Review of Systems


ROS Statement: 


Those systems with pertinent positive or pertinent negative responses have been 

documented in the HPI.





ROS Other: All systems not noted in ROS Statement are negative.





Past Medical History


Past Medical History: Hypertension


History of Any Multi-Drug Resistant Organisms: None Reported


Past Surgical History: No Surgical Hx Reported


Past Psychological History: Anxiety, Depression


Smoking Status: Never smoker


Past Alcohol Use History: None Reported


Past Drug Use History: None Reported





- Past Family History


  ** Father


Family Medical History: CVA/TIA, Diabetes Mellitus





General Exam


Limitations: altered mental status





Course


                                   Vital Signs











  06/03/19 06/03/19





  19:08 19:56


 


Temperature 98 F 


 


Pulse Rate 91 79


 


Respiratory 18 18





Rate  


 


Blood Pressure 147/79 126/80


 


O2 Sat by Pulse 96 98





Oximetry  














Medical Decision Making





- Lab Data


Result diagrams: 


                                 06/03/19 19:03





                                 06/03/19 19:51


                                   Lab Results











  06/03/19 06/03/19 06/03/19 Range/Units





  19:03 19:03 19:51 


 


WBC  19.6 H    (3.8-10.6)  k/uL


 


RBC  4.27    (3.80-5.40)  m/uL


 


Hgb  13.7  D    (11.4-16.0)  gm/dL


 


Hct  40.4    (34.0-46.0)  %


 


MCV  94.7    (80.0-100.0)  fL


 


MCH  32.0    (25.0-35.0)  pg


 


MCHC  33.8    (31.0-37.0)  g/dL


 


RDW  14.0    (11.5-15.5)  %


 


Plt Count  223    (150-450)  k/uL


 


Neutrophils %  91    %


 


Lymphocytes %  1    %


 


Monocytes %  6    %


 


Eosinophils %  1    %


 


Basophils %  0    %


 


Neutrophils #  17.9 H    (1.3-7.7)  k/uL


 


Lymphocytes #  0.3 L    (1.0-4.8)  k/uL


 


Monocytes #  1.2 H    (0-1.0)  k/uL


 


Eosinophils #  0.1    (0-0.7)  k/uL


 


Basophils #  0.1    (0-0.2)  k/uL


 


Sodium    145  (137-145)  mmol/L


 


Potassium    4.6  (3.5-5.1)  mmol/L


 


Chloride    111 H  ()  mmol/L


 


Carbon Dioxide    23  (22-30)  mmol/L


 


Anion Gap    11  mmol/L


 


BUN    45 H  (7-17)  mg/dL


 


Creatinine    1.37 H  (0.52-1.04)  mg/dL


 


Est GFR (CKD-EPI)AfAm    44  (>60 ml/min/1.73 sqM)  


 


Est GFR (CKD-EPI)NonAf    38  (>60 ml/min/1.73 sqM)  


 


Glucose    181 H  (74-99)  mg/dL


 


Calcium    9.5  (8.4-10.2)  mg/dL


 


Magnesium    2.2  (1.6-2.3)  mg/dL


 


Total Bilirubin    1.4 H  (0.2-1.3)  mg/dL


 


AST    167 H  (14-36)  U/L


 


ALT    97 H  (9-52)  U/L


 


Alkaline Phosphatase    46  ()  U/L


 


Ammonia   19   (<30)  umol/L


 


Creatine Kinase    2820 H*  ()  U/L


 


Troponin I     (0.000-0.034)  ng/mL


 


Total Protein    7.2  (6.3-8.2)  g/dL


 


Albumin    4.3  (3.5-5.0)  g/dL














  06/03/19 Range/Units





  19:51 


 


WBC   (3.8-10.6)  k/uL


 


RBC   (3.80-5.40)  m/uL


 


Hgb   (11.4-16.0)  gm/dL


 


Hct   (34.0-46.0)  %


 


MCV   (80.0-100.0)  fL


 


MCH   (25.0-35.0)  pg


 


MCHC   (31.0-37.0)  g/dL


 


RDW   (11.5-15.5)  %


 


Plt Count   (150-450)  k/uL


 


Neutrophils %   %


 


Lymphocytes %   %


 


Monocytes %   %


 


Eosinophils %   %


 


Basophils %   %


 


Neutrophils #   (1.3-7.7)  k/uL


 


Lymphocytes #   (1.0-4.8)  k/uL


 


Monocytes #   (0-1.0)  k/uL


 


Eosinophils #   (0-0.7)  k/uL


 


Basophils #   (0-0.2)  k/uL


 


Sodium   (137-145)  mmol/L


 


Potassium   (3.5-5.1)  mmol/L


 


Chloride   ()  mmol/L


 


Carbon Dioxide   (22-30)  mmol/L


 


Anion Gap   mmol/L


 


BUN   (7-17)  mg/dL


 


Creatinine   (0.52-1.04)  mg/dL


 


Est GFR (CKD-EPI)AfAm   (>60 ml/min/1.73 sqM)  


 


Est GFR (CKD-EPI)NonAf   (>60 ml/min/1.73 sqM)  


 


Glucose   (74-99)  mg/dL


 


Calcium   (8.4-10.2)  mg/dL


 


Magnesium   (1.6-2.3)  mg/dL


 


Total Bilirubin   (0.2-1.3)  mg/dL


 


AST   (14-36)  U/L


 


ALT   (9-52)  U/L


 


Alkaline Phosphatase   ()  U/L


 


Ammonia   (<30)  umol/L


 


Creatine Kinase   ()  U/L


 


Troponin I  0.050 H*  (0.000-0.034)  ng/mL


 


Total Protein   (6.3-8.2)  g/dL


 


Albumin   (3.5-5.0)  g/dL














Disposition


Clinical Impression: 


 Weakness, Rhabdomyolysis





Disposition: ADMITTED AS IP TO THIS HOSP


Condition: Fair


Referrals: 


Waqas George MD [Primary Care Provider] - 1-2 days


Decision Time: 20:58

## 2019-06-03 NOTE — CT
EXAMINATION TYPE: CT ChestAbdPelvis w con

 

DATE OF EXAM: 6/3/2019

 

COMPARISON: None

 

HISTORY: trauma, pt found unresponsive

 

CT DLP: 374.4 mGycm

Automated exposure control for dose reduction was used.

 

CONTRAST: 

CT scan of the chest, abdomen and pelvis is performed without Oral Contrast and with IV Contrast, pat
ient injected with 100 mL of Isovue 300.

 

FINDINGS:

 

The lungs are clear of infiltrate. There is no evidence of pulmonary mass. Heart size is normal. Ther
e is no pericardial effusion. 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 There is no sign of diss
ection. There is no mediastinal adenopathy. There are no hilar masses.

 

There is no pleural effusion. Liver spleen stomach appear normal. Bile ducts are not dilated. There i
s no sign of pancreatic mass. Gallbladder appears normal.

 

There is no adrenal mass. Kidneys show satisfactory contrast opacification. There is no hydronephrosi
s. Abdominal aorta is atheromatous. There is no retroperitoneal adenopathy. There is retained fecal m
aterial throughout the large bowel. Rectum is enlarged and measures 7 cm. Bladder distends smoothly. 
There is no free fluid in the pelvis. There is no inguinal hernia.

 

There is no mesenteric edema. There is no sign of free air. There is no ascites. Appendix is not seen
. Thoracic and lumbar spine appear intact. Bony pelvis is intact.

IMPRESSION: Constipation. Rectal fecal impaction.

 

No sign of acute traumatic injury of the chest abdomen pelvis. 4 cm aneurysm of the ascending aorta u
nchanged compared to chest CT scan 4/21/2019.

## 2019-06-03 NOTE — CT
EXAMINATION TYPE: CT brain audrey mahan con

 

DATE OF EXAM: 6/3/2019

 

COMPARISON: CT brain April 20, 2019.

 

HISTORY: trauma, pt found unresponsive

 

CT DLP: 1392.1 mGycm

Automated exposure control for dose reduction was used.

 

TECHNIQUE: CT scan of the head and cervical spine are performed without contrast.

 

FINDINGS:   There is cerebral cortical atrophy. There is no mass effect nor midline shift. There is n
o sign of intracranial hemorrhage. The calvarium is intact.

 

Cervical vertebra have normal alignment. There is degenerative hypertrophic spurring anteriorly from 
C4 to C7 with spurring of the endplates. I see no significant narrowing of the spinal canal. There is
 multilevel hypertrophic facet arthropathy. The skull base is intact.

 

IMPRESSION:

Cerebral atrophy. No acute intracranial abnormality. No change.

 

Spondylotic changes in the mid and lower cervical spine. No fracture.

## 2019-06-04 RX ADMIN — BENZTROPINE MESYLATE SCH MG: 0.5 TABLET ORAL at 16:24

## 2019-06-04 RX ADMIN — CEFAZOLIN SCH MLS/HR: 330 INJECTION, POWDER, FOR SOLUTION INTRAMUSCULAR; INTRAVENOUS at 16:25

## 2019-06-04 RX ADMIN — AMANTADINE HYDROCHLORIDE SCH MG: 100 CAPSULE, LIQUID FILLED ORAL at 08:55

## 2019-06-04 RX ADMIN — FUROSEMIDE SCH MG: 20 TABLET ORAL at 08:55

## 2019-06-04 RX ADMIN — BENZTROPINE MESYLATE SCH MG: 0.5 TABLET ORAL at 20:40

## 2019-06-04 RX ADMIN — PANTOPRAZOLE SODIUM SCH MG: 40 TABLET, DELAYED RELEASE ORAL at 06:00

## 2019-06-04 RX ADMIN — HALOPERIDOL SCH MG: 5 TABLET ORAL at 08:56

## 2019-06-04 RX ADMIN — BENZTROPINE MESYLATE SCH MG: 0.5 TABLET ORAL at 08:55

## 2019-06-04 RX ADMIN — CEFAZOLIN SCH MLS/HR: 330 INJECTION, POWDER, FOR SOLUTION INTRAMUSCULAR; INTRAVENOUS at 23:42

## 2019-06-04 RX ADMIN — LISINOPRIL SCH MG: 20 TABLET ORAL at 08:55

## 2019-06-04 NOTE — XR
EXAM:

  XR Left Elbow Complete, 3 or More Views

 

CLINICAL HISTORY:

  ITS.REASON XR Reason: pain

 

TECHNIQUE:

  Frontal, lateral and oblique views of the left elbow.

 

COMPARISON:

  No relevant prior studies available.

 

FINDINGS:

  Bones/joints:  Unremarkable.  No acute fracture.  No dislocation.

  Soft tissues:  Unremarkable.

 

IMPRESSION:     

  Normal left elbow x-rays.

## 2019-06-04 NOTE — XR
EXAMINATION TYPE: XR lumbosacral spine min 4V

 

DATE OF EXAM: 6/4/2019

 

CLINICAL HISTORY: Back pain

 

TECHNIQUE: Frontal, lateral, and oblique images of the lumbar spine are obtained.

 

COMPARISON: None

 

FINDINGS: There is a dextroscoliosis of the lumbar spine. There is slight bowing of the mid endplate 
of L4, likely on the basis of degenerative disc disease.  There are 6 lumbar type vertebral bodies id
entified.  The lumbar spine shows satisfactory alignment without evidence of acute fracture or disloc
ation. Vertebral body heights are within normal limits.   The oblique images appear within normal ricks
its.  There is minimal intervertebral disc space narrowing at multiple levels and small anterior oste
ophytes as well as mild facet arthropathy. There is diffuse osseous demineralization noted.

 

IMPRESSION:

1. No compression deformity is seen although slight bowing of the mid endplate of L4 is present and l
ikely related to degenerative disc disease.

2. Moderate multilevel degenerative disc disease, dextroscoliosis of the lumbar spine, and diffuse os
seous demineralization.

## 2019-06-04 NOTE — HP
HISTORY AND PHYSICAL



DATE OF ADMISSION:

06/03/2019.



DATE OF DICTATION:

06/04/2019



She is admitted on telemetry floor, monitored bed.



AGE:

74 years old.



The patient is seen today, evaluated and she came to the facility after they 
called the

ambulance who brought her after they found her at the side of the bed and fell 
down.

So, they admitted.  Subsequently seen in the ER. She has past medical history of

bipolar depression, anxiety, and chronic hypertension and venous stasis of the 
right

lower extremity, which was treated.  The patient has a history of recurrent fall
and

she has also stature wise she is bending over and with the difficulty of 
ambulation.

The patient is very hard to obtain history from her with the underlying tardive

dyskinesia and lip masking and the speech disturbance due to her previous 
antipsychotic

medication and the extrapyramidal effect.  As patient admitted to the hospital 
and they

did a CT scan which showed only brain atrophy.  She has abrasions and speech 
slow and

tardive.  She was obtunded, but dehydrated initiated and she apparently was not 
eating

where she has been as dependent.  In the emergency room, they obtained a CT scan
of the

head was negative for stroke and she has cerebral atrophy and she has 
constipation.

She has a 4 cm aneurysm in the abdominal aorta.  At that time, she has edema 2+ 
and she

had the chin tear and she has also tear on the side of the abdomen and that 
appeared at

the time of the admission and admission  also, her vital sign was indicating

temperature 97.5, and heart was 71, regular, and oxygen saturation 99%.  Her 
blood

pressure 126/74 with respiratory rate was 16.  At that time also she had 
laboratory

indicating that BUN of 45 and creatinine 1.37 and her white count was elevated 
at 19.6.

She was taking diuretics and diuretics probably make her more dehydrated.  The 
patient

is difficult to comply with the medication and to adjust her medication as well 
and her

chest x-ray was negative and the lung was clear.  She had history of lip 
masking.



PAST MEDICAL HISTORY:

She had past medical history of atrial fibrillation, GERD, hypertension.  She 
had

increased lip masking.



SOCIAL HISTORY:

She is single, never , and her brother is the caregiver, Mr. Eric Persaud.

She lives alone and she never smoked.  No drink.



Her psychiatrist, Dr. Riley, who does not come to the hospital and usually he is
in the

outpatient.



ALLERGY:

Unknown.



She had history of recent admission to the [facility] followed by with 
a

similar episode with fall and she is initiated and she is not eating.  She used 
to be

241 pounds and now is around 115 pounds.  Difficult to communicate.  Difficult 
to be

with people and difficult to attend any group feeding and we did not have any 
way to

send her to psychiatric hospital and in the last time the psychiatrist thought 
that she

is fine.  The patient is progressively worsening and we will be consulting the

Psychiatry.



REVIEW OF SYSTEMS:

The patient had a fall and an abrasion on the chin and several scratches as well
as the

abdomen and she is bent over when she walks.  I am not clear if she has any 
spine

problem and we will be obtaining a spinal total spine x-ray, thoracolumbar and 
sacral

and cervical.



On the review of systems, the patient did not verbalize that she has any pain 
and she

had no chest pain.  No abdominal pain, but questionable constipation that was

considered.  The patient did not have any testing for that.  However, we will 
wait

until we see if the patient eating or not.



PHYSICAL EXAMINATION:

VITAL SIGNS: The vital signs stable and at this time and we will be adjusting 
her

medication.  Currently, temperature 98.6, pulse 70 and pulse ox 98 percent.  
Blood

pressure 144/69 and respiratory rate was 16.  We will be planning for checking 
her BMP

in the morning as well as the CBC with differential.



On examination, the patient is conscious, alert, but unable to express herself 
clearly

and she had the speech impediment that associated with the lip masking and 
tardive

dyskinesia, and she had natural teeth and pupil was equal, reactive.  Hearing 
was

normal.  Neck was supple.  No JVD.  No thyromegaly.  No lymphadenopathy.  
Trachea

midline.  The chest was clear and loss of the intercostal muscle and loss of the
chest

muscle mass, and she appeared to be very emaciated, dehydrated.  The lung was 
clear,

however, with normal breath sounds.  No wheezes.  Heart PMI in the 5th 
intercostal

space outside midclavicular line with normal S1, S2.  No gallop.  The abdomen 
was soft,

nontender.  Positive bowel sounds.

EXTREMITIES:  No edema.  She had history of right leg venous ulcer was treated 
in the

past by Dr. Mathews with [place].  The pulses intact.  She had onychomycosis 
of the

toenail bilaterally.

Neurologically, she has very difficulty of left hand movement and she can move 
her feet

bilaterally.  No neuro deficit.  However[facility] but we have a psychiatrist which we did 
consult

the psychiatrist to evaluate the patient.



Assessment for recurrent falling attack. Underlying dehydration and chronic 
kidney

disease stage 3. We will be checking her UA culture and sensitivity if the 
initial one

was negative, we will wait.  Meanwhile we are going to get x-ray of the spine, 
thoracic

and the lumbosacral to rule out any compression fracture.  However, patient is 
not

symptomatic with pain, but the changing of skeletal m .andRabdomyalysis and she 
is now nutritional

deficit and loss of muscle mass.  The computer is out of order and we could not 
get the

data  Her white count is 19.6 1000 and her CK is 2820 and troponin is 0.050.

Underlying rhabdomyolysis.



ASSESSMENT:

1. Major depression, bipolar and self neglect.

2. Rhabdomyolysis with frequent falls.

3. Underlying history of constipation and aneurysm.

4. Her CK is 2820 and a troponin 0.050 and repeat today the rhabdo is 4941 and 
her

    bilirubin also was elevated 1.4, and  and ALT 97, and her urinalysis 
was

    indicating that they have cloudy and the specific gravity 1.050 and moderate
blood

    as well.  Her carbon dioxide 23, and we will be planning for increasing IV 
fluid to

    125 mL/hour.  0.9 normal saline and at this time starting her on antibiotic 
and

    probably Rocephin.

5. With the underlying added diagnosis urinary tract infection and 
rhabdomyolysis,

    chronic kidney disease stage 3 and hyperglycemia which her blood sugar was 
181.

    Her creatinine was 1.37 and the BUN is 45.

6-frequent FALL

7-tardive Dyskinesia , extrapyramidal manifestation ,Tardivedyskinesia.

plan:

1- [id number] with increase IV N.saline ageressivly

2-UTI IV antibiotic

3-CONSULT psychiatry for evaluation and change current tx.

4-consult neph.

5-consult neurology,not available

6- monitor CPK

7-H risk fall   

8-x-ray of spine L-S ,dorsal ,duoto statur bendover forward Gait .which impair 
balance.



MMODL / IJN: [id number] / Job#: [id number]

[facility]

## 2019-06-04 NOTE — P.NPCON
History of Present Illness





- Reason for Consult


acute renal failure





- History of Present Illness





Reason for consultation: Acute kidney injury





History of present illness:


Patient is a 73-year-old female seen in consultation for acute kidney injury and

rhabdomyolysis.  Patient's baseline creatinine is 1.  It was elevated at 1.37 on

admission yesterday.  Patient is not a reliable historian.  Apparently the 

patient was found down on the floor at her assisted living facility and was 

subsequently sent to the hospital.  Her CK level was elevated at 2820.  CT of 

the abdomen and pelvis with contrast was done which revealed no evidence of 

hydronephrosis.  No fractures were noted.  No acute changes were noted on CT of 

the brain.  Hemodynamic patient is stable.  She is maintained on normal saline 

at 100 mL an hour.  She is also on lisinopril for blood pressure control.  She 

is also on Lasix 20 mg daily.  Blood pressure this morning was 126/74.  No 

history of diabetes.  I don't see any nephrotoxins and her home medications.





Vital signs are stable.


General: The patient appeared well nourished and normally developed. 


HEENT: Head exam is unremarkable. Neck is without jugular venous distension.


LUNGS: Lungs are clear to auscultation and percussion. Breath sounds decreased.


HEART: Rate and Rhythm are regular. First and second heart sounds normal. No 

murmurs, rubs or gallops. 


ABDOMEN: Abdominal exam reveals normal bowel sounds. Non-tender and non-

distended. No evidence of peritonitis.


EXTREMITITES: No clubbing, cyanosis, or edema.





Past Medical History


Past Medical History: Hypertension


History of Any Multi-Drug Resistant Organisms: None Reported


Past Surgical History: No Surgical Hx Reported


Smoking Status: Never smoker





- Past Family History


  ** Father


Family Medical History: CVA/TIA, Diabetes Mellitus





Medications and Allergies


                                Home Medications











 Medication  Instructions  Recorded  Confirmed  Type


 


Benztropine Mesylate 0.5 mg PO TID 04/20/19 06/03/19 History


 


Haloperidol [Haldol] 5 mg PO DAILY 04/20/19 06/03/19 History


 


Amantadine HCl [Amantadine] 100 mg PO DAILY 06/03/19 06/03/19 History


 


Enalapril [Vasotec] 20 mg PO DAILY 06/03/19 06/03/19 History


 


Furosemide [Lasix] 20 mg PO DAILY 06/03/19 06/03/19 History


 


QUEtiapine FUMARATE [SEROquel] 300 mg PO HS 06/03/19 06/03/19 History


 


amLODIPine [Norvasc] 10 mg PO HS 06/03/19 06/03/19 History








                                    Allergies











Allergy/AdvReac Type Severity Reaction Status Date / Time


 


No Known Allergies Allergy   Verified 06/03/19 19:31














Physical Exam


Vitals: 


                                   Vital Signs











  Temp Pulse Pulse Resp BP BP Pulse Ox


 


 06/04/19 08:00  97.5 F L   71  16   126/74  99


 


 06/04/19 03:57       162/75 


 


 06/04/19 02:34  98 F   69  18   168/79  96


 


 06/04/19 02:10  98.6 F  72   18  151/76   98


 


 06/03/19 22:13   76   18  160/92   98


 


 06/03/19 19:56   79   18  126/80   98


 


 06/03/19 19:08  98 F  91   18  147/79   96








                                Intake and Output











 06/03/19 06/04/19 06/04/19





 22:59 06:59 14:59


 


Intake Total   0


 


Balance   0


 


Intake:   


 


  Oral   0


 


Other:   


 


  Voiding Method   Diaper





   Incontinent


 


  # Voids  1 


 


  Weight 49.3 kg 52 kg 














Results





- Lab Results


                             Most recent lab results











Calcium  9.5 mg/dL (8.4-10.2)   06/03/19  19:51    


 


Magnesium  2.2 mg/dL (1.6-2.3)   06/03/19  19:51    














                                 06/03/19 19:03





                                 06/03/19 19:51





Assessment and Plan


Plan: 





Assessment:


1.  Acute kidney injury mostly prerenal secondary to diuretics.  Possibly mild 

component of rhabdomyolysis.  Creatinine 1.37 on admission.  Baseline creatinine

 near 0.6.


2.  Mild rhabdomyolysis.  CK level 2820 on admission.  She does have moderate 

blood on her UA and 1 RBC which is suggestive of myoglobinuria.


3.  Status post fall.  No acute fractures noted.


4.  Benign hypertension.  Controlled.





Plan:


Maintain normal saline at 100 mL an hour.


Hold Lasix.


Hold lisinopril if systolic blood pressure less than 120.


Repeat CK level today.


Continue to monitor renal function and urine output.





Thank you for the consultation.  I will continue to follow the patient with you 

during her hospital stay.

## 2019-06-05 LAB
ANION GAP SERPL CALC-SCNC: 2 MMOL/L
BUN SERPL-SCNC: 24 MG/DL (ref 7–17)
CALCIUM SPEC-MCNC: 8.1 MG/DL (ref 8.4–10.2)
CHLORIDE SERPL-SCNC: 113 MMOL/L (ref 98–107)
CK SERPL-CCNC: 2706 U/L (ref 30–135)
CO2 SERPL-SCNC: 27 MMOL/L (ref 22–30)
ERYTHROCYTE [DISTWIDTH] IN BLOOD BY AUTOMATED COUNT: 3.26 M/UL (ref 3.8–5.4)
ERYTHROCYTE [DISTWIDTH] IN BLOOD: 12.9 % (ref 11.5–15.5)
GLUCOSE SERPL-MCNC: 91 MG/DL (ref 74–99)
HCT VFR BLD AUTO: 31.7 % (ref 34–46)
HGB BLD-MCNC: 10.2 GM/DL (ref 11.4–16)
MAGNESIUM SPEC-SCNC: 2 MG/DL (ref 1.6–2.3)
MCH RBC QN AUTO: 31.3 PG (ref 25–35)
MCHC RBC AUTO-ENTMCNC: 32.2 G/DL (ref 31–37)
MCV RBC AUTO: 97.2 FL (ref 80–100)
PLATELET # BLD AUTO: 136 K/UL (ref 150–450)
POTASSIUM SERPL-SCNC: 3.9 MMOL/L (ref 3.5–5.1)
SODIUM SERPL-SCNC: 142 MMOL/L (ref 137–145)
WBC # BLD AUTO: 9.3 K/UL (ref 3.8–10.6)

## 2019-06-05 RX ADMIN — AMANTADINE HYDROCHLORIDE SCH MG: 100 CAPSULE, LIQUID FILLED ORAL at 21:01

## 2019-06-05 RX ADMIN — FUROSEMIDE SCH: 20 TABLET ORAL at 11:09

## 2019-06-05 RX ADMIN — CEFAZOLIN SCH: 330 INJECTION, POWDER, FOR SOLUTION INTRAMUSCULAR; INTRAVENOUS at 10:24

## 2019-06-05 RX ADMIN — AMANTADINE HYDROCHLORIDE SCH MG: 100 CAPSULE, LIQUID FILLED ORAL at 10:30

## 2019-06-05 RX ADMIN — HALOPERIDOL SCH MG: 5 TABLET ORAL at 11:06

## 2019-06-05 RX ADMIN — BENZTROPINE MESYLATE SCH MG: 0.5 TABLET ORAL at 16:40

## 2019-06-05 RX ADMIN — LISINOPRIL SCH: 20 TABLET ORAL at 11:09

## 2019-06-05 RX ADMIN — BENZTROPINE MESYLATE SCH MG: 0.5 TABLET ORAL at 21:04

## 2019-06-05 RX ADMIN — CEFAZOLIN SCH MLS/HR: 330 INJECTION, POWDER, FOR SOLUTION INTRAMUSCULAR; INTRAVENOUS at 16:39

## 2019-06-05 RX ADMIN — BENZTROPINE MESYLATE SCH MG: 0.5 TABLET ORAL at 10:29

## 2019-06-05 RX ADMIN — PANTOPRAZOLE SODIUM SCH: 40 TABLET, DELAYED RELEASE ORAL at 06:51

## 2019-06-05 NOTE — PN
PROGRESS NOTE



Patient is seen for followup for acute kidney injury.  She is currently doing better.

Creatinine is down to 0.76 mg/dL.



On examination this morning, blood pressure was 113/68, heart rate of about 70 per

minute.  Patient is afebrile.

EXAMINATION OF THE HEART: S1 and S2.

EXAMINATION OF LUNGS: Bilateral breath sounds are heard.

ABDOMEN:  Soft, non-tender.

Examination of lower extremities shows no significant edema.

CNS exam is grossly intact.



Labs show sodium 142, potassium 3.9, BUN 24, serum creatinine 0.76, hemoglobin 10.2

g/dL.



ASSESSMENT:

1. Acute kidney injury, acute tubular necrosis, currently improved.

2. Rhabdomyolysis, improving.

3. Status post fall.



PLAN:

Encourage increased oral intake.  Decrease IV fluids.  May continue with ACE

inhibitors.





MMODL / IJN: [id number] / Job#: [id number]

## 2019-06-05 NOTE — XR
EXAMINATION TYPE: XR wrist complete LT

 

DATE OF EXAM: 6/5/2019

 

COMPARISON: NONE

 

HISTORY: Wrist pain

 

TECHNIQUE: 4 views

 

FINDINGS: Carpal bones appear intact. I see no fracture nor dislocation. There are no erosions. There
 is minor spur formation.

 

IMPRESSION: Minimal osteoarthritis. No fracture seen.

## 2019-06-05 NOTE — P.CN
Psychiatric Consult





- .


Consult date: 06/05/19


Consult:: 





06/05/19 15:12


Identification: Patient is a 73-year-old female who was found on the floor of 

her apartment in assisted living with an altered mental status and birth brought

to the emergency room.





Reason for Consult: Depression history of bipolar disorder





History of Present Illness: Patient's chart was reviewed, the patient was 

discussed with Dr. Riley, her current treating psychiatrist, as well as her 

primary care physician and the patient was seen and interviewed in her room no 

family members were present.  Patient states that she got dizzy and fell and 

doesn't remember much after that.  She states that she lives in an assisted 

living facility and does not cook for herself.  Patient states that she doesn't 

like the Meals on Wheels and has been losing weight.  Patient could not tell me 

what occurred once she fell.  Patient is now reporting that her left hand is 

drooping at the wrist and she is unable to use it and needs to support it with 

her right hand.  Patient states that she does not have any aides who come in to 

assist her at the assisted living facility.


Speaking with Dr. Riley the patient has a history of schizophrenia as well as 

tardive dyskinesia and she has been maintained on the outside on Haldol 5 mg da

rahel, Seroquel 300 mg at bedtime and amantadine 100 mg twice a day to control her

symptoms.  Patient was recently in the psychiatric unit and then was recently 

also on the medical floor for another fall at her assisted living facility.  

Patient states that she is not currently hearing voices, reported no suicidal 

ideation and was more concerned about they inability to use her left hand 

stating that she can't feed herself and so needs to be placed in a nursing home 

when she is discharged.





Past Psychiatric History: patient states that she's been treated for most of her

adult life, she has had a recent psychiatric admission.  Her most recent 

psychiatric meds are as stated above





Past Medical/Surgical History: patient states she is being treated for 

hypertension and has no surgical history.





Social History: Patient has never been , has no children and states that 

she only has 1 brother.  She's been living in an assisted living facility and 

states that she has no caregivers or other aides.  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  Patient's 

brother is her legal guardian.





Substance Use History: patient denies any alcohol or drug use history currently 

or in the past





Mental status: Appearance/Attitude: Patient is sitting in a hospital bed in no 

acute distress, she is holding her left wrist with her right hand, she made 

intermittent eye contact and was cooperative


Behavior: Patient did not exhibit any psychomotor agitation or retardation, 

involuntary movement of her mouth and face were noted during the exam


Speech/Language: Patient's speech was spontaneous at times she stuttered, she 

spoke in a normal volume and was coherent


Thought Process: Patient was goal-directed in her responses there is no evidence

of loose associations or flight of ideas


Thought Content: Patient denied that she was hearing voices or having visual 

hallucinations and no delusions were elicited.  Patient was concerned about her 

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 

concerned that she couldn't cut meat or feed herself.  Patient stated that she 

had not been eating well at home


Suicidal/Homicidal Ideation: Patient denied any current suicidal or homicidal 

ideation


Sensorium/Cognition: Patient was alert and oriented to person and location and 

situation further cognitive testing was not performed


Mood/Affect: Patient's mood was pleasant although slightly anxious and her 

affect was appropriate to her mood


Insight/Judgment: Patient's insight and judgment are limited





Assessment: I spoke with Dr. Riley prior to seeing the patient and he expressed 

concerns that the patient lost an extraordinary amount of weight recently, in 

speaking with her primary care physician he stated that the patient's prior 

weight was 220 pounds and that she currently weighs 115 pounds.  Patient is 

living in assisted living without any assistance and does not eat her meals on 

wheels that arrived she stated to me that it was because they arrived at noon 

and she didn't feel like eating and later in the evening.  Patient has a history

of schizophrenia and has been treated for a number of years and does have 

evidence of tardive dyskinesia.  Patient was currently admitted and per her 

primary care physician she also had a urinary tract infection on admission.  

Patient sustained an abrasion on her left side as well as having evidence of 

rhabdomyolysis on laboratory studies.  Patient herself stated that she did not 

think she could return to the assisted living facility and per her primary care 

placement when she was at a nursing home in the past she did well eating gaining

weight.


                             Laboratory Last Values











WBC  9.3 k/uL (3.8-10.6)   06/05/19  06:19    


 


RBC  3.26 m/uL (3.80-5.40)  L  06/05/19  06:19    


 


Hgb  10.2 gm/dL (11.4-16.0)  L D 06/05/19  06:19    


 


Hct  31.7 % (34.0-46.0)  L  06/05/19  06:19    


 


MCV  97.2 fL (80.0-100.0)   06/05/19  06:19    


 


MCH  31.3 pg (25.0-35.0)   06/05/19  06:19    


 


MCHC  32.2 g/dL (31.0-37.0)   06/05/19 06:19    


 


RDW  12.9 % (11.5-15.5)   06/05/19  06:19    


 


Plt Count  136 k/uL (150-450)  L  06/05/19  06:19    


 


Neutrophils %  91 %  06/03/19  19:03    


 


Lymphocytes %  1 %  06/03/19  19:03    


 


Monocytes %  6 %  06/03/19  19:03    


 


Eosinophils %  1 %  06/03/19  19:03    


 


Basophils %  0 %  06/03/19  19:03    


 


Neutrophils #  17.9 k/uL (1.3-7.7)  H  06/03/19  19:03    


 


Lymphocytes #  0.3 k/uL (1.0-4.8)  L  06/03/19  19:03    


 


Monocytes #  1.2 k/uL (0-1.0)  H  06/03/19  19:03    


 


Eosinophils #  0.1 k/uL (0-0.7)   06/03/19  19:03    


 


Basophils #  0.1 k/uL (0-0.2)   06/03/19  19:03    


 


PT  10.4 sec (9.0-12.0)   06/03/19  19:03    


 


INR  1.0  (<1.2)   06/03/19  19:03    


 


APTT  20.2 sec (22.0-30.0)  L  06/03/19  19:03    


 


Sodium  142 mmol/L (137-145)   06/05/19  06:19    


 


Potassium  3.9 mmol/L (3.5-5.1)   06/05/19  06:19    


 


Chloride  113 mmol/L ()  H  06/05/19  06:19    


 


Carbon Dioxide  27 mmol/L (22-30)   06/05/19  06:19    


 


Anion Gap  2 mmol/L  06/05/19  06:19    


 


BUN  24 mg/dL (7-17)  H  06/05/19  06:19    


 


Creatinine  0.76 mg/dL (0.52-1.04)   06/05/19  06:19    


 


Est GFR (CKD-EPI)AfAm  >90  (>60 ml/min/1.73 sqM)   06/05/19  06:19    


 


Est GFR (CKD-EPI)NonAf  79  (>60 ml/min/1.73 sqM)   06/05/19  06:19    


 


Glucose  91 mg/dL (74-99)   06/05/19  06:19    


 


Calcium  8.1 mg/dL (8.4-10.2)  L  06/05/19  06:19    


 


Magnesium  2.0 mg/dL (1.6-2.3)   06/05/19  06:19    


 


Total Bilirubin  1.4 mg/dL (0.2-1.3)  H  06/03/19  19:51    


 


AST  167 U/L (14-36)  H  06/03/19  19:51    


 


ALT  97 U/L (9-52)  H  06/03/19  19:51    


 


Alkaline Phosphatase  46 U/L ()   06/03/19  19:51    


 


Ammonia  19 umol/L (<30)   06/03/19  19:03    


 


Creatine Kinase  2706 U/L ()  H*  06/05/19  06:19    


 


Troponin I  0.050 ng/mL (0.000-0.034)  H*  06/03/19  19:51    


 


Total Protein  7.2 g/dL (6.3-8.2)   06/03/19  19:51    


 


Albumin  4.3 g/dL (3.5-5.0)   06/03/19  19:51    


 


Urine Color  Yellow   06/03/19  21:15    


 


Urine Appearance  Cloudy  (Clear)  H  06/03/19  21:15    


 


Urine pH  5.5  (5.0-8.0)   06/03/19  21:15    


 


Ur Specific Gravity  1.050  (1.001-1.035)  H  06/03/19  21:15    


 


Urine Protein  1+  (Negative)  H  06/03/19  21:15    


 


Urine Glucose (UA)  Negative  (Negative)   06/03/19  21:15    


 


Urine Ketones  Negative  (Negative)   06/03/19  21:15    


 


Urine Blood  Moderate  (Negative)  H  06/03/19  21:15    


 


Urine Nitrite  Negative  (Negative)   06/03/19  21:15    


 


Urine Bilirubin  Negative  (Negative)   06/03/19  21:15    


 


Urine Urobilinogen  <2.0 mg/dL (<2.0)   06/03/19  21:15    


 


Ur Leukocyte Esterase  Negative  (Negative)   06/03/19  21:15    


 


Urine RBC  1 /hpf (0-5)   06/03/19  21:15    


 


Urine WBC  6 /hpf (0-5)  H  06/03/19  21:15    


 


Amorphous Sediment  Occasional /hpf (None)  H  06/03/19  21:15    


 


Hyaline Casts  36 /lpf (0-2)  H  06/03/19  21:15    


 


Granular Casts  10 /lpf (0)   06/03/19  21:15    


 


Urine Mucus  Few /hpf (None)  H  06/03/19  21:15    

















Diagnosis: schizophrenia





Plan: patient should continue on Haldol 5 mg daily, Seroquel 300 mg at bedtime 

and I will increase the amantadine to 100 mg twice a day to target her tardive 

dyskinesia per Dr. Riley's recommendation.  Patient is currently stable on these

medications and I see no need to adjust the Haldol or Seroquel at this time.  I 

spoke with social work regarding Dr. Riley's recommendation that the patient 

needs to be in a supervised living situation on discharge and her primary care 

physician has also discussed with the patient that she will be going to a 

nursing home at the time of discharge.  I spoke with the  regarding

speaking with her brother who is her legal guardian regarding these 

recommendations due to the patient's recent 2 falls as well as her weight loss 

of about 100 pounds over the last year or so.  There are any further questions 

or concerns please don't hesitate to contact me I will sign off the case.


06/05/19 15:13





06/05/19 15:24

## 2019-06-05 NOTE — P.PN
Subjective





Underlying progress note date of service 06/05/2019.


Patient has anemia and we will check iron study.


Rhabdomyolysis, CPK still elevated in the 20,000 range.  Patient continued on 

hydration.


Vitamin D3 lab ordered, patient has mild hypocalcemia with the decreased intake.


Severe weight loss.


Seen by the psychiatrist who I did talk to her today and the psychiatrist did 

spoke with Dr. Saud Riley and she advised to increase amantadine to twice a 

day 100 mg.


The left hand drop and the patient appeared to be worried about it and patient 

also fell down and we will be checking growth the x-ray to rule out fracture.


I did speak with the dietitian specialist for improving her diet with the 

significant weight loss.


Psychiatrist recommended supervision and nursing home placement.


Left side abdominal laceration treated with Silvadene cream and dressing applied

once a day.





On examination patient is conscious alert now and able to communicate however 

she had the tardive dyskinesia and difficulty SPEECH, she has however normal 

computed tomography scan of the brain.


She x-ray of the dorsal and lumbosacral spine with the advance it degenerative 

arthritis as well as facet arthropathy.


HEENT was negative neck was supple and no JVD no thyromegaly no lymphadenopathy 

trachea midline.


Chest clear to auscultation and percussion.


Heart regular sinus rhythm.


Abdomen soft positive bowel sounds with the maceration on the left side of the 

skin of the side.


Extremities no edema and positive pulses history of the right leg venous ulcer 

in the past currently healed.


Neurologically: Stable


Psychiatry seen today by the psychiatrist M.D. or indicate that she needs 

supervision continuing her medication increasing the amantadine to 100 mg twice 

a day and the need for nursing home.





Assessment: And plan.


Continue the current medication including IV and repeating the CPK to see if any

continuously improved or not.


Plan for nursing home placement.  Continuous supervision especially her left 

hand drop and she is right-handed


Continue nutritional support.


History of leukocytosis in the urine with the borderline UTI on antibiotic as 

well











Objective





- Vital Signs


Vital signs: 


                                   Vital Signs











Temp  97.6 F   06/05/19 08:25


 


Pulse  68   06/05/19 08:25


 


Resp  16   06/05/19 08:25


 


BP  113/68   06/05/19 08:25


 


Pulse Ox  99   06/05/19 08:25








                                 Intake & Output











 06/04/19 06/05/19 06/05/19





 18:59 06:59 18:59


 


Intake Total 1040 800 722


 


Output Total  800 


 


Balance 1040 0 722


 


Weight  51.9 kg 51.9 kg


 


Intake:   


 


  Intake, IV Titration 800 800 





  Amount   


 


    Sodium Chloride 0.9% 1, 800 750 





    000 ml @ 125 mls/hr IV .   





    Q8H JARET Rx#:[id number]   


 


    cefTRIAXone 1 gm In  50 





    Sodium Chloride 0.9% 50   





    ml @ 100 mls/hr IVPB   





    Q12[facility] Rx#:[id number]   


 


  Oral 240  722


 


Output:   


 


  Urine  800 


 


Other:   


 


  Voiding Method Diaper Bedside Commode 





 Incontinent  


 


  # Voids 2 1 














- Labs


CBC & Chem 7: 


                                 06/05/19 06:19





                                 06/05/19 06:19


Labs: 


                  Abnormal Lab Results - Last 24 Hours (Table)











  06/05/19 06/05/19 Range/Units





  06:19 06:19 


 


RBC   3.26 L  (3.80-5.40)  m/uL


 


Hgb   10.2 L D  (11.4-16.0)  gm/dL


 


Hct   31.7 L  (34.0-46.0)  %


 


Plt Count   136 L  (150-450)  k/uL


 


Chloride  113 H   ()  mmol/L


 


BUN  24 H   (7-17)  mg/dL


 


Calcium  8.1 L   (8.4-10.2)  mg/dL


 


Creatine Kinase  2706 H*   ()  U/L








                      Microbiology - Last 24 Hours (Table)











 06/04/19 02:03 Blood Culture - Preliminary





 Blood    No Growth after 24 hours


 


 06/03/19 21:15 Urine Culture - Final





 Urine,Catheterized

## 2019-06-05 NOTE — P.PN
Subjective


Progress Note Date: 06/05/19 (Rhabdomyolysis, left wrist drop, frequent falls)


Principal diagnosis: 





#1 rhabdomyolysis with elevated CPK.


#2 superficial skin laceration on the left side of the abdomen secondary to 

fall.


#3 schizophrenia.


#4 significant weight loss, weight initially was 241 pounds currently 115 pounds

in the underlying psychotic changes indicating not eating.  Patient gets Meals 

on Wheels and her apartment but she presented in the refrigerator and then 2 

units.


#5 left wrist drop with inability to use it.  Rule out fracture, x-ray was 

ordered.


#6 hypertension controlled.





Objective





- Vital Signs


Vital signs: 


                                   Vital Signs











Temp  97.6 F   06/05/19 08:25


 


Pulse  68   06/05/19 08:25


 


Resp  16   06/05/19 08:25


 


BP  113/68   06/05/19 08:25


 


Pulse Ox  99   06/05/19 08:25








                                 Intake & Output











 06/04/19 06/05/19 06/05/19





 18:59 06:59 18:59


 


Intake Total 1040 800 722


 


Output Total  800 


 


Balance 1040 0 722


 


Weight  51.9 kg 


 


Intake:   


 


  Intake, IV Titration 800 800 





  Amount   


 


    Sodium Chloride 0.9% 1, 800 750 





    000 ml @ 125 mls/hr IV .   





    Q8H JARET Rx#:[id number]   


 


    cefTRIAXone 1 gm In  50 





    Sodium Chloride 0.9% 50   





    ml @ 100 mls/hr IVPB   





    Q12HR JARET Rx#:[id number]   


 


  Oral 240  722


 


Output:   


 


  Urine  800 


 


Other:   


 


  Voiding Method Diaper Bedside Commode 





 Incontinent  


 


  # Voids 2 1 














- Labs


CBC & Chem 7: 


                                 06/05/19 06:19





                                 06/05/19 06:19


Labs: 


                  Abnormal Lab Results - Last 24 Hours (Table)











  06/05/19 06/05/19 Range/Units





  06:19 06:19 


 


RBC   3.26 L  (3.80-5.40)  m/uL


 


Hgb   10.2 L D  (11.4-16.0)  gm/dL


 


Hct   31.7 L  (34.0-46.0)  %


 


Plt Count   136 L  (150-450)  k/uL


 


Chloride  113 H   ()  mmol/L


 


BUN  24 H   (7-17)  mg/dL


 


Calcium  8.1 L   (8.4-10.2)  mg/dL


 


Creatine Kinase  2706 H*   ()  U/L








                      Microbiology - Last 24 Hours (Table)











 06/04/19 02:03 Blood Culture - Preliminary





 Blood    No Growth after 24 hours


 


 06/03/19 21:15 Urine Culture - Final





 Urine,Catheterized

## 2019-06-06 LAB
ANION GAP SERPL CALC-SCNC: 3 MMOL/L
BASOPHILS # BLD AUTO: 0.1 K/UL (ref 0–0.2)
BASOPHILS NFR BLD AUTO: 1 %
BUN SERPL-SCNC: 21 MG/DL (ref 7–17)
CALCIUM SPEC-MCNC: 7.7 MG/DL (ref 8.4–10.2)
CHLORIDE SERPL-SCNC: 114 MMOL/L (ref 98–107)
CK SERPL-CCNC: 1179 U/L (ref 30–135)
CO2 SERPL-SCNC: 23 MMOL/L (ref 22–30)
EOSINOPHIL # BLD AUTO: 0.4 K/UL (ref 0–0.7)
EOSINOPHIL NFR BLD AUTO: 6 %
ERYTHROCYTE [DISTWIDTH] IN BLOOD BY AUTOMATED COUNT: 3.15 M/UL (ref 3.8–5.4)
ERYTHROCYTE [DISTWIDTH] IN BLOOD: 13.6 % (ref 11.5–15.5)
GLUCOSE SERPL-MCNC: 95 MG/DL (ref 74–99)
HCT VFR BLD AUTO: 30.2 % (ref 34–46)
HGB BLD-MCNC: 10.1 GM/DL (ref 11.4–16)
LYMPHOCYTES # SPEC AUTO: 1 K/UL (ref 1–4.8)
LYMPHOCYTES NFR SPEC AUTO: 15 %
MCH RBC QN AUTO: 32.2 PG (ref 25–35)
MCHC RBC AUTO-ENTMCNC: 33.5 G/DL (ref 31–37)
MCV RBC AUTO: 96 FL (ref 80–100)
MONOCYTES # BLD AUTO: 0.5 K/UL (ref 0–1)
MONOCYTES NFR BLD AUTO: 7 %
NEUTROPHILS # BLD AUTO: 4.8 K/UL (ref 1.3–7.7)
NEUTROPHILS NFR BLD AUTO: 70 %
PLATELET # BLD AUTO: 134 K/UL (ref 150–450)
POTASSIUM SERPL-SCNC: 4.3 MMOL/L (ref 3.5–5.1)
SODIUM SERPL-SCNC: 140 MMOL/L (ref 137–145)
URATE SERPL-MCNC: 3 MG/DL (ref 3.7–7.4)
WBC # BLD AUTO: 6.9 K/UL (ref 3.8–10.6)

## 2019-06-06 RX ADMIN — AMANTADINE HYDROCHLORIDE SCH MG: 100 CAPSULE, LIQUID FILLED ORAL at 12:38

## 2019-06-06 RX ADMIN — LISINOPRIL SCH MG: 20 TABLET ORAL at 10:15

## 2019-06-06 RX ADMIN — BENZTROPINE MESYLATE SCH MG: 0.5 TABLET ORAL at 17:58

## 2019-06-06 RX ADMIN — CEFAZOLIN SCH: 330 INJECTION, POWDER, FOR SOLUTION INTRAMUSCULAR; INTRAVENOUS at 19:09

## 2019-06-06 RX ADMIN — FUROSEMIDE SCH MG: 20 TABLET ORAL at 10:15

## 2019-06-06 RX ADMIN — Medication SCH: at 19:09

## 2019-06-06 RX ADMIN — CEFAZOLIN SCH MLS/HR: 330 INJECTION, POWDER, FOR SOLUTION INTRAMUSCULAR; INTRAVENOUS at 10:14

## 2019-06-06 RX ADMIN — PANTOPRAZOLE SODIUM SCH MG: 40 TABLET, DELAYED RELEASE ORAL at 12:38

## 2019-06-06 RX ADMIN — HALOPERIDOL SCH MG: 5 TABLET ORAL at 12:38

## 2019-06-06 RX ADMIN — BENZTROPINE MESYLATE SCH MG: 0.5 TABLET ORAL at 10:15

## 2019-06-06 RX ADMIN — AMANTADINE HYDROCHLORIDE SCH MG: 100 CAPSULE, LIQUID FILLED ORAL at 21:14

## 2019-06-06 RX ADMIN — BENZTROPINE MESYLATE SCH MG: 0.5 TABLET ORAL at 21:14

## 2019-06-06 NOTE — XR
EXAMINATION TYPE: XR shoulder complete LT

 

DATE OF EXAM: 6/6/2019

 

COMPARISON: NONE

 

HISTORY: Limited Range of Motion. Left shoulder pain from fall

 

TECHNIQUE: Three views are submitted.

 

FINDINGS:

The osseous structures are intact.  There is slight anterior subluxation of the humeral head. Arthrop
athy of the AC joint.  

 

IMPRESSION:

1. Slight anterior subluxation of the humeral head could not exclude a subtle dislocation recommend C
T of the left shoulder.

## 2019-06-06 NOTE — US
EXAMINATION TYPE: US venous doppler duplex UE LT

 

DATE OF EXAM: 6/6/2019

 

COMPARISON: US

 

CLINICAL HISTORY: DVT . DVT per order. Arm swelling.

 

SIDE PERFORMED: Left

 

 

 

Left Arm: No evidence of DVT in the left upper extremity at this time.

 

Spontaneous flow and normal compressibility is noted within the visualized structures.

 

IMPRESSION: 

 No diagnostic evidence of DVT as visualized

## 2019-06-06 NOTE — CT
EXAMINATION TYPE: CT shoulder LT wo con

 

DATE OF EXAM: 6/6/2019

 

COMPARISON: 6/6/2019 images

 

HISTORY: Possible left shoulder dislocation.

 

CT DLP: 261.2 mGycm

Automated exposure control for dose reduction was used.

 

TECHNIQUE: Axial images 3 mm thick sections. Reconstructed images in the coronal and sagittal plane a
re reviewed on the computer.

 

FINDINGS: 

Humeral head articulates with the glenoid. Joint space appears preserved. No acute fractures are evid
ent.

 

Acromioclavicular junction is normal. Scapula and clavicle appear intact. Ribs appear unremarkable. L
jazmyne windows within the field-of-view are unremarkable.

 

IMPRESSION: 

NO SUSPICIOUS DISLOCATION OR SUBLUXATION LEFT SHOULDER.

## 2019-06-06 NOTE — P.PN
Subjective


Progress Note Date: 06/06/19 (Rhabdomyolysis improving)


Principal diagnosis: 





#1 rhabdomyolysis with elevated CPK.


#2 superficial skin laceration on the left side of the abdomen secondary to 

fall.


#3 schizophrenia.


#4 significant weight loss, weight initially was 241 pounds currently 115 pounds

in the underlying psychotic changes indicating not eating.  Patient gets Meals 

on Wheels and her apartment but she presented in the refrigerator and then 2 

units.


#5 left wrist drop with inability to use it.  Rule out fracture, x-ray was 

ordered.


#6 hypertension controlled.





This is dictation on the progress note date of service 06/06/2019 dictation by 

Dr. George.


Patient complaint with the left breast drop and was swollen and warm, we did 

consult Dr. Garcia orthopedic surgeon for evaluation and treatment.


X-ray of the right breast was negative, x-ray of the elbow was negative, and her

arm on the left side suspicious of swelling, we did the DVT of the left upper 

extremities which was negative, and x-ray of the left shoulder as the patient 

fell down at home on the left side, was suspicious of dislocation and 

recommended a computed tomography scan of the left shoulder, which was done but 

was negative no dislocation.


We did not have yet the opinion of Dr. Garcia or Dr. Lipscomb the orthopedic 

surgeon on Y her left hand drop and she could not move it as usual.





On exam her vital sign her temperature this morning was 99.1 orally and 

subsequently she was exemplary 99.6.  We discontinued the IV antibiotic with the

negative urine no growth after 18 hour.  And blood culture no growth after 48 

hour period





Her white count WBC 6.9, hemoglobin is 10.1, platelet count 134.


His estimated glomerular filtration rate more than 90 on admission was 38 with 

the history of acute renal injury secondary to rhabdomyolysis and recovered and 

IV fluid decrease by the nephrology consultation to 70 mL an hour.


Creatinine kinase dropped to 1117 9 which was peaked up to 4941 and 

progressively improving.


Hypocalcemia with drop serum calcium to 7.7, and we find out that her vitamin D 

25-hydroxy 21.8 with the underlying vitamin D deficiency/insufficiency


Patient started on vitamin D3 2000 international unit capsule once daily.


Patient has significant weight loss secondary to her psychiatric disorder she 

schizophrenia however her total protein 7.2 and albumin 4.3, she used to be 241 

pounds and currently she is 55.5 kg around 1:15 pounds with significant weight 

loss.


Her gait is bending over or apparently associated with advanced degenerative 

arthritis of the spine and we did x-ray revealed this finding no compression 

deformity.


Underlying abnormal liver enzyme with the total bilirubin 1.4,  and a LT 

97 however the alk phos 46.


Mild anemia, serum iron 49 and total iron binding capacity TIBC 214 which is low

and iron saturation was 22.9 which is normal


Patient started on iron sulfate 324 mg tablet once daily.





Current examination: Patient request to go to [facility] because

of her inability to use her left hand as well as the associated depression and 

schizophrenia and inability to eat and on her previous admission to nursing home

she did very well and the marketed improvement and actually she need long-term 

nursing home admission.





HEENT: Head was normocephalic and atraumatic pupil was equal reactive and 

conjunctiva was pink sclera was nonicteric.


Neck was supple no JVD no thyromegaly no lymphadenopathy trachea midline.


Chest clear to auscultation and percussion no wheezes no rhonchi's.


Heart: Regular sinus rhythm no evidence of chest pain or anginal pain.  


Abdomen she had skin scrapping or less induration on the left side of the lower 

abdomen associated with her fall at home treated with silver Silvadene cream and

applying gauze once daily.  The abdomen no tenderness and positive bowel sounds.


Extremities no edema however her left wrist drop and we consulted the orthopedic

surgeon to evaluate and we did multiple x-rays as mentioned above.  

Unfortunately we don't have a neurologist rounding in the hospital to consult.  

And if needed will do it as outpatient from the [facility].





Assessment and plan:


#1 rhabdomyolysis gradually improving.  #2 [place].  Also was frequent fall #3 

significant weight loss associated with not eating and need personal 

encouragement.  #4 seen by psychiatrist M.D. recommended supervision and 

continue with the current medication Dr. Mamie orozco.


#5 with the gait bending over with degenerative arthritis of the spine, need for

further rehabilitation and subsequent permanent admission to the nursing home.


#6 history of hypertension stable, nephrology he discontinue the ACE inhibitor. 

#7 will check tomorrow this CK and also plan for transfer tomorrow Edilma was 

continuing to ranging of eating and continued for oral hydration.





Objective





- Vital Signs


Vital signs: 


                                   Vital Signs











Temp  99.6 F   06/06/19 08:00


 


Pulse  102 H  06/06/19 08:00


 


Resp  18   06/06/19 08:00


 


BP  118/56   06/06/19 08:00


 


Pulse Ox  96   06/06/19 08:00








                                 Intake & Output











 06/05/19 06/06/19 06/06/19





 18:59 06:59 18:59


 


Intake Total 722 2000 1920


 


Output Total 650 2500 


 


Balance 72 -500 1920


 


Weight 51.9 kg 55.5 kg 


 


Intake:   


 


  Intake, IV Titration  2000 





  Amount   


 


    Sodium Chloride 0.9% 1,  2000 





    000 ml @ 75 mls/hr IV .   





    [id number] JAR[facility] Rx#:[id number]   


 


  Oral 722  1920


 


Output:   


 


  Urine 650 2500 


 


Other:   


 


  Voiding Method Bedside Commode Bedside Commode Bedside Commode


 


  # Voids  2 3


 


  # Bowel Movements   1














- Labs


CBC & Chem 7: 


                                 06/06/19 06:06





                                 06/06/19 06:06


Labs: 


                  Abnormal Lab Results - Last 24 Hours (Table)











  06/06/19 06/06/19 06/06/19 Range/Units





  06:06 06:06 06:06 


 


RBC   3.15 L   (3.80-5.40)  m/uL


 


Hgb   10.1 L   (11.4-16.0)  gm/dL


 


Hct   30.2 L   (34.0-46.0)  %


 


Plt Count   134 L   (150-450)  k/uL


 


Chloride    114 H  ()  mmol/L


 


BUN    21 H  (7-17)  mg/dL


 


Uric Acid     (3.7-7.4)  mg/dL


 


Calcium    7.7 L  (8.4-10.2)  mg/dL


 


Iron  49 L    ()  ug/dL


 


TIBC  214 L    (228-460)  ug/dL


 


Creatine Kinase     ()  U/L


 


Vitamin D 25-Hydroxy  21.8 L    (30.0-100.0)  ng/mL














  06/06/19 Range/Units





  06:06 


 


RBC   (3.80-5.40)  m/uL


 


Hgb   (11.4-16.0)  gm/dL


 


Hct   (34.0-46.0)  %


 


Plt Count   (150-450)  k/uL


 


Chloride   ()  mmol/L


 


BUN   (7-17)  mg/dL


 


Uric Acid  3.0 L  (3.7-7.4)  mg/dL


 


Calcium   (8.4-10.2)  mg/dL


 


Iron   ()  ug/dL


 


TIBC   (228-460)  ug/dL


 


Creatine Kinase  1179 H*  ()  U/L


 


Vitamin D 25-Hydroxy   (30.0-100.0)  ng/mL








                      Microbiology - Last 24 Hours (Table)











 06/04/19 02:03 Blood Culture - Preliminary





 Blood    No Growth after 48 hours

## 2019-06-06 NOTE — P.PN
Subjective





Patient is seen in follow-up for acute kidney injury which has resolved.  GFR is

back to baseline.  Patient was noted to have mild rhabdomyolysis and CK levels 

have been trending down.  Oral intake is fair.  No vomiting or diarrhea.





Vital signs are stable.


General: The patient appeared well nourished and normally developed. 


HEENT: Head exam is unremarkable. Neck is without jugular venous distension.


LUNGS: Lungs are clear to auscultation and percussion. Breath sounds decreased.


HEART: Rate and Rhythm are regular. First and second heart sounds normal. No 

murmurs, rubs or gallops. 


ABDOMEN: Abdominal exam reveals normal bowel sounds. Non-tender and non-

distended. No evidence of peritonitis.


EXTREMITITES: No clubbing, cyanosis, or edema.





Objective





- Vital Signs


Vital signs: 


                                   Vital Signs











Temp  99.1 F   06/06/19 05:21


 


Pulse  103 H  06/06/19 05:21


 


Resp  16   06/06/19 05:21


 


BP  118/61   06/06/19 05:21


 


Pulse Ox  95   06/06/19 05:21








                                 Intake & Output











 06/05/19 06/06/19 06/06/19





 18:59 06:59 18:59


 


Intake Total 722 2000 480


 


Output Total 650 2500 


 


Balance 72 -500 480


 


Weight 51.9 kg 55.5 kg 


 


Intake:   


 


  Intake, IV Titration  2000 





  Amount   


 


    Sodium Chloride 0.9% 1,  2000 





    000 ml @ 125 mls/hr IV .   





    Q8H [facility] Rx#:[id number]   


 


  Oral 722  480


 


Output:   


 


  Urine 650 2500 


 


Other:   


 


  Voiding Method Bedside Commode Bedside Commode 


 


  # Voids  2 














- Labs


CBC & Chem 7: 


                                 06/06/19 06:06





                                 06/06/19 06:06


Labs: 


                  Abnormal Lab Results - Last 24 Hours (Table)











  06/06/19 06/06/19 Range/Units





  06:06 06:06 


 


RBC  3.15 L   (3.80-5.40)  m/uL


 


Hgb  10.1 L   (11.4-16.0)  gm/dL


 


Hct  30.2 L   (34.0-46.0)  %


 


Plt Count  134 L   (150-450)  k/uL


 


Chloride   114 H  ()  mmol/L


 


BUN   21 H  (7-17)  mg/dL


 


Calcium   7.7 L  (8.4-10.2)  mg/dL








                      Microbiology - Last 24 Hours (Table)











 06/04/19 02:03 Blood Culture - Preliminary





 Blood    No Growth after 48 hours














Assessment and Plan


Plan: 





Assessment:


1.  Acute kidney injury mostly prerenal secondary to diuretics.  Possibly mild 

component of rhabdomyolysis.  Creatinine 1.37 on admission.  Resolved.  GFR back

to baseline. 


2   Mild rhabdomyolysis.  CK level peaked at 4941 and was 2706 as of yesterday. 

She does have moderate blood on her UA and 1 RBC which is suggestive of 

myoglobinuria.


3.  Status post fall.  No acute fractures noted.


4.  Benign hypertension.  Controlled.





Plan:


I will decrease the rate of normal saline to 75 mL an hour.  


Repeat CK level today.


Encouraged oral intake.


Hold lisinopril if systolic blood pressure less than 120.

## 2019-06-07 VITALS — HEART RATE: 94 BPM | TEMPERATURE: 97.7 F | RESPIRATION RATE: 18 BRPM

## 2019-06-07 VITALS — DIASTOLIC BLOOD PRESSURE: 60 MMHG | SYSTOLIC BLOOD PRESSURE: 118 MMHG

## 2019-06-07 RX ADMIN — PANTOPRAZOLE SODIUM SCH: 40 TABLET, DELAYED RELEASE ORAL at 06:12

## 2019-06-07 RX ADMIN — AMANTADINE HYDROCHLORIDE SCH MG: 100 CAPSULE, LIQUID FILLED ORAL at 09:59

## 2019-06-07 RX ADMIN — HALOPERIDOL SCH MG: 5 TABLET ORAL at 09:59

## 2019-06-07 RX ADMIN — BENZTROPINE MESYLATE SCH MG: 0.5 TABLET ORAL at 09:59

## 2019-06-07 RX ADMIN — Medication SCH UNIT: at 09:59

## 2019-06-07 NOTE — P.CNOR
History of Present Illness





- [facility]


Consult date: 06/07/19


Consult reason: joint pain


History of present illness: 





Patient is a 73-year-old female who was admitted to [facility] on 

06/03/2019 with multiple medical issues.  Apparently patient had a fall a few 

days prior, she was found on the floor, and she was on the floor for about 2 

days.  She was found to be in rhabdomyolysis when she left the hospital, she was

admitted to the hospital with multiple medical specialties on consult.  Multiple

x-rays were done of the left upper extremity.  There is concern for possible 

subluxation versus dislocation of the shoulder.  CT images were done of the 

shoulder which demonstrated no acute fractures or dislocations.





Patient was evaluated today at bedside, she is resting comfortably.  Achieving a

history was somewhat difficult due to her other medical issues.  She states that

she's having no pain with the left upper extremity.  Internal medicine did 

notice a wrist drop on the left side.  Review the chart demonstrated no previous

surgery involving the left upper extremity.





Review of Systems


Constitutional: Reports as per [facility]





Past Medical History


Past Medical History: Hypertension


History of Any Multi-Drug Resistant Organisms: None Reported


Past Surgical History: No Surgical Hx Reported


Smoking Status: Never smoker





- Past Family History


  ** Father


Family Medical History: CVA/TIA, Diabetes Mellitus





Medications and Allergies


                                Home Medications











 Medication  Instructions  Recorded  Confirmed  Type


 


Benztropine Mesylate 0.5 mg PO TID 04/20/19 06/03/19 History


 


Haloperidol [Haldol] 5 mg PO DAILY 04/20/19 06/03/19 History


 


Amantadine HCl [Amantadine] 100 mg PO DAILY 06/03/19 06/03/19 History


 


Enalapril [Vasotec] 20 mg PO DAILY 06/03/19 06/03/19 History


 


Furosemide [Lasix] 20 mg PO DAILY 06/03/19 06/03/19 History


 


QUEtiapine FUMARATE [SEROquel] 300 mg PO HS 06/03/19 06/03/19 History


 


amLODIPine [Norvasc] 10 mg PO HS 06/03/19 06/03/19 History








                                    Allergies











Allergy/AdvReac Type Severity Reaction Status Date / Time


 


No Known Allergies Allergy   Verified 06/03/19 19:31














Physical Examination





Left upper extremity:


No open lesions or sores present throughout the left upper extremity


Soft tissue swelling present around the elbow and into the forearm


Medical areas of erythema present


Gentle range of motion of the shoulder reproduces pain, gentle range of motion 

of the elbow reproduces no pain, gentle range of motion of the hand and wrist 

reproduced no pain


Obvious wrist drop is present


She is able to wiggle the fingers and minimal difficulty, she can squeeze my 

hand, there is notable weakness


She can lift the arm with forward elevation and abduction of the shoulder with 

minimal difficulty


Her sensory exam to light touch throughout extremities intact


Her radial pulses 2+


Her soft tissue compartments of the upper extremity are soft





Results





- Labs


Labs: 


                  Abnormal Lab Results - Last 24 Hours (Table)











  06/06/19 06/06/19 Range/Units





  06:06 06:06 


 


Uric Acid   3.0 L  (3.7-7.4)  mg/dL


 


Iron  49 L   ()  ug/dL


 


TIBC  214 L   (228-460)  ug/dL


 


Creatine Kinase   1179 H*  ()  U/L


 


Vitamin D 25-Hydroxy  21.8 L   (30.0-100.0)  ng/mL








                      Microbiology - Last 24 Hours (Table)











 06/04/19 02:03 Blood Culture - Preliminary





 Blood    No Growth after 72 hours








                                      H & H











  06/03/19 06/05/19 06/06/19 Range/Units





  19:03 06:19 06:06 


 


Hgb  13.7  D  10.2 L D  10.1 L  (11.4-16.0)  gm/dL


 


Hct  40.4  31.7 L  30.2 L  (34.0-46.0)  %








                                   Coagulation











  06/03/19 Range/Units





  19:03 


 


INR  1.0  (<1.2)  











Result Diagrams: 


                                 06/06/19 06:06





                                 06/06/19 06:06





Assessment and Plan


Plan: 





Imaging:


Multiple x-rays of the left upper extremity were done, this including hand and 

wrist, elbow, shoulder, along with CT of the shoulder.  Images demonstrated no 

acute fractures or dislocations





Assessment:


1.  Left wrist drop


2.  Left radial nerve palsy


3.  Rhabdomyolysis, recent fall with extended period of time of laying on left 

side


4.  Multiple medical comorbidities





Plan:


I was able to discuss the physical exam findings and imaging studies with my 

attending  Dr. Lipscomb.  Wrist drop is likely due to radial nerve palsy due to

recent fall with extended period of time of laying on the left side.  Recommend 

physical therapy evaluation and treatment after being discharged to rehab





Prescription was placed for cockup wrist splint





No orthopedic surgical intervention





Plan for follow-up in a few weeks with Dr. Lipscomb in the outpatient setting


Time with Patient: Less than 30

## 2019-06-07 NOTE — P.PN
Subjective





Patient is seen in follow-up for acute kidney injury which has resolved.  GFR is

back to baseline.  Patient was noted to have mild rhabdomyolysis and CK levels 

have been trending down.  Oral intake is fair.  No vomiting or diarrhea.





Vital signs are stable.


General: The patient appeared well nourished and normally developed. 


HEENT: Head exam is unremarkable. Neck is without jugular venous distension.


LUNGS: Lungs are clear to auscultation and percussion. Breath sounds decreased.


HEART: Rate and Rhythm are regular. First and second heart sounds normal. No 

murmurs, rubs or gallops. 


ABDOMEN: Abdominal exam reveals normal bowel sounds. Non-tender and non-

distended. No evidence of peritonitis.


EXTREMITITES: No clubbing, cyanosis, or edema.





Objective





- Vital Signs


Vital signs: 


                                   Vital Signs











Temp  98.4 F   06/07/19 05:20


 


Pulse  77   06/07/19 05:20


 


Resp  16   06/07/19 05:20


 


BP  106/53   06/07/19 05:20


 


Pulse Ox  97   06/07/19 05:20








                                 Intake & Output











 06/06/19 06/07/19 06/07/19





 18:59 06:59 18:59


 


Intake Total 3528 600 480


 


Output Total  1100 


 


Balance 3528 -500 480


 


Weight  60 kg 


 


Intake:   


 


  Intake, IV Titration  600 





  Amount   


 


    Sodium Chloride 0.9% 1,  600 





    000 ml @ 75 mls/hr IV .   





    [id number] [facility] Rx#:[id number]   


 


  Oral 3528  480


 


Output:   


 


  Urine  1100 


 


Other:   


 


  Voiding Method Bedside Commode Bedside Commode 





  Incontinent 


 


  # Voids 1 1 


 


  # Bowel Movements 1  














- Labs


CBC & Chem 7: 


                                 06/06/19 06:06





                                 06/06/19 06:06


Labs: 


                  Abnormal Lab Results - Last 24 Hours (Table)











  06/06/19 06/06/19 Range/Units





  06:06 06:06 


 


Uric Acid   3.0 L  (3.7-7.4)  mg/dL


 


Iron  49 L   ()  ug/dL


 


TIBC  214 L   (228-460)  ug/dL


 


Creatine Kinase   1179 H*  ()  U/L


 


Vitamin D 25-Hydroxy  21.8 L   (30.0-100.0)  ng/mL








                      Microbiology - Last 24 Hours (Table)











 06/04/19 02:03 Blood Culture - Preliminary





 Blood    No Growth after 72 hours














Assessment and Plan


Plan: 





Assessment:


1.  Acute kidney injury mostly prerenal secondary to diuretics.  Possibly mild 

component of rhabdomyolysis.  Creatinine 1.37 on admission.  Resolved.  GFR back

to baseline. 


2   Mild rhabdomyolysis.  CK level peaked at 4941 and was 1179 as of yesterday. 

She does have moderate blood on her UA and 1 RBC which is suggestive of 

myoglobinuria.


3.  Status post fall.  No acute fractures noted.


4.  Benign hypertension.  Controlled.





Plan:


Maintain normal saline at 75 mL an hour.  


Encouraged oral intake.


Hold lisinopril if systolic blood pressure less than 120.


Stable to be discharged to rehab from nephrology standpoint.

## 2019-06-07 NOTE — P.DS
Providers


Date of admission: 


06/03/19 20:58





Expected date of discharge: 06/07/19


Attending physician: 


Waqas George





Consults: 





                                        





06/03/19 20:59


Consult Physician Routine 


   Consulting Provider: Cleopatra Garcia


   Consult Reason/Comments: rhabdo


   Do you want consulting provider notified?: Yes





06/04/19 08:00


Consult Physician Routine 


   Consulting Provider: Penelope Martel


   Consult Reason/Comments: depression, history of bipolar/schizophrenia


   Do you want consulting provider notified?: Already Contacted





06/06/19 15:04


Consult Physician Urgent 


   Consulting Provider: Willie Lipscomb


   Consult Reason/Comments: shoulder pain


   Do you want consulting provider notified?: Already Contacted











Primary care physician: 


Waqas George


Discharge diagnoses:


#1.  Rhabdomyolysis secondary to fall, full risk, improving.


#2 left radial pulse he with the left hand drop secondary to the fall.  Need for

further rehabilitation.


#3 schizophrenia was Stopped Eating.


#4 lost some muscle mass and weight loss from 241-115 pounds.  With normal 

protein and albumin.


#5 recurrent fall with the [place] secondary to degenerative 

arthritis OF the lumbosacral spine advance.


#6 dehydration.  #7 currently hypotensive has been adjusted her medication with 

the discontinuation of amlodipine and lisinopril and to be monitored her blood 

pressure daily in the [facility] current blood pressure 106/53.


#7 acute kidney injury.  I'll secondary to diuretics which discontinued as well.


#8 negative urine culture.


#9 benign hypertension.


#10 psychiatric consultation with the recommendation outpatient supervision and 

continuing on her current medication Haldol 5 mg daily increase the amantadine 

200 mg twice a day.  As the psychiatry consultation done by Dr. Hylton.


#11 consultation with Dr. Nory Egan orthopedic surgeon seen by CHRISTIE Mittal with the diagnoses left wrist drop and left radial nerve palsy.


#12 vitamin D3 insufficiency.


#13 she had laceration on the abdomen and the left side of the skin treated with

silver Silvadene cream and gauze once daily.





#14 she had tardive dyskinesia with the extra pyramidal adverse effect of her 

medication chronically present with lip masking.





Patient presentation to the ER:


Bruit by ambulance from her apartment after they found her fell down on her left

side and obtundation and dehydrated and cachectic, with rhabdomyolysis and the 

CPK was progressively elevated from 8038-8247.  Patient admitted to the hospital

so elevated BUN and creatinine and consultation with nephrology Dr. Rivers and 

hydration her urine was suspicious of infection started on antibiotic until the 

culture was obtained.  Annual





Hospital course:


Patient hydrated seen by nephrology, patient recovered anterior renal function 

returned to the normal and her medication is adjusted as well, her hypertension 

become hypo-tension and medicine adjusted accordingly.  And to be continued to 

be monitored in the nursing home.


Patient complaining of drop of her left hand, she is right handed, we consulted 

orthopedic doctor, and his PA did see the patient and ordered splint of the left

hand.


The CPK has been gradual improvement and the patient more conscious alert 

oriented 3 and the she request to be in [facility] and she has been there before, 

with the psychiatry recommended to be supervised all the time and needs to be 

for long-term nursing home.





Examination on the discharge:


Patient conscious alert oriented 3 her computed tomography scan on admission 

was negative.


HEENT negative currently able to eat and swallow with the encouragement needed 

and occasional nursing aide support, natural teeth normal swallowing.  Normal 

hearing.


Neck was supple no JVD no thyromegaly no lymphadenopathy trachea midline.


Chest clear to auscultation and percussion.


Heart: Regular sinus rhythm.


Abdomen soft positive bowel sound.  However she had skin laceration from the 

left side midline exempla to almost the umbilicus treated with silver Silvadene 

cream and covered with gauze with good healing.


Extremities no edema and positive pulses and she had the right foot was treated 

in the past by Dr. Mathews.


Psychiatry: Schizophrenia with exacerbation intermittently and recommendation 

from to psychiatrist Dr. Riley and as well as the psychiatry hospitalist Dr. Penelope mercado for permanent supervision and the need for nursing home placement.


Neurologically: She had a tardive dyskinesia chronically with the extra 

pyramidal effect of her medication.





Assessment and plan: Patient stable general condition , transferred to [facility] and rehab, and I'll be following the patient in the nursing 

home.  Continue the current medication:


#1 amantadine 100 mg twice a day.  #2 amlodipine discontinued #3 Cogentin 0.5 mg

3 times a day #4 vitamin D3 2000 unit by mouth daily gelatinous yellow.  #5 

ferrous sulfate 325 mg once daily.  #6 Haldol 5 mg daily in a.m. #7 pantoprazole

sodium 40 mg capsule before meals breakfast #8 Seroquel 300 mg daily at bedtime 

daily.  #9 silver Diazine cream topical on the left lower abdomen laceration 

once daily and applying gauze.








Discontinued medication: Amlodipine, little lisinopril,


Blood pressure check daily.


Patient Condition at Discharge: Fair





Plan - Discharge Summary


New Discharge Prescriptions: 


Discontinued


   amLODIPine [Norvasc] 10 mg PO HS


   Enalapril [Vasotec] 20 mg PO DAILY





No Action


   Haloperidol [Haldol] 5 mg PO DAILY


   Benztropine Mesylate 0.5 mg PO TID


   QUEtiapine FUMARATE [SEROquel] 300 mg PO HS


   Furosemide [Lasix] 20 mg PO DAILY


   Amantadine HCl [Amantadine] 100 mg PO DAILY


Discharge Medication List





Benztropine Mesylate 0.5 mg PO TID 04/20/19 [History]


Haloperidol [Haldol] 5 mg PO DAILY 04/20/19 [History]


Amantadine HCl [Amantadine] 100 mg PO DAILY 06/03/19 [History]


Furosemide [Lasix] 20 mg PO DAILY 06/03/19 [History]


QUEtiapine FUMARATE [SEROquel] 300 mg PO HS 06/03/19 [History]








Follow up Appointment(s)/Referral(s): 


Waqas George MD [Primary Care Provider] - 1-2 days


Activity/Diet/Wound Care/Special Instructions: 


Pancho

## 2019-07-09 ENCOUNTER — HOSPITAL ENCOUNTER (INPATIENT)
Dept: HOSPITAL 47 - EC | Age: 74
LOS: 3 days | Discharge: NURSING, REHAB OR LONG-TERM CARE | DRG: 558 | End: 2019-07-12
Payer: MEDICARE

## 2019-07-09 VITALS — BODY MASS INDEX: 21.7 KG/M2

## 2019-07-09 DIAGNOSIS — G93.49: ICD-10-CM

## 2019-07-09 DIAGNOSIS — Z83.3: ICD-10-CM

## 2019-07-09 DIAGNOSIS — E86.0: ICD-10-CM

## 2019-07-09 DIAGNOSIS — D64.9: ICD-10-CM

## 2019-07-09 DIAGNOSIS — T43.4X5A: ICD-10-CM

## 2019-07-09 DIAGNOSIS — Z79.899: ICD-10-CM

## 2019-07-09 DIAGNOSIS — Z82.3: ICD-10-CM

## 2019-07-09 DIAGNOSIS — G21.19: ICD-10-CM

## 2019-07-09 DIAGNOSIS — N30.91: ICD-10-CM

## 2019-07-09 DIAGNOSIS — Z87.440: ICD-10-CM

## 2019-07-09 DIAGNOSIS — T50.905A: ICD-10-CM
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
## 2019-07-09 DIAGNOSIS — F20.9: ICD-10-CM

## 2019-07-09 DIAGNOSIS — G56.32: ICD-10-CM

## 2019-07-09 DIAGNOSIS — R29.6: ICD-10-CM

## 2019-07-09 DIAGNOSIS — D69.6: ICD-10-CM

## 2019-07-09 DIAGNOSIS — G24.01: ICD-10-CM

## 2019-07-09 DIAGNOSIS — I11.9: ICD-10-CM

## 2019-07-09 DIAGNOSIS — Z81.8: ICD-10-CM

## 2019-07-09 DIAGNOSIS — B96.20: ICD-10-CM

## 2019-07-09 DIAGNOSIS — M62.82: Primary | ICD-10-CM

## 2019-07-09 DIAGNOSIS — Z99.3: ICD-10-CM

## 2019-07-09 DIAGNOSIS — R33.9: ICD-10-CM

## 2019-07-09 LAB
ALBUMIN SERPL-MCNC: 4.1 G/DL (ref 3.5–5)
ALP SERPL-CCNC: 78 U/L (ref 38–126)
ALT SERPL-CCNC: 29 U/L (ref 9–52)
ANION GAP SERPL CALC-SCNC: 8 MMOL/L
AST SERPL-CCNC: 69 U/L (ref 14–36)
BASOPHILS # BLD AUTO: 0 K/UL (ref 0–0.2)
BASOPHILS NFR BLD AUTO: 0 %
BUN SERPL-SCNC: 23 MG/DL (ref 7–17)
CALCIUM SPEC-MCNC: 9.4 MG/DL (ref 8.4–10.2)
CHLORIDE SERPL-SCNC: 107 MMOL/L (ref 98–107)
CO2 SERPL-SCNC: 27 MMOL/L (ref 22–30)
EOSINOPHIL # BLD AUTO: 0.1 K/UL (ref 0–0.7)
EOSINOPHIL NFR BLD AUTO: 2 %
ERYTHROCYTE [DISTWIDTH] IN BLOOD BY AUTOMATED COUNT: 2.01 M/UL (ref 3.8–5.4)
ERYTHROCYTE [DISTWIDTH] IN BLOOD: 12.9 % (ref 11.5–15.5)
GLUCOSE SERPL-MCNC: 114 MG/DL (ref 74–99)
HCT VFR BLD AUTO: 19.1 % (ref 34–46)
HGB BLD-MCNC: 6.2 GM/DL (ref 11.4–16)
KETONES UR QL STRIP.AUTO: (no result)
LYMPHOCYTES # SPEC AUTO: 0.9 K/UL (ref 1–4.8)
LYMPHOCYTES NFR SPEC AUTO: 19 %
MAGNESIUM SPEC-SCNC: 1.9 MG/DL (ref 1.6–2.3)
MCH RBC QN AUTO: 30.9 PG (ref 25–35)
MCHC RBC AUTO-ENTMCNC: 32.5 G/DL (ref 31–37)
MCV RBC AUTO: 95.1 FL (ref 80–100)
MONOCYTES # BLD AUTO: 0.4 K/UL (ref 0–1)
MONOCYTES NFR BLD AUTO: 8 %
NEUTROPHILS # BLD AUTO: 3.1 K/UL (ref 1.3–7.7)
NEUTROPHILS NFR BLD AUTO: 68 %
PH UR: 5.5 [PH] (ref 5–8)
PLATELET # BLD AUTO: 102 K/UL (ref 150–450)
POTASSIUM SERPL-SCNC: 3.7 MMOL/L (ref 3.5–5.1)
PROT SERPL-MCNC: 6.9 G/DL (ref 6.3–8.2)
PROT UR QL: (no result)
RBC UR QL: 5 /HPF (ref 0–5)
SODIUM SERPL-SCNC: 142 MMOL/L (ref 137–145)
SP GR UR: 1.02 (ref 1–1.03)
SQUAMOUS UR QL AUTO: 1 /HPF (ref 0–4)
UROBILINOGEN UR QL STRIP: <2 MG/DL (ref ?–2)
WBC # BLD AUTO: 4.6 K/UL (ref 3.8–10.6)
WBC #/AREA URNS HPF: 139 /HPF (ref 0–5)

## 2019-07-09 PROCEDURE — 84443 ASSAY THYROID STIM HORMONE: CPT

## 2019-07-09 PROCEDURE — 85025 COMPLETE CBC W/AUTO DIFF WBC: CPT

## 2019-07-09 PROCEDURE — 83605 ASSAY OF LACTIC ACID: CPT

## 2019-07-09 PROCEDURE — 71046 X-RAY EXAM CHEST 2 VIEWS: CPT

## 2019-07-09 PROCEDURE — 93005 ELECTROCARDIOGRAM TRACING: CPT

## 2019-07-09 PROCEDURE — 86901 BLOOD TYPING SEROLOGIC RH(D): CPT

## 2019-07-09 PROCEDURE — 96365 THER/PROPH/DIAG IV INF INIT: CPT

## 2019-07-09 PROCEDURE — 85027 COMPLETE CBC AUTOMATED: CPT

## 2019-07-09 PROCEDURE — 96361 HYDRATE IV INFUSION ADD-ON: CPT

## 2019-07-09 PROCEDURE — 82550 ASSAY OF CK (CPK): CPT

## 2019-07-09 PROCEDURE — 36415 COLL VENOUS BLD VENIPUNCTURE: CPT

## 2019-07-09 PROCEDURE — 86900 BLOOD TYPING SEROLOGIC ABO: CPT

## 2019-07-09 PROCEDURE — 87040 BLOOD CULTURE FOR BACTERIA: CPT

## 2019-07-09 PROCEDURE — 87086 URINE CULTURE/COLONY COUNT: CPT

## 2019-07-09 PROCEDURE — 83880 ASSAY OF NATRIURETIC PEPTIDE: CPT

## 2019-07-09 PROCEDURE — 99285 EMERGENCY DEPT VISIT HI MDM: CPT

## 2019-07-09 PROCEDURE — 87077 CULTURE AEROBIC IDENTIFY: CPT

## 2019-07-09 PROCEDURE — 87186 SC STD MICRODIL/AGAR DIL: CPT

## 2019-07-09 PROCEDURE — 84100 ASSAY OF PHOSPHORUS: CPT

## 2019-07-09 PROCEDURE — 70450 CT HEAD/BRAIN W/O DYE: CPT

## 2019-07-09 PROCEDURE — 82746 ASSAY OF FOLIC ACID SERUM: CPT

## 2019-07-09 PROCEDURE — 82607 VITAMIN B-12: CPT

## 2019-07-09 PROCEDURE — 86850 RBC ANTIBODY SCREEN: CPT

## 2019-07-09 PROCEDURE — 80053 COMPREHEN METABOLIC PANEL: CPT

## 2019-07-09 PROCEDURE — 81001 URINALYSIS AUTO W/SCOPE: CPT

## 2019-07-09 PROCEDURE — 84484 ASSAY OF TROPONIN QUANT: CPT

## 2019-07-09 PROCEDURE — 80048 BASIC METABOLIC PNL TOTAL CA: CPT

## 2019-07-09 PROCEDURE — 96375 TX/PRO/DX INJ NEW DRUG ADDON: CPT

## 2019-07-09 PROCEDURE — 84132 ASSAY OF SERUM POTASSIUM: CPT

## 2019-07-09 PROCEDURE — 83735 ASSAY OF MAGNESIUM: CPT

## 2019-07-09 RX ADMIN — BENZTROPINE MESYLATE SCH MG: 0.5 TABLET ORAL at 23:14

## 2019-07-09 RX ADMIN — PANTOPRAZOLE SODIUM SCH MG: 40 INJECTION, POWDER, FOR SOLUTION INTRAVENOUS at 23:13

## 2019-07-09 NOTE — ED
Weakness HPI





- General


Stated complaint: Weakness


Time Seen by Provider: 07/09/19 16:22


Source: RN notes reviewed, old records reviewed





- History of Present Illness


MD Complaint: generalized weakness





- Related Data


                                Home Medications











 Medication  Instructions  Recorded  Confirmed


 


Benztropine Mesylate 0.5 mg PO TID 04/20/19 07/09/19


 


Haloperidol [Haldol] 5 mg PO DAILY 04/20/19 07/09/19


 


Amantadine HCl [Amantadine] 100 mg PO BID 06/03/19 07/09/19


 


QUEtiapine FUMARATE [SEROquel] 300 mg PO HS 06/03/19 07/09/19


 


Cholecalciferol [Vitamin D3 (25 1,000 unit PO DAILY 07/09/19 07/09/19





Mcg = 1000 Iu)]   


 


Ferrous Sulfate [Feosol] 325 mg PO DAILY 07/09/19 07/09/19


 


Pantoprazole [Protonix] 40 mg PO DAILY 07/09/19 07/09/19











                                    Allergies











Allergy/AdvReac Type Severity Reaction Status Date / Time


 


No Known Allergies Allergy   Verified 07/09/19 16:22














Review of Systems


ROS Statement: 


Those systems with pertinent positive or pertinent negative responses have been 

documented in the HPI.





ROS Other: All systems not noted in ROS Statement are negative.





Past Medical History


Past Medical History: Hypertension


History of Any Multi-Drug Resistant Organisms: None Reported


Past Surgical History: No Surgical Hx Reported


Smoking Status: Never smoker





- Past Family History


  ** Father


Family Medical History: CVA/TIA, Diabetes Mellitus





General Exam


General appearance: alert, in no apparent distress


Head exam: Present: atraumatic, normocephalic, normal inspection


Eye exam: Present: normal appearance, PERRL, EOMI.  Absent: scleral icterus, 

conjunctival injection, periorbital swelling


ENT exam: Present: normal exam, mucous membranes moist


Neck exam: Present: normal inspection.  Absent: tenderness, meningismus, 

lymphadenopathy


Respiratory exam: Present: normal lung sounds bilaterally.  Absent: respiratory 

distress, wheezes, rales, rhonchi, stridor


Cardiovascular Exam: Present: regular rate, normal rhythm, normal heart sounds. 

 Absent: systolic murmur, diastolic murmur, rubs, gallop, clicks


GI/Abdominal exam: Present: soft, normal bowel sounds.  Absent: distended, 

tenderness, guarding, rebound, rigid


Extremities exam: Present: normal inspection, full ROM, normal capillary refill.

  Absent: tenderness, pedal edema, joint swelling, calf tenderness


Back exam: Present: normal inspection


Neurological exam: Present: alert, oriented X3, CN II-XII intact


Psychiatric exam: Present: normal affect, normal mood


Skin exam: Present: warm, dry, intact, normal color.  Absent: rash





Course


                                   Vital Signs











  07/09/19





  16:28


 


Temperature 99.2 F


 


Pulse Rate 88


 


Respiratory 17





Rate 


 


Blood Pressure 176/99


 


O2 Sat by Pulse 96





Oximetry 














EKG Findings





- EKG Comments:


EKG Findings:: EKG shows sinus tachycardia rate of 104, , QRS 92, QTc 502





Medical Decision Making





- Lab Data


Result diagrams: 


                                 07/09/19 17:11


                                   Lab Results











  07/09/19 07/09/19 07/09/19 Range/Units





  17:11 17:11 17:11 


 


Sodium  142    (137-145)  mmol/L


 


Potassium  3.7    (3.5-5.1)  mmol/L


 


Chloride  107    ()  mmol/L


 


Carbon Dioxide  27    (22-30)  mmol/L


 


Anion Gap  8    mmol/L


 


BUN  23 H    (7-17)  mg/dL


 


Creatinine  0.93    (0.52-1.04)  mg/dL


 


Est GFR (CKD-EPI)AfAm  71    (>60 ml/min/1.73 sqM)  


 


Est GFR (CKD-EPI)NonAf  62    (>60 ml/min/1.73 sqM)  


 


Glucose  114 H    (74-99)  mg/dL


 


Plasma Lactic Acid Lokesh   1.0   (0.7-2.0)  mmol/L


 


Calcium  9.4    (8.4-10.2)  mg/dL


 


Phosphorus  3.5    (2.5-4.5)  mg/dL


 


Magnesium  1.9    (1.6-2.3)  mg/dL


 


Total Bilirubin  0.8    (0.2-1.3)  mg/dL


 


AST  69 H    (14-36)  U/L


 


ALT  29    (9-52)  U/L


 


Alkaline Phosphatase  78    ()  U/L


 


Creatine Kinase  2049 H*    ()  U/L


 


Troponin I    0.034  (0.000-0.034)  ng/mL


 


Total Protein  6.9    (6.3-8.2)  g/dL


 


Albumin  4.1    (3.5-5.0)  g/dL


 


Urine Color     


 


Urine Appearance     (Clear)  


 


Urine pH     (5.0-8.0)  


 


Ur Specific Gravity     (1.001-1.035)  


 


Urine Protein     (Negative)  


 


Urine Glucose (UA)     (Negative)  


 


Urine Ketones     (Negative)  


 


Urine Blood     (Negative)  


 


Urine Nitrite     (Negative)  


 


Urine Bilirubin     (Negative)  


 


Urine Urobilinogen     (<2.0)  mg/dL


 


Ur Leukocyte Esterase     (Negative)  


 


Urine RBC     (0-5)  /hpf


 


Urine WBC     (0-5)  /hpf


 


Urine WBC Clumps     (None)  /hpf


 


Ur Squamous Epith Cells     (0-4)  /hpf


 


Amorphous Sediment     (None)  /hpf


 


Urine Mucus     (None)  /hpf














  07/09/19 Range/Units





  17:30 


 


Sodium   (137-145)  mmol/L


 


Potassium   (3.5-5.1)  mmol/L


 


Chloride   ()  mmol/L


 


Carbon Dioxide   (22-30)  mmol/L


 


Anion Gap   mmol/L


 


BUN   (7-17)  mg/dL


 


Creatinine   (0.52-1.04)  mg/dL


 


Est GFR (CKD-EPI)AfAm   (>60 ml/min/1.73 sqM)  


 


Est GFR (CKD-EPI)NonAf   (>60 ml/min/1.73 sqM)  


 


Glucose   (74-99)  mg/dL


 


Plasma Lactic Acid Lokesh   (0.7-2.0)  mmol/L


 


Calcium   (8.4-10.2)  mg/dL


 


Phosphorus   (2.5-4.5)  mg/dL


 


Magnesium   (1.6-2.3)  mg/dL


 


Total Bilirubin   (0.2-1.3)  mg/dL


 


AST   (14-36)  U/L


 


ALT   (9-52)  U/L


 


Alkaline Phosphatase   ()  U/L


 


Creatine Kinase   ()  U/L


 


Troponin I   (0.000-0.034)  ng/mL


 


Total Protein   (6.3-8.2)  g/dL


 


Albumin   (3.5-5.0)  g/dL


 


Urine Color  Yellow  


 


Urine Appearance  Cloudy H  (Clear)  


 


Urine pH  5.5  (5.0-8.0)  


 


Ur Specific Gravity  1.025  (1.001-1.035)  


 


Urine Protein  1+ H  (Negative)  


 


Urine Glucose (UA)  Negative  (Negative)  


 


Urine Ketones  1+ H  (Negative)  


 


Urine Blood  Trace H  (Negative)  


 


Urine Nitrite  Negative  (Negative)  


 


Urine Bilirubin  Negative  (Negative)  


 


Urine Urobilinogen  <2.0  (<2.0)  mg/dL


 


Ur Leukocyte Esterase  Large H  (Negative)  


 


Urine RBC  5  (0-5)  /hpf


 


Urine WBC  139 H  (0-5)  /hpf


 


Urine WBC Clumps  Few H  (None)  /hpf


 


Ur Squamous Epith Cells  1  (0-4)  /hpf


 


Amorphous Sediment  Rare H  (None)  /hpf


 


Urine Mucus  Rare H  (None)  /hpf














Disposition


Clinical Impression: 


 UTI (urinary tract infection), Weakness, Dehydration, Rhabdomyolysis





Disposition: ADMITTED AS IP TO THIS HOSP


Condition: Fair


Is patient prescribed a controlled substance at d/c from ED?: No


Referrals: 


Waqas George MD [Primary Care Provider] - 1-2 days

## 2019-07-09 NOTE — P.HPIM
History of Present Illness


H&P Date: 19 (Hemoglobin 6.2)


Chief Complaint: Fell down from the wheelchair known today.





This is a history and physical dictation by JOHN PAUL Quevedo. [facility]


Patient brought by ambulance to the emergency room after her caregiver checked 

on her and found hair on the floor beside the wheelchair and couldn't get up.


Pro-to the emergency room and found that she had hemoglobin 6.2 with the unknown

etiology of blood loss from bilateral or from the stomach.  And patient has 

underlying tardive dyskinesia with the extrapyramidal symptoms due to medication

the house until which was necessary to be used for her underlying psychiatric 

disorder and schizophrenia.


In the emergency room she had x-ray of the left hand which was negative for 

fracture and she had a splint with the underlying history of radial pulse 3 from

her previous fall and at that time she was admitted to the hospital with the 

previous presence of the previous admission of rhabdo myolysis as well.


In the ER as well they found her chest x-ray done and was negative as well and 

her EKG indicated sinus tachycardia with the left atrial enlargement and 

questionable of septal infarct and it in mind age.  However no acute abnor

malities


In the emergency room also did a CBC and chemistry: And they found that she had 

rhabdomyolysis with CPK creatinine kinase more than 2000.


They found also hemoglobin 6.2 with a significant drop over hemoglobin with the 

history very poor, and she denied any vomiting blood or blood in the stools or 

black stools.


They called and they are her brother who is a caregiver and he agreed with blood

transfusion.  As well as I did speak to her and indicating that her illness and 

disease need the blood transfusion and the she has no Holiness again is to 

blood transfusion.  And she will be transfused 2 units of packed RBCs after we 

obtain the blood cultures, patient has significant urinary tract infection and 

despite that her white count is normal.


On the floor I did examine the patient found that she had distended bladder and 

we did a bladder scan more than 900 mL with the urine retention as well as a 

urine analysis in the ER indicating urinary tract infection they gave her 

Rocephin and we increase the Rocephin to every 12 hour 1 g with the 

consideration of possibility of sepsis as patient gets shaky while we while I 

was examining her.


I did resume her medication.








Past medical history:


She was recently admitted to the hospital and at that time have similar 

situation of fall and we did consult at that time the orthopedic for her left 

hand and stated that radial pulse he from the fall and the potential her on left

hand splint.  Also she had rhabdomyolysis secondary to the fall and patient was 

hydrated and recovered.  In the lost admission patient seen by psychiatrist DR. ma who discussed that with Dr. Riley her outpatient psychiatrist and both 

decided that patient need to be under observation constantly, suspect 

subsequently patient discharged to [facility] with the interested to stay 

there subsequently after the rehabilitation, unfortunately I did ask the patient

and she stated that she was like to stay however her brother took her back to 

her assisted living and she is not ambulatory at this time and using wheelchair 

and could not serve herself and on this admission she fell down again, we will 

be requesting the  for the future planning for nursing home as well

as constant supervision in the nursing home.  Hopefully her brother agreed with 

that who is a caregiver and he is a power of .





ALLERGY unknown


Social history she is single never .


Other medical problem:


Hypertension.


Psychiatric disorder with underlying adverse effect of heartburn however she 

needed to continue with this medication per the psychiatrist with the associated

extrapyramidal signs as tardive dyskinesia with lip masking and stuttering of 

speech make it's difficult to understand and communicate as well.  In her last 

visit to the hospital psychiatrist did not change her medication and the 

recommended to continue the same.





Never   0 para 0.


Habits no smoking and no drinking.


Family history:


She has one brother who is the POA.


Her mother who was taking care of her diet.





Review of system:


Psychiatry: Psychiatric disorder with the underlying schizophrenia.


Neurology: Left hand radial palsy with inability to move her fingers and the 

rest could not help her right hand.  No history of stroke and no evidence of 

stroke.


Cardiovascular: No history of previous MI, history of hypertension with 

hypertensive heart disease.


Pulmonary: No cough or expectoration no shortness of breath.


GI: With a hemoglobin drop to 6.2 on this admission patient is on how where of 

any vomiting blood or blood with the bowel movement black or red and she has 

history of incontinent and she doesn't look to the stools.


Chest x-ray was negative


Genitourinary: Incontinent.  With that consideration of the urine only however 

we are not sure so far.


Musculoskeletal: Left hand radial pulse he with the history of fall without 

fracture of the bone.


Endocrinology: No diabetes and no thyroid disease.


The rest of the review of system polit non-contributory and no history of 

seizure disorder.





Physical exam:


Patient was conscious alert she has difficulty of expressed the story of her 

fall as she has been not supervised.


The head was normocephalic and atraumatic, pupil was equal reactive, conjunctiva

was pale and sclera nonicteric.


Oropharynx natural teeth able to swallow with no choking.


Nose no bleeding or discharge.


Ears some wax but is able to hear.


Neck supple no JVD no thyromegaly no lymphadenopathy trachea midline.


Chest is clear to auscultation and percussion no wheezes no rhonchi's.


Heart regular sinus rhythm with EKG indicating tachycardia no previous history 

of MI no history of angina.


Abdomen soft positive bowel sound and distention of the bladder with discomfort 

on palpation, bladder scan indicating 900 mL after placement of the Parham 

catheter as well with the presence of urine retention, UTI with cystitis is 

considered and sepsis Association considered and blood culture was obtained on 

the floor and despite that her white count is normal.  As patient was having 

shaky and I thought that could be underlying sepsis.


Extremities: She had a right lower leg history of venous ulcers has been treated

by  didn't the vascular surgeon in the past currently healed.  The right leg 

mild edema of the ankle and the shaft, the left lower extremities she has 2+ 

pitting edema the etiology is unknown however she had the rhabdo and she had 

previous ultrasound on the previous admission which was essentially negative 

however probably we will wait until the blood culture and we will obtain 

ultrasound of the left lower extremities on the exam there is no tenderness on 

the course of the superficial femoral veins with the blood perfusion is normal 

with pulses is intact on the left lower extremities.


Neurological exam: No evidence of cranial nerve deficit, she had left hand palsy

with the splint.


She had some psychiatric disorder however she currently on her medication that 

has been before prescribed by Dr. Riley psychiatrist.





Assessment and plan


#1 hemoglobin 6.20 with significant drop associated with anemia unknown et

iology, could be secondary to blood loss, we'll consult gastroenterology for 

evaluation and possible upper and lower endoscopy to clarify the blood loss.


#2 rhabdo myolysis anticoagulant secondary to the fall, we will hydrate and 

monitor the renal function.


#3 urinary tract infection, urinary retention was more than 900 mL with the 

placement of the Parham catheter, cystitis with discomfort on exam and possible 

sepsis Association, blood culture was obtained.


#4 history of hypertension currently stable.


#5 underlying psychiatric disorder with tardive dyskinesia secondary to the 

Haldol which we could not change it better the psychiatrist.


#6 continue hydration, 2 units of blood packed RBCs transfusion tonight, 

hopefully the gastroenterology scope her tomorrow, patient on clear liquid diet.


#7 patient was started on Protonix IV 40 mg twice a day tell seen by 

gastroenterology.











Past Medical History


Past Medical History: Hypertension


History of Any Multi-Drug Resistant Organisms: None Reported


Past Surgical History: No Surgical Hx Reported


Smoking Status: Never smoker





- Past Family History


  ** Father


Family Medical History: CVA/TIA, Diabetes Mellitus





Medications and Allergies


                                Home Medications











 Medication  Instructions  Recorded  Confirmed  Type


 


Benztropine Mesylate 0.5 mg PO TID 19 History


 


Haloperidol [Haldol] 5 mg PO DAILY 19 History


 


Amantadine HCl [Amantadine] 100 mg PO BID 19 History


 


QUEtiapine FUMARATE [SEROquel] 300 mg PO HS 19 History


 


Cholecalciferol [Vitamin D3 (25 1,000 unit PO DAILY 19 History





Mcg = 1000 Iu)]    


 


Ferrous Sulfate [Feosol] 325 mg PO DAILY 19 History


 


Pantoprazole [Protonix] 40 mg PO DAILY 19 History








                                    Allergies











Allergy/AdvReac Type Severity Reaction Status Date / Time


 


No Known Allergies Allergy   Verified 19 16:22














Physical Exam


Vitals: 


                                   Vital Signs











  Temp Pulse Resp BP Pulse Ox


 


 19 20:58   93  18  168/92  95


 


 19 20:34   98  18  161/103 


 


 19 20:09  98.6 F  100  18  177/98  99


 


 19 19:10  98.4 F  94  18  178/101  96


 


 19 16:28  99.2 F  88  17  176/99  96








                                Intake and Output











 19





 06:59 14:59 22:59


 


Other:   


 


  Weight   54.431 kg














Results


CBC & Chem 7: 


                                 19 18:27





                                 19 17:11


Labs: 


                  Abnormal Lab Results - Last 24 Hours (Table)











  19 Range/Units





  17:11 17:30 18:27 


 


RBC    2.01 L  (3.80-5.40)  m/uL


 


Hgb    6.2 L* D  (11.4-16.0)  gm/dL


 


Hct    19.1 L*  (34.0-46.0)  %


 


Plt Count    102 L D  (150-450)  k/uL


 


Lymphocytes #    0.9 L  (1.0-4.8)  k/uL


 


BUN  23 H    (7-17)  mg/dL


 


Glucose  114 H    (74-99)  mg/dL


 


AST  69 H    (14-36)  U/L


 


Creatine Kinase  2049 H*    ()  U/L


 


Urine Appearance   Cloudy H   (Clear)  


 


Urine Protein   1+ H   (Negative)  


 


Urine Ketones   1+ H   (Negative)  


 


Urine Blood   Trace H   (Negative)  


 


Ur Leukocyte Esterase   Large H   (Negative)  


 


Urine WBC   139 H   (0-5)  /hpf


 


Urine WBC Clumps   Few H   (None)  /hpf


 


Amorphous Sediment   Rare H   (None)  /hpf


 


Urine Mucus   Rare H   (None)  /hpf

## 2019-07-10 LAB
ANION GAP SERPL CALC-SCNC: 7 MMOL/L
BUN SERPL-SCNC: 16 MG/DL (ref 7–17)
CALCIUM SPEC-MCNC: 8.3 MG/DL (ref 8.4–10.2)
CHLORIDE SERPL-SCNC: 111 MMOL/L (ref 98–107)
CK SERPL-CCNC: 1131 U/L (ref 30–135)
CO2 SERPL-SCNC: 23 MMOL/L (ref 22–30)
ERYTHROCYTE [DISTWIDTH] IN BLOOD BY AUTOMATED COUNT: 3.19 M/UL (ref 3.8–5.4)
ERYTHROCYTE [DISTWIDTH] IN BLOOD: 12.9 % (ref 11.5–15.5)
GLUCOSE SERPL-MCNC: 131 MG/DL (ref 74–99)
HCT VFR BLD AUTO: 30.9 % (ref 34–46)
HGB BLD-MCNC: 10.1 GM/DL (ref 11.4–16)
MCH RBC QN AUTO: 31.7 PG (ref 25–35)
MCHC RBC AUTO-ENTMCNC: 32.8 G/DL (ref 31–37)
MCV RBC AUTO: 96.7 FL (ref 80–100)
PLATELET # BLD AUTO: 165 K/UL (ref 150–450)
POTASSIUM SERPL-SCNC: 3.2 MMOL/L (ref 3.5–5.1)
SODIUM SERPL-SCNC: 141 MMOL/L (ref 137–145)
WBC # BLD AUTO: 7.5 K/UL (ref 3.8–10.6)

## 2019-07-10 RX ADMIN — DEXTROSE MONOHYDRATE, SODIUM CHLORIDE, AND POTASSIUM CHLORIDE SCH MLS/HR: 50; 9; 1.49 INJECTION, SOLUTION INTRAVENOUS at 20:38

## 2019-07-10 RX ADMIN — BENZTROPINE MESYLATE SCH MG: 0.5 TABLET ORAL at 20:38

## 2019-07-10 RX ADMIN — AMANTADINE HYDROCHLORIDE SCH MG: 100 CAPSULE, LIQUID FILLED ORAL at 20:38

## 2019-07-10 RX ADMIN — LISINOPRIL AND HYDROCHLOROTHIAZIDE SCH EACH: 12.5; 1 TABLET ORAL at 18:02

## 2019-07-10 RX ADMIN — PANTOPRAZOLE SODIUM SCH MG: 40 INJECTION, POWDER, FOR SOLUTION INTRAVENOUS at 08:57

## 2019-07-10 RX ADMIN — HALOPERIDOL SCH MG: 5 TABLET ORAL at 08:56

## 2019-07-10 RX ADMIN — BENZTROPINE MESYLATE SCH MG: 0.5 TABLET ORAL at 08:56

## 2019-07-10 RX ADMIN — POTASSIUM CHLORIDE SCH MEQ: 20 TABLET, EXTENDED RELEASE ORAL at 18:02

## 2019-07-10 RX ADMIN — BENZTROPINE MESYLATE SCH MG: 0.5 TABLET ORAL at 18:02

## 2019-07-10 RX ADMIN — PANTOPRAZOLE SODIUM SCH MG: 40 TABLET, DELAYED RELEASE ORAL at 18:02

## 2019-07-10 RX ADMIN — Medication SCH UNIT: at 08:57

## 2019-07-10 RX ADMIN — DEXTROSE MONOHYDRATE, SODIUM CHLORIDE, AND POTASSIUM CHLORIDE SCH MLS/HR: 50; 9; 1.49 INJECTION, SOLUTION INTRAVENOUS at 01:59

## 2019-07-10 RX ADMIN — DEXTROSE MONOHYDRATE, SODIUM CHLORIDE, AND POTASSIUM CHLORIDE SCH MLS/HR: 50; 9; 1.49 INJECTION, SOLUTION INTRAVENOUS at 14:00

## 2019-07-10 RX ADMIN — AMANTADINE HYDROCHLORIDE SCH MG: 100 CAPSULE, LIQUID FILLED ORAL at 08:57

## 2019-07-10 NOTE — P.CONS
History of Present Illness





- Reason for Consult


Consult date: 07/10/19


Anemia possible GI bleed


Requesting physician: Waqas George





- Chief Complaint


Weakness





- History of Present Illness


71-year-old past medical history chronic normocytic anemia, tardive dyskinesia 

schizophrenic Public guardian with a history of rhabdomyolysis brought to hosp

ital regarding general weakness status post fall from the [facility] with evidence of 

rhabdomyolysis.  Consult requested for anemia possible GI bleed.  Patient's 

average hemoglobin tends to be in the 10 range.  Admission he will 6.2 without 

blood transfusion repeat hemoglobin 10.1.  Platelet 165.  MCV 96.  No evidence 

of hypochromia.  White count 7.5.  BUN 16.  Creatinine 0.6.  CK 2049.  Hand x-

ray no fracture.





History pain from nursing staff medical records as patient is not able to 

provide a detailed medical history.  According to the nursing staff no episodes 

of hematemesis hematochezia or melena.  She is tolerating a diet.  She appears 

not to be in pain.  No emesis.








Is unclear whether or not patient has had an EGD colonoscopy in the past.








Review of Systems


Unable to obtain medical records reviewed





Constitutional: Denies fever, chills, sweats, weight gain, or loss.


HEENT: Negative for migraines, blurred vision or loss, earaches, drainage, 

tinnitus, oral mucosal lesions, dysphagia, or odynophagia.


CARDIAC: Negative for chest pain, arrhythmias, or palpitation.


RESPIRATORY: Negative for shortness of breath, hemoptysis, cough, or sputum p

roduction.


GI: See HPI for pertinent findings.


: Negative for hematuria, urgency, frequency, polyuria, or dysuria.


GYNc: Negative vaginal discharge.


MUSCULOSKELETAL: Negative for muscle aches, swelling, arthritis, and 

arthralgias.  


NEUROLOGIC: Tardive dyskinesia.  Negative for stroke or TIA.


ENDOCRINE: Negative for thyroid problems.


SKIN: Negative for rash or itching.


PSYCHIATRIC: Schizophrenia








Past Medical History


Past Medical History: Hypertension


History of Any Multi-Drug Resistant Organisms: None Reported


Past Surgical History: No Surgical Hx Reported


Smoking Status: Never smoker





- Past Family History


  ** Father


Family Medical History: CVA/TIA, Diabetes Mellitus





Medications and Allergies


                                Home Medications











 Medication  Instructions  Recorded  Confirmed  Type


 


Benztropine Mesylate 0.5 mg PO TID 04/20/19 07/09/19 History


 


Haloperidol [Haldol] 5 mg PO DAILY 04/20/19 07/09/19 History


 


Amantadine HCl [Amantadine] 100 mg PO BID 06/03/19 07/09/19 History


 


QUEtiapine FUMARATE [SEROquel] 300 mg PO HS 06/03/19 07/09/19 History


 


Cholecalciferol [Vitamin D3 (25 1,000 unit PO DAILY 07/09/19 07/09/19 History





Mcg = 1000 Iu)]    


 


Ferrous Sulfate [Feosol] 325 mg PO DAILY 07/09/19 07/09/19 History


 


Pantoprazole [Protonix] 40 mg PO DAILY 07/09/19 07/09/19 History








                                    Allergies











Allergy/AdvReac Type Severity Reaction Status Date / Time


 


No Known Allergies Allergy   Verified 07/09/19 16:22














Physical Exam


Vitals: 


                                   Vital Signs











  Temp Pulse Pulse Resp BP BP Pulse Ox


 


 07/10/19 08:00  97.8 F   74  18   135/63  100


 


 07/10/19 04:30  97.8 F   81  16   141/72  99


 


 07/10/19 00:30  97.7 F   74  16   146/66  100


 


 07/09/19 21:35    100  17   


 


 07/09/19 21:09  97.9 F   100  17   174/84  98


 


 07/09/19 20:58   93   18  168/92   95


 


 07/09/19 20:34   98   18  161/103  


 


 07/09/19 20:09  98.6 F  100   18  177/98   99


 


 07/09/19 19:10  98.4 F  94   18  178/101   96


 


 07/09/19 16:28  99.2 F  88   17  176/99   96








                                Intake and Output











 07/09/19 07/10/19 07/10/19





 22:59 06:59 14:59


 


Intake Total 120 1100 240


 


Output Total  1200 


 


Balance 120 -100 240


 


Intake:   


 


  Intake, IV Titration  1100 





  Amount   


 


    Sodium Chloride 0.9% 1,  1100 





    000 ml @ 100 mls/hr IV .   





    Q10H STA Rx#:[id number]   


 


  Oral 120  240


 


Output:   


 


  Urine  1200 


 


Other:   


 


  Voiding Method  Indwelling Catheter Indwelling Catheter


 


  Weight 54.431 kg 54.5 kg 54.5 kg











General appearance: The patient is alert, in no acute distress.


HET: Head is normocephalic and atraumatic.  Pupils are equal and reactive.  

Oropharynx is clear without lesions.


Neck: Supple without lymphadenopathy.  Trachea midline.


Heart: S1 S2.  Regular rate and rhythm.


Lungs: No crackles or wheezes are heard.


Abdomen: Soft, nontender, nondistended with  bowel sounds.  No peritoneal signs.

  No palpable organomegaly or masses.


Extremities: Normal skin color and turgor.  No cyanosis, rash, ulceration, 

clubbing, or edema.  Radial and pedal pulses are 2/4 bilaterally.


Neurological: Tardive dyskinesia.  Strength and sensation are grossly intact.








Results


CBC & Chem 7: 


                                 07/10/19 06:08





                                 07/10/19 06:08


Labs: 


                  Abnormal Lab Results - Last 24 Hours (Table)











  07/09/19 07/09/19 07/09/19 Range/Units





  17:11 17:30 18:27 


 


RBC    2.01 L  (3.80-5.40)  m/uL


 


Hgb    6.2 L* D  (11.4-16.0)  gm/dL


 


Hct    19.1 L*  (34.0-46.0)  %


 


Plt Count    102 L D  (150-450)  k/uL


 


Lymphocytes #    0.9 L  (1.0-4.8)  k/uL


 


Potassium     (3.5-5.1)  mmol/L


 


Chloride     ()  mmol/L


 


BUN  23 H    (7-17)  mg/dL


 


Glucose  114 H    (74-99)  mg/dL


 


Calcium     (8.4-10.2)  mg/dL


 


AST  69 H    (14-36)  U/L


 


Creatine Kinase  2049 H*    ()  U/L


 


Urine Appearance   Cloudy H   (Clear)  


 


Urine Protein   1+ H   (Negative)  


 


Urine Ketones   1+ H   (Negative)  


 


Urine Blood   Trace H   (Negative)  


 


Ur Leukocyte Esterase   Large H   (Negative)  


 


Urine WBC   139 H   (0-5)  /hpf


 


Urine WBC Clumps   Few H   (None)  /hpf


 


Amorphous Sediment   Rare H   (None)  /hpf


 


Urine Mucus   Rare H   (None)  /hpf














  07/10/19 07/10/19 Range/Units





  06:08 06:08 


 


RBC  3.19 L   (3.80-5.40)  m/uL


 


Hgb  10.1 L D   (11.4-16.0)  gm/dL


 


Hct  30.9 L   (34.0-46.0)  %


 


Plt Count    (150-450)  k/uL


 


Lymphocytes #    (1.0-4.8)  k/uL


 


Potassium   3.2 L  (3.5-5.1)  mmol/L


 


Chloride   111 H  ()  mmol/L


 


BUN    (7-17)  mg/dL


 


Glucose   131 H  (74-99)  mg/dL


 


Calcium   8.3 L  (8.4-10.2)  mg/dL


 


AST    (14-36)  U/L


 


Creatine Kinase   1131 H*  ()  U/L


 


Urine Appearance    (Clear)  


 


Urine Protein    (Negative)  


 


Urine Ketones    (Negative)  


 


Urine Blood    (Negative)  


 


Ur Leukocyte Esterase    (Negative)  


 


Urine WBC    (0-5)  /hpf


 


Urine WBC Clumps    (None)  /hpf


 


Amorphous Sediment    (None)  /hpf


 


Urine Mucus    (None)  /hpf








                      Microbiology - Last 24 Hours (Table)











 07/09/19 17:30 Urine Culture - Preliminary





 Urine,Voided 














Assessment and Plan


(1) Rhabdomyolysis


Narrative/Plan: 


73-year-old female history schizophrenia admitted from [facility] with weakness status 

post fall rhabdomyolysis with a baseline history of normocytic normochromic 

anemia without clinical evidence to suggest an acute GI bleed at this time.  

Patient had admission hemoglobin 6.2 repeat hemoglobin 10.1 without transfusion.

  Admission CBC most likely false result.


Current Visit: Yes   Status: Acute   Code(s): M62.82 - RHABDOMYOLYSIS   SNOMED 

Code(s): 231085871


   





(2) Weakness


Current Visit: Yes   Status: Acute   Code(s): R53.1 - WEAKNESS   SNOMED Code(s):

 11371455


   





(3) Fall


Current Visit: No   Status: Acute   Code(s): W19.XXXA - UNSPECIFIED FALL, 

INITIAL ENCOUNTER   SNOMED Code(s): 7959694


   





(4) Schizophrenia


Current Visit: No   Status: Acute   Priority: High   Code(s): F20.9 - 

SCHIZOPHRENIA, UNSPECIFIED   SNOMED Code(s): 47953291


   


Plan: 


1.  From a GI standpoint inpatient endoscopic exams are not recommended at this 

time.  Clinically she is not showing signs of an active GI bleed.  Her admission

 CBC most likely false secondary to repeat CBC returning and 10.1 without trans

fusion which is close to her baseline.  If patient develops clinical symptoms of

 acute GI bleed we'll proceed with endoscopic exams accordingly.  Continue with 

supportive measures.  Diet as tolerated.  CBC monitoring.








Thank you for this kind referral and the opportunity to participate in the care 

of your patient.  This consultation was discussed with Dr. Gong.  The impressio

n and plan of care have been directed as dictated.

## 2019-07-10 NOTE — P.PN
Subjective


Progress Note Date: 07/10/19


Progress note date of service 07/10/2019.


Patient seen today evaluated face-to-face


Laboratory has been reviewed and found that his white count 7.5 and the 

hemoglobin 10.1 with the yesterday hemoglobin 6.2 was critical at that time 

however patient did not have blood transfusion and today her hemoglobin is 10.1 

probably due to falls result from the lab however patient still anemic and call 

and pale and GI consultation was reviewed and the indicating there is no obvious

point of GI bleeding.  Patient however is anemic and her platelet count also 

improved to 165 from 102.  And we will be starting her on subcu Lovenox daily 

for DVT prophylaxis.


Her sodium is 141 potassium 3.2 with the underlying hypokalemia and covered with

protocol as well as IV has potassium and will lab tomorrow as well and her ca

lcium is 8.3 today her renal function is stable with the non- glomerular 

filtration rate 89 and the serum creatinine 0.63 and the BUN is 16.


Urinary tract infection has been treated with soft Rocephin and we planning to 

wait for the culture as well with the recurrent urinary tract infection and 

retention of the urine indicating possibility of consideration of cystitis as 

well and antibiotic was adjusted with the Rocephin 1 g every 12 hours until the 

culture is available.


No evidence of blood from any orifices as her from oral or from the bowel 

movement however patient did not have yet a stool for Hemoccult done.


Her temperature 98.8 and pulse 72 respiratory rate 18 and blood pressure 163/88 

and her her pulse ox 99.  On the exam


Her HEENT was negative and she had difficulty of expressing herself and we 

consulted the neurology to evaluate as well her ulnar pulsating versus radial pu

lse rate associated with previous fall and when that that time admitted to the 

hospital there is no neurologist on-call, at this time we will be consulting 

neurologist to evaluate and give us clearance on which diagnosis is that and how

can need for rehabilitation as well.


I did discuss with the discharge planning and the nursing staff that the patient

needs complete dependency on the nursing home as she could not really use her 

left hand and has difficulties of walking as well and the for the purposes we 

recommended the placement in the nursing home and as well as rehabilitation Rufino


On the examination patient is conscious she is alert but she has difficulty with

speech with the tardive dyskinesia secondary to the hold all that was used for 

her schizophrenia and the psychiatry consultant did not remove that medication 

because necessitated for her treatment Slominski Zofran anemia.  Patient as well

has been seen by  the psychiatrist in the hospital and on last admission

and at that time she also discuss it with Dr. Pena the psychiatrist of 

outpatient and they decided patient has to be independent "incomplete 

supervision and that they need for nursing home is mandated and to be in the 

nursing home which patient has been agreed 1 that as well and I did not see that

her brother kept her there in the nursing home and the stating that the nursing 

home sending the patient out which is confused point from [facility].


On the examination the chest was clear and the heart was regular sinus rhythm 

and the abdomen was soft positive bowel sound extremities had edema of the left 

lower extremities has been resolved, and with no arrhythmias and the blood 

pressure is fairly well controlled and which is fluctuating but lost blood 

pressure was 135/63 with a mean 87.


Patient may be transferred to a [facility] and there is no need for telemetry on 

this admission however the discharge planner will be working with the DPMOISÉSE her 

brother and to have permanent position placement in the nursing home as this 

admission has the same frequency of she was in the wheelchair and fell down and 

causing the rhabdomyolysis as well as well as her bruises in the left hand with 

the present and she could not move it and they did x-ray in the ER was negative.





Assessment #1 is rhabdomyolysis secondary to frequent fall.  #2 edema of the 

left lower extremities resolved #3 urinary tract infection, #4 urinary retention

with discomfort on the bladder was palpation with cystitis rule out sepsis.  #5 

hypertension uncontrolled.  With the underlying IV hydration for rhabdomyolysis.

 #6 inability to walk.





Plan rehabilitation with the PT and OT.  #2 treatment for the ransacked 

infection waiting for the culture and sensitivity.  #3 the underlying anemia of 

unknown etiology with the sofar GI blood loss was not present and with the 

laboratory error on admission with the recovery of her hemoglobin to 10 with the

probably underlying chronic anemia this considered.


#4 we'll repeat laboratory tomorrow for CBC and BMP and the CK creatinine kinase




And for further treatment with the neurology consultation for the ulnar palsy of

the left hand versus other etiology and no fracture of the and for the rest of 

the left hand.


And future plan for placement to the nursing home [facility] with the c

onsideration of continuing rehabilitation.  Patient is dependent and need 

placement to live in the nursing home.  I did discuss it with the discharge 

nurse





Objective





- Vital Signs


Vital signs: 


                                   Vital Signs











Temp  98.9 F   07/10/19 12:00


 


Pulse  72   07/10/19 12:00


 


Resp  18   07/10/19 12:00


 


BP  163/88   07/10/19 12:00


 


Pulse Ox  99   07/10/19 12:00








                                 Intake & Output











 07/09/19 07/10/19 07/10/19





 18:59 06:59 18:59


 


Intake Total  1220 480


 


Output Total  1200 


 


Balance  20 480


 


Weight 54.431 kg 54.5 kg 54.5 kg


 


Intake:   


 


  Intake, IV Titration  1100 





  Amount   


 


    Sodium Chloride 0.9% 1,  1100 





    000 ml @ 100 mls/hr IV .   





    Q10H STA Rx#:[id number]   


 


  Oral  120 480


 


Output:   


 


  Urine  1200 


 


Other:   


 


  Voiding Method  Indwelling Catheter Indwelling Catheter














- Labs


CBC & Chem 7: 


                                 07/10/19 06:08





                                 07/10/19 06:08


Labs: 


                  Abnormal Lab Results - Last 24 Hours (Table)











  07/09/19 07/09/19 07/09/19 Range/Units





  17:11 17:30 18:27 


 


RBC    2.01 L  (3.80-5.40)  m/uL


 


Hgb    6.2 L* D  (11.4-16.0)  gm/dL


 


Hct    19.1 L*  (34.0-46.0)  %


 


Plt Count    102 L D  (150-450)  k/uL


 


Lymphocytes #    0.9 L  (1.0-4.8)  k/uL


 


Potassium     (3.5-5.1)  mmol/L


 


Chloride     ()  mmol/L


 


BUN  23 H    (7-17)  mg/dL


 


Glucose  114 H    (74-99)  mg/dL


 


Calcium     (8.4-10.2)  mg/dL


 


AST  69 H    (14-36)  U/L


 


Creatine Kinase  2049 H*    ()  U/L


 


Urine Appearance   Cloudy H   (Clear)  


 


Urine Protein   1+ H   (Negative)  


 


Urine Ketones   1+ H   (Negative)  


 


Urine Blood   Trace H   (Negative)  


 


Ur Leukocyte Esterase   Large H   (Negative)  


 


Urine WBC   139 H   (0-5)  /hpf


 


Urine WBC Clumps   Few H   (None)  /hpf


 


Amorphous Sediment   Rare H   (None)  /hpf


 


Urine Mucus   Rare H   (None)  /hpf














  07/10/19 07/10/19 Range/Units





  06:08 06:08 


 


RBC  3.19 L   (3.80-5.40)  m/uL


 


Hgb  10.1 L D   (11.4-16.0)  gm/dL


 


Hct  30.9 L   (34.0-46.0)  %


 


Plt Count    (150-450)  k/uL


 


Lymphocytes #    (1.0-4.8)  k/uL


 


Potassium   3.2 L  (3.5-5.1)  mmol/L


 


Chloride   111 H  ()  mmol/L


 


BUN    (7-17)  mg/dL


 


Glucose   131 H  (74-99)  mg/dL


 


Calcium   8.3 L  (8.4-10.2)  mg/dL


 


AST    (14-36)  U/L


 


Creatine Kinase   1131 H*  ()  U/L


 


Urine Appearance    (Clear)  


 


Urine Protein    (Negative)  


 


Urine Ketones    (Negative)  


 


Urine Blood    (Negative)  


 


Ur Leukocyte Esterase    (Negative)  


 


Urine WBC    (0-5)  /hpf


 


Urine WBC Clumps    (None)  /hpf


 


Amorphous Sediment    (None)  /hpf


 


Urine Mucus    (None)  /hpf








                      Microbiology - Last 24 Hours (Table)











 07/09/19 17:30 Urine Culture - Preliminary





 Urine,Voided

## 2019-07-11 LAB
BASOPHILS # BLD AUTO: 0.1 K/UL (ref 0–0.2)
BASOPHILS NFR BLD AUTO: 1 %
EOSINOPHIL # BLD AUTO: 0.4 K/UL (ref 0–0.7)
EOSINOPHIL NFR BLD AUTO: 7 %
ERYTHROCYTE [DISTWIDTH] IN BLOOD BY AUTOMATED COUNT: 3.44 M/UL (ref 3.8–5.4)
ERYTHROCYTE [DISTWIDTH] IN BLOOD: 13.6 % (ref 11.5–15.5)
HCT VFR BLD AUTO: 32.1 % (ref 34–46)
HGB BLD-MCNC: 11.1 GM/DL (ref 11.4–16)
LYMPHOCYTES # SPEC AUTO: 1.1 K/UL (ref 1–4.8)
LYMPHOCYTES NFR SPEC AUTO: 18 %
MCH RBC QN AUTO: 32.2 PG (ref 25–35)
MCHC RBC AUTO-ENTMCNC: 34.6 G/DL (ref 31–37)
MCV RBC AUTO: 93.2 FL (ref 80–100)
MONOCYTES # BLD AUTO: 0.5 K/UL (ref 0–1)
MONOCYTES NFR BLD AUTO: 8 %
NEUTROPHILS # BLD AUTO: 3.9 K/UL (ref 1.3–7.7)
NEUTROPHILS NFR BLD AUTO: 64 %
PLATELET # BLD AUTO: 167 K/UL (ref 150–450)
WBC # BLD AUTO: 6.1 K/UL (ref 3.8–10.6)

## 2019-07-11 RX ADMIN — Medication SCH UNIT: at 09:47

## 2019-07-11 RX ADMIN — BENZTROPINE MESYLATE SCH MG: 0.5 TABLET ORAL at 21:17

## 2019-07-11 RX ADMIN — AMANTADINE HYDROCHLORIDE SCH MG: 100 CAPSULE, LIQUID FILLED ORAL at 21:18

## 2019-07-11 RX ADMIN — BENZTROPINE MESYLATE SCH MG: 0.5 TABLET ORAL at 09:47

## 2019-07-11 RX ADMIN — AMANTADINE HYDROCHLORIDE SCH MG: 100 CAPSULE, LIQUID FILLED ORAL at 09:47

## 2019-07-11 RX ADMIN — HALOPERIDOL SCH MG: 5 TABLET ORAL at 09:48

## 2019-07-11 RX ADMIN — PANTOPRAZOLE SODIUM SCH MG: 40 TABLET, DELAYED RELEASE ORAL at 06:39

## 2019-07-11 RX ADMIN — PANTOPRAZOLE SODIUM SCH MG: 40 TABLET, DELAYED RELEASE ORAL at 17:07

## 2019-07-11 RX ADMIN — DEXTROSE MONOHYDRATE, SODIUM CHLORIDE, AND POTASSIUM CHLORIDE SCH MLS/HR: 50; 9; 1.49 INJECTION, SOLUTION INTRAVENOUS at 09:50

## 2019-07-11 RX ADMIN — DEXTROSE MONOHYDRATE, SODIUM CHLORIDE, AND POTASSIUM CHLORIDE SCH MLS/HR: 50; 9; 1.49 INJECTION, SOLUTION INTRAVENOUS at 05:40

## 2019-07-11 RX ADMIN — LISINOPRIL AND HYDROCHLOROTHIAZIDE SCH EACH: 12.5; 1 TABLET ORAL at 09:47

## 2019-07-11 RX ADMIN — BENZTROPINE MESYLATE SCH MG: 0.5 TABLET ORAL at 17:06

## 2019-07-11 NOTE — P.PN
Subjective


Progress Note Date: 07/11/19


Principal diagnosis: 





anemia possible GI bleed


No active GI bleeding.  Hemoglobin 11.1 up from 10.1 yesterday.








Objective





- Vital Signs


Vital signs: 


                                   Vital Signs











Temp  98.6 F   07/10/19 20:00


 


Pulse  100   07/10/19 20:00


 


Resp  18   07/10/19 20:00


 


BP  179/81   07/10/19 20:00


 


Pulse Ox  98   07/10/19 20:00








                                 Intake & Output











 07/10/19 07/11/19 07/11/19





 18:59 06:59 18:59


 


Intake Total 702  


 


Output Total 500 900 


 


Balance 202 -900 


 


Weight 54.5 kg  


 


Intake:   


 


  Oral 702  


 


Output:   


 


  Urine 500 900 


 


Other:   


 


  Voiding Method Indwelling Catheter Indwelling Catheter 














- Exam


General appearance: The patient is alert, in no acute distress.  Tardive 

dyskinesia.


HET: Head is normocephalic and atraumatic.  Pupils are equal and reactive.  

Oropharynx is clear without lesions.


Neck: Supple without lymphadenopathy.  Trachea midline.


Heart: S1 S2.  Regular rate and rhythm.


Lungs: No crackles or wheezes are heard.


Abdomen: Soft, nontender, nondistended with  bowel sounds.  No peritoneal signs.

 No palpable organomegaly or masses.


Extremities: No edema.


Neurological: Tardive dyskinesia.





- Labs


CBC & Chem 7: 


                                 07/11/19 06:43





                                 07/10/19 06:08


Labs: 


                  Abnormal Lab Results - Last 24 Hours (Table)











  07/11/19 07/11/19 Range/Units





  06:43 06:43 


 


RBC  3.44 L   (3.80-5.40)  m/uL


 


Hgb  11.1 L   (11.4-16.0)  gm/dL


 


Hct  32.1 L   (34.0-46.0)  %


 


Creatine Kinase   411 H  ()  U/L








                      Microbiology - Last 24 Hours (Table)











 07/09/19 23:05 Blood Culture - Preliminary





 Blood    No Growth after 24 hours


 


 07/09/19 22:14 Blood Culture - Preliminary





 Blood    No Growth after 24 hours


 


 07/09/19 17:30 Urine Culture - Preliminary





 Urine,Voided    Gram Neg Bacilli














Assessment and Plan


(1) Rhabdomyolysis


Narrative/Plan: 


73-year-old female history schizophrenia admitted from [facility] with weakness status 

post fall rhabdomyolysis with a baseline history of normocytic normochromic anem

ia without clinical evidence to suggest an acute GI bleed at this time.  Patient

had admission hemoglobin 6.2 repeat hemoglobin 10.1 and presently 11.1 without 

transfusion.  Admission CBC most likely false result.


Current Visit: Yes   Status: Acute   Code(s): M62.82 - RHABDOMYOLYSIS   SNOMED 

Code(s): 318354789


   





(2) Weakness


Current Visit: Yes   Status: Acute   Code(s): R53.1 - WEAKNESS   SNOMED Code(s):

30137502


   





(3) Fall


Current Visit: No   Status: Acute   Code(s): W19.XXXA - UNSPECIFIED FALL, 

INITIAL ENCOUNTER   SNOMED Code(s): 9417437


   





(4) Schizophrenia


Current Visit: No   Status: Acute   Priority: High   Code(s): F20.9 - 

SCHIZOPHRENIA, UNSPECIFIED   SNOMED Code(s): 40064969


   


Plan: 


1.  From a GI standpoint inpatient endoscopic exams are not recommended at this 

time.  Clinically she is not showing signs of an active GI bleed.  Her admission

CBC most likely false secondary to repeat CBC returning and 10.1 now 11.1 

without transfusion.  We'll follow on an as-needed basis.








Assessment and plan a care discussed with Dr. Gong

## 2019-07-11 NOTE — CT
EXAMINATION TYPE: CT brain wo con

 

DATE OF EXAM: 7/11/2019

 

COMPARISON: 6/3/2019

 

HISTORY: encephalopathy

 

CT DLP: 1158.4 mGycm

Automated exposure control for dose reduction was used.

 

FINDINGS: 

There is some cerebral cortical atrophy. There is no mass effect nor midline shift. There is no sign 
of intracranial hemorrhage. The calvarium is intact.

 

IMPRESSION: 

CEREBRAL ATROPHY. NO ACUTE INTRACRANIAL ABNORMALITY. NO CHANGE.

## 2019-07-11 NOTE — P.PN
Subjective


Progress Note Date: 07/11/19





Progress note dictation date of service 07/11/2019.


Patient seen and evaluated face-to-face.


Her brother did see her today and agreeable for the nursing home transfer with 

the plan for tomorrow after seen by the neurologist for evaluation of her left 

hand drop with the underlying unknown etiology except that she fell down on her 

last admission..


Her vital signs today temperature 98.2 here heart rate 100 respiratory rate 18/m

and nonlabored.





Blood pressure 129/63 with a mean 85 and her oxygen saturation 98% on room air.


Physical exam:


HEENT was no change and pupil was equal reactive she is conscious alert oriented

and she has a problem with speech but she is aware of the current changes and 

she stated that she seen her brother Came and visited her in the hospital.


The patient will be going for rehab as he is aware of that in [facility] and 

subsequently he may arrangement in the [facility] to be placed today 

her and she need complete supervision due to effect that her left hand and 

pulsating as well as she is unable to ambulate.  And she needs sometimes 

assistant for feeding as well.


The oropharynx is normal and able to eat and swallow.  The neck was supple and 

no JVD no thyromegaly no lymphadenopathy trachea midline.


The chest was clear to auscultation and percussion and heart was regular sinus 

rhythm.


Abdomen was soft positive bowel sounds no tenderness.


Extremities: No edema in the left lower extremities resolved and pulses was 

intact.  And her left upper and with the splint and no evidence of fracture by 

the x-ray however she had wrist drop with the subtotally home on all normal 

pulsating however we are not clear with her current medication and we consulted 

the neurologist however the did not see her yes yet and we will hopefully 

reminding them again to see her today.


Laboratories:


White count today 6.1 hemoglobin 11.1 and hematocrit 32.1.  And her platelet 

count is 167 stable and her potassium is 4.1 corrected as well as we have today 

the creatinine kinase 411 which is significant improvement from yesterday which 

is 1131 with the progression of of improvement


Blood culture was negative.


Urine culture indicating gram-negative bacilli however we don't have the 

sensitivity or identification yet.  And we continued with the Rocephin.


No evidence of sepsis her temperature has been normal as well as her white count

no acute exacerbation of the white count with the consideration of cystitis is 

considered.  However the urine analysis of the time on 07/09/2019 was indicating

large leukocyte and negative nitrite and she had at that time 1+ ketone.


Neurologically: Waiting for the neurology assessment.


Psychiatry she had underlying extrapyramidal symptoms with lip masking and with 

the tardive dyskinesia and mild improvement of the speech.


Rehabilitation has been consulted and she has difficulty of ambulation as well 

as full risk.





Assessment:


#1 risk of fall recurrent.


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


#3 urinary tract infection, cystitis, gram negative bacilli, negative blood 

culture, negative sepsis.


#4 hypertension controlled.


#5 walking disability and need for rehabilitation.


#6 schizophrenia with the associated treatment and the adverse effect for 

extrapyramidal signs.





Plan:


#1 continue rehabilitation.





#2 plan for transfer to [facility] and rehab also a tomorrow.


#3 consultation with the neurology and I did discuss it with NASH Alexander to notify 

the neurology for the consult for evaluation of her left hand drop with the 

unclear etiology.


#4 we'll continue the current medication on discharge to nursing home.  And we 

plan for discharge tomorrow if the bed is available.  We'll obtain tomorrow 

creatinine kinase in a.m.


#5 the urine culture and sensitivity is not available hopefully will be 

available by tomorrow.





Objective





- Vital Signs


Vital signs: 


                                   Vital Signs











Temp  98.2 F   07/11/19 08:00


 


Pulse  100   07/11/19 08:00


 


Resp  18   07/11/19 11:55


 


BP  129/63   07/11/19 08:00


 


Pulse Ox  98   07/11/19 08:00








                                 Intake & Output











 07/10/19 07/11/19 07/11/19





 18:59 06:59 18:59


 


Intake Total 702  


 


Output Total 500 900 


 


Balance 202 -900 


 


Weight 54.5 kg  


 


Intake:   


 


  Oral 702  


 


Output:   


 


  Urine 500 900 


 


Other:   


 


  Voiding Method Indwelling Catheter Indwelling Catheter Indwelling Catheter














- Labs


CBC & Chem 7: 


                                 07/11/19 06:43





                                 07/11/19 06:43


Labs: 


                  Abnormal Lab Results - Last 24 Hours (Table)











  07/11/19 07/11/19 Range/Units





  06:43 06:43 


 


RBC  3.44 L   (3.80-5.40)  m/uL


 


Hgb  11.1 L   (11.4-16.0)  gm/dL


 


Hct  32.1 L   (34.0-46.0)  %


 


Creatine Kinase   411 H  ()  U/L








                      Microbiology - Last 24 Hours (Table)











 07/09/19 23:05 Blood Culture - Preliminary





 Blood    No Growth after 24 hours


 


 07/09/19 22:14 Blood Culture - Preliminary





 Blood    No Growth after 24 hours


 


 07/09/19 17:30 Urine Culture - Preliminary





 Urine,Voided    Gram Neg Bacilli

## 2019-07-12 VITALS
TEMPERATURE: 97.8 F | DIASTOLIC BLOOD PRESSURE: 74 MMHG | HEART RATE: 83 BPM | SYSTOLIC BLOOD PRESSURE: 130 MMHG | RESPIRATION RATE: 15 BRPM

## 2019-07-12 LAB
ERYTHROCYTE [DISTWIDTH] IN BLOOD BY AUTOMATED COUNT: 3.24 M/UL (ref 3.8–5.4)
ERYTHROCYTE [DISTWIDTH] IN BLOOD: 14 % (ref 11.5–15.5)
HCT VFR BLD AUTO: 30.3 % (ref 34–46)
HGB BLD-MCNC: 10.1 GM/DL (ref 11.4–16)
MCH RBC QN AUTO: 31.2 PG (ref 25–35)
MCHC RBC AUTO-ENTMCNC: 33.4 G/DL (ref 31–37)
MCV RBC AUTO: 93.5 FL (ref 80–100)
PLATELET # BLD AUTO: 151 K/UL (ref 150–450)
VIT B12 SERPL-MCNC: 594 PG/ML (ref 200–944)
WBC # BLD AUTO: 6 K/UL (ref 3.8–10.6)

## 2019-07-12 RX ADMIN — PANTOPRAZOLE SODIUM SCH MG: 40 TABLET, DELAYED RELEASE ORAL at 06:28

## 2019-07-12 RX ADMIN — DEXTROSE MONOHYDRATE, SODIUM CHLORIDE, AND POTASSIUM CHLORIDE SCH MLS/HR: 50; 9; 1.49 INJECTION, SOLUTION INTRAVENOUS at 01:14

## 2019-07-12 RX ADMIN — Medication SCH UNIT: at 09:10

## 2019-07-12 RX ADMIN — LISINOPRIL AND HYDROCHLOROTHIAZIDE SCH EACH: 12.5; 1 TABLET ORAL at 09:11

## 2019-07-12 RX ADMIN — BENZTROPINE MESYLATE SCH MG: 0.5 TABLET ORAL at 09:11

## 2019-07-12 RX ADMIN — AMANTADINE HYDROCHLORIDE SCH MG: 100 CAPSULE, LIQUID FILLED ORAL at 09:11

## 2019-07-12 RX ADMIN — HALOPERIDOL SCH MG: 5 TABLET ORAL at 09:11

## 2019-07-12 NOTE — CONS
CONSULTATION



DATE OF CONSULTATION:

07/11/2019



REFERRING PHYSICIAN:

Dr. George.



HISTORY OF PRESENT ILLNESS:

Thank you for allowing me to evaluate Maria Esther Presaud who is a 75-year-old right-handed

white female who presented to [facility] on 07/09/2019 after being found beside

her wheelchair by a caregiver.  The patient is a limited historian, but states that she

sustained the fall which was unwitnessed in her apartment and could not get herself

back up.  It is unclear how long the patient laid on the floor.  She states she did not

strike her head and currently denies headache.  The patient is being seen from a

neurologic standpoint for evaluation of mental status changes and "left hand palsy" .

The patient currently states that her mentation has improved and denied vertigo,

diplopia, dysarthria, or focal weakness/numbness in the extremities associated with

this event.  She denies previous history of stroke or seizure.  Today the patient

states she is doing "pretty good".



The patient was previously admitted to [facility] between 06/03 and 06/07/2019,

at which time she had also sustained a fall and apparently laid on the floor for an

extended period of time.  The patient was found lying on her left side, obtunded and

diagnosed with rhabdomyolysis, similar to this hospitalization. On 06/07/2019, the

patient was evaluated by Orthopedics due to identification of left wrist drop, which

was diagnosed as a left radial nerve palsy.  The patient was provided a brace which she

is currently wearing.



ALLERGIES:

No known allergies.



HOME MEDICATIONS:

Protonix, Feosol, vitamin D, Seroquel 300 mg q.h.s., Haldol 5 mg q. day, Cogentin 0.5

mg t.i.d., and amantadine 100 mg b.i.d.



PAST MEDICAL HISTORY:

Schizophrenia (the patient has had previous mental health unit admissions) and

hypertension.



PAST SURGICAL HISTORY:

Ganglion cyst removed from the left hand, tonsillectomy and appendectomy.



SOCIAL HISTORY:

The patient denies tobacco or alcohol use.  She is single without children and states

that she lives at a facility named "[facility]".  The patient states she is

wheelchair bound.



FAMILY HISTORY:

Noncontributory.



REVIEW OF SYSTEMS:

Fourteen systems are reviewed and no additional points are identified.  The review of

systems documented in history and physical.



PHYSICAL EXAMINATION:

Upon my arrival to the patient's room, she was sitting up in bed, receptive to the

examiner.  Affect is flat.  She is a fair to poor historian. There are no family

members at the bedside.  The patient appears chronically ill, deconditioned and of

stated age.  She has facial masking, reduced frequency of blinking, a chin tremor and

bilateral upper extremity rest tremors, slightly more prominent on the right.

VITAL SIGNS: Blood pressure is 132/64 with a pulse of 92, respiratory rate 18,

temperature 97.5, weight is 54.5 kg on a 5-foot 8-inch frame.

SKIN AND EXTREMITIES: The patient has left wrist drop and distal left upper extremity

is in a brace.  Chronic skin changes are noted in the distal lower extremities.

HEAD AND NECK: No signs of trauma.  Neck is supple without meningeal signs.  Bruits are

difficult to auscultate as the patient was speaking while I was auscultating.

HEART:  Regular rate and rhythm.



HIGHER CORTICAL FUNCTION:

MENTAL STATUS: The patient was alert and oriented to self.  She knew she was in a

hospital in [place].  She knew the floor, year, month and could name the current

president.  She was able to name, repeat and read. There was no right left

disorientation, finger agnosia, extinction to double simultaneous stimulation or

dysarthria.  The patient's speech was hesitant/stuttering at times, which seem to

correlate in part with her chin tremor.  The patient was appropriate and about shelter

through the evaluation, support staff brought in the patient's dinner and she asked if

the meat could be cut and asked the individual to leave the door open when they left.

After the food was brought in, the patient wanted the examiner to leave as she was

interested in eating.  The patient's food was covered and I asked her to give me a few

more minutes, she became quite fixated on eating and after every task was completed.

She asked if I would leave and allow her to eat.



Within these constraints, cranial nerves II through XII: Pupils are equal and reactive

to light symmetrically.  No afferent pupillary defect.  Visual fields are intact to

confrontation.  III, IV, VI: Horizontal extraocular movements were intact, there was a

reduced upgaze but intact downgaze.  V: Pinprick, light touch intact in all 3

divisions.  Motor _____intact.  VII: No facial asymmetry or weakness. VIII: Acuity

intact to finger rub. IX, X: Palate edgard in the midline. XI: Trapezius strength intact.

XII: Tongue protruded midline without fasciculation or atrophy.



MOTOR EXAMINATION: There is no pronator drift.  There is reduced bulk in the hamstring

and intrinsic muscles with increased tone involving the bilateral upper extremities and

bilateral upper extremity rest tremor was noted, which tended to ignacia when the patient

spontaneously moved her arms.  Formal strength testing is limited as the patient was

anxious to eat and wanted the examiner out of the room.  Within these constraints, the

patient did lift both legs off the bed and wiggled the toes symmetrically.  She lifted

both arms off the bed without drift.  Biceps and triceps strength was at least 4+ over

5 bilaterally.  The patient had a clear left wrist drop, but would not allow the

examiner to discretely examine the hand.  She had no wrist or finger drop on the right.



SENSORY: Unreliable.



REFLEXES: Right-sided listed first, biceps 2, 2; brachioradialis 1,1; triceps 1,1;

patella 0, 0;  ankle 0, 0.  Plantar responses flexor bilaterally.  Myers's is absent.



COORDINATION: Finger-to-nose movements are intact.  The patient declined performing

heel-to-shin.  Rapid alternating movements are slowed bilaterally.



DIAGNOSTIC TESTING:

Labs on presentation include a white blood count of 4.6 with a hemoglobin of 6.2

(Today's hemoglobin 11.1).  Initial platelet count of 102, and today is 167.  Sodium

141, potassium 3.2, BUN 16 with a creatinine 0.63, calcium 8.3, magnesium 1.9.  ALT 29,

AST 69.  CPK was initially 2049 and today is 411.  Troponin negative.  Urinalysis

revealed large leukocyte esterase, 139 WBCs, 5 RBCs, negative nitrate.  Chest x-ray

revealed no acute pathology.  Left hand x-ray demonstrated no fracture.  Blood cultures

revealed no growth at 24 hours. Urine culture revealed E coli.



IMPRESSION:

1. Encephalopathy secondary to Escherichia coli  urinary tract infection, improved.

2. Left radial nerve palsy, which has been present since the hospitalization in June 2019 where the patient presented following a fall and apparently laid on her left

    side for an extended period of time.

3. Rhabdomyolysis, likely secondary to lying on the floor, there is no evidence of

    neuroleptic malignant syndrome.  The patient also had rhabdomyolysis in June when

    she was found on the floor for an extended period of time.

4. Anemia/thrombocytopenia, improved.

5. 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

6. Medical history including schizophrenia with previous mental health unit admissions

    and hypertension.



RECOMMENDATION:

1. I discussed my impression with the patient and she expressed understanding.

2. We will obtain a CT of the brain without contrast and lab work for reversible

    causes of cognitive decline.

3. Would consider pursuing an outpatient EMG of the left upper extremity to

    confirm/localize the left radial mononeuropathy and to help prognosticate.

4. Minimize dopamine blocking medications if possible.

5. Treatment of urinary tract infection and evaluation of anemia per primary service.

6. Discharge planning, the patient should have increased supervision and potentially

    fall alert button.

7. If the CT scan of the brain is unrevealing, the patient may be discharged from a

    neurologic standpoint when medically stable.

Thank you for allowing me to participate in the care of your patient.





MMODL / IJN: [id number] / Job#: [id number]

## 2019-07-12 NOTE — P.DS
Providers


Date of admission: 


07/09/19 18:17





Expected date of discharge: 07/12/19 (Left arm radial palsy, rhabdomyolysis, 

UTI, hypertension)


Attending physician: 


Waqas George





Consults: 





                                        





07/10/19 12:58


Consult Physician Routine 


   Consulting Provider: Carlos Alberto Sharma


   Consult Reason/Comments: Left hand palsy/ mental status


   Do you want consulting provider notified?: Yes











Primary care physician: 


Waqas George


This is the diagnosis on discharge.


Umber 1 acute rhabdomyolysis secondary to fall.


#2 patient is dependent and will need definitive placement in a nursing home or 

assisted living with inability to use her left arm and inability to walk.


#3 urinary retention with a Parham catheter placement, UTI with cystitis, no 

evidence of sepsis


#4 underlying anemia of unknown etiology, found laboratory abnormalities due to 

lab data with the hemoglobin recovered on the second day which indicating lab 

her.


#5 walking disability.


#6 schizophrenia, and tardive dyskinesia


#7 left hand radial pulse 80 and need follow-up with the neurology as outpatient

Dr. De La Torre.


Patient presented to the emergency room after she fell down from her wheelchair.

 Found also had hemoglobin 6.2 by the lab in the ER and found also that she had 

rhabdomyolysis similar to previous admission and apparently she went to [facility] in her lost admission however [facility] discharge the patient with the

known that she is dependent.





Laboratory today WBC 6 hemoglobin 10.1 and hematocrit 30.3 and the platelet 

count 151.  Today her creatinine kinase 138 normal and her vitamin B12 594 and 

that T with the normal folic acid 11.8 with a TSH 3.090.


Blood culture no growth 2 after 48 hour period and the urine culture indicating

E. coli and patient treated with Rocephin.  Patient will continue with Augmentin

875 mg twice a day for 3 days.





Examination on discharge to [facility] and rehab.


Vital sign indicating temperature 97.8 pulse 83 regular sinus respiratory rate 

15 normal nonlabored and blood pressure 130/74.  Her pulse ox 97% with no need 

for oxygen.





Clinical examination patient is conscious alert oriented 3.  She had tardive 

dyskinesia with extrapyramidal as well as questionable of Parkinson and the as 

well as has radial pulses he has seen by the neurology in the hospital need 

follow-up with her neurologist as outpatient.  


HEENT negative with patient able to eat and swallow with no difficulties, she 

had difficulty of speech due to tardive dyskinesia and draped basketing due to 

the previous side effect of Haldol with the underlying schizophrenia





Chest is clear to auscultation percussion and the heart was PMI in the fifth 

intercostal space outside midclavicular line with normal S1-S2 no gallop


The abdomen is soft positive bowel sounds no organ enlargement.


Extremities has been reasonable edema of the left lower extremities and she had 

his right lower extremities history of a venous stasis ulcer was treated in the 

past.  She had also difficulties of walking.  The etiology is unknown.  And


Psychiatry schizophrenia and the need also psychiatric evaluation and adjustment

of medication in the nursing home.


Neurology she means a follow-up with Dr. Clark in the neurologist as outpatient.





Assessment stable general condition for discharge tomorrow [place] nursing home 

and rehab.


We will be following her in the nursing home.  And need for further 

rehabilitation. 


Patient Condition at Discharge: Fair





Plan - Discharge Summary


Discharge Rx Participant: No


New Discharge Prescriptions: 


New


   Amoxicillin/Potassium Clav [Augmentin 875-125 Tablet] 1 tab PO BID 3 Days #6 

tab


   amLODIPine [Norvasc] 5 mg PO DAILY  tab


   Lisinopril-Hctz 10-12.5 mg [Zestoretic 10-12.5] 1 each PO DAILY  tab





Continue


   Haloperidol [Haldol] 5 mg PO DAILY


   Benztropine Mesylate 0.5 mg PO TID


   QUEtiapine FUMARATE [SEROquel] 300 mg PO HS


   Amantadine HCl [Amantadine] 100 mg PO BID


   Pantoprazole [Protonix] 40 mg PO DAILY


   Ferrous Sulfate [Iron (65 MG Elemental)] 325 mg PO DAILY


   Cholecalciferol [Vitamin D3 (25 Mcg = 1000 Iu)] 1,000 unit PO DAILY


Discharge Medication List





Benztropine Mesylate 0.5 mg PO TID 04/20/19 [History]


Haloperidol [Haldol] 5 mg PO DAILY 04/20/19 [History]


Amantadine HCl [Amantadine] 100 mg PO BID 06/03/19 [History]


QUEtiapine FUMARATE [SEROquel] 300 mg PO HS 06/03/19 [History]


Cholecalciferol [Vitamin D3 (25 Mcg = 1000 Iu)] 1,000 unit PO DAILY 07/09/19 

[History]


Ferrous Sulfate [Iron (65 MG Elemental)] 325 mg PO DAILY 07/09/19 [History]


Pantoprazole [Protonix] 40 mg PO DAILY 07/09/19 [History]


Amoxicillin/Potassium Clav [Augmentin 875-125 Tablet] 1 tab PO BID 3 Days #6 tab

07/12/19 [Rx]


Lisinopril-Hctz 10-12.5 mg [Zestoretic 10-12.5] 1 each PO DAILY  tab 07/12/19 

[Rx]


amLODIPine [Norvasc] 5 mg PO DAILY  tab 07/12/19 [Rx]








Follow up Appointment(s)/Referral(s): 


Waqas George MD [Primary Care Provider] - 1-2 days


Discharge Disposition: TRANSFER TO SNF/ECF

## 2020-03-10 NOTE — XR
EXAMINATION: XR chest 2V

DATE AND TIME:  7/9/2019 6:09 PM

 

CLINICAL INDICATION: PHH; Weakness

 

TECHNIQUE: Departmental protocol

 

COMPARISON: 4/20/2019

 

FINDINGS: 

The lungs are clear. 

 

The pleural spaces are negative.

 

The cardiac silhouette is not enlarged. 

 

Aortic tortuosity is redemonstrated.

 

The skeletal structures and soft tissues are negative for acute findings.

 

IMPRESSION:

Similar findings.
PROCEDURE: XR hand complete LT - 3V

DATE AND TIME: 7/9/2019 8:52 PM

 

CLINICAL INDICATION: PHH; pain

 

TECHNIQUE: Department protocol

 

COMPARISON: 6/5/2019

 

FINDINGS: 

There is no fracture or malalignment. 

 

There is evidence of soft tissue swelling at the wrist, volar > dorsal.

 

Scattered osteoarthritis changes are noted, with generalized osteopenia. 

 

If clinically necessary, MRI can yield greater sensitivity.

 

IMPRESSION:

No definite acute/subacute radiographic process.
23 or less

## 2020-10-21 ENCOUNTER — HOSPITAL ENCOUNTER (OUTPATIENT)
Dept: HOSPITAL 47 - LABWHC1 | Age: 75
Discharge: HOME | End: 2020-10-21
Attending: THORACIC SURGERY (CARDIOTHORACIC VASCULAR SURGERY)
Payer: MEDICARE

## 2020-10-21 DIAGNOSIS — E55.9: ICD-10-CM

## 2020-10-21 DIAGNOSIS — D64.9: ICD-10-CM

## 2020-10-21 DIAGNOSIS — E87.8: ICD-10-CM

## 2020-10-21 DIAGNOSIS — M81.0: ICD-10-CM

## 2020-10-21 DIAGNOSIS — E78.5: ICD-10-CM

## 2020-10-21 DIAGNOSIS — I87.013: ICD-10-CM

## 2020-10-21 DIAGNOSIS — E03.9: Primary | ICD-10-CM

## 2020-10-21 DIAGNOSIS — E66.9: ICD-10-CM

## 2020-10-21 DIAGNOSIS — E11.65: ICD-10-CM

## 2020-10-21 LAB
ALBUMIN SERPL-MCNC: 4.6 G/DL (ref 3.8–4.9)
ALBUMIN/GLOB SERPL: 1.77 G/DL (ref 1.6–3.17)
ALP SERPL-CCNC: 126 U/L (ref 41–126)
ALT SERPL-CCNC: 17 U/L (ref 8–44)
ANION GAP SERPL CALC-SCNC: 10.5 MMOL/L (ref 4–12)
AST SERPL-CCNC: 21 U/L (ref 13–35)
BASOPHILS # BLD AUTO: 0.1 K/UL (ref 0–0.2)
BASOPHILS NFR BLD AUTO: 1 %
BUN SERPL-SCNC: 15 MG/DL (ref 9–27)
BUN/CREAT SERPL: 15 RATIO (ref 12–20)
CALCIUM SPEC-MCNC: 9.8 MG/DL (ref 8.7–10.3)
CHLORIDE SERPL-SCNC: 104 MMOL/L (ref 96–109)
CHOLEST SERPL-MCNC: 178 MG/DL (ref 0–200)
CK SERPL-CCNC: 108 U/L (ref 26–186)
CO2 SERPL-SCNC: 27.5 MMOL/L (ref 21.6–31.8)
EOSINOPHIL # BLD AUTO: 0.3 K/UL (ref 0–0.7)
EOSINOPHIL NFR BLD AUTO: 4 %
ERYTHROCYTE [DISTWIDTH] IN BLOOD BY AUTOMATED COUNT: 4.18 M/UL (ref 3.8–5.4)
ERYTHROCYTE [DISTWIDTH] IN BLOOD: 12.9 % (ref 11.5–15.5)
FERRITIN SERPL-MCNC: 44.9 NG/ML (ref 10–291)
GLOBULIN SER CALC-MCNC: 2.6 G/DL (ref 1.6–3.3)
GLUCOSE SERPL-MCNC: 104 MG/DL (ref 70–110)
HBA1C MFR BLD: 5.8 % (ref 4–6)
HCT VFR BLD AUTO: 39.5 % (ref 34–46)
HDLC SERPL-MCNC: 78 MG/DL (ref 40–60)
HGB BLD-MCNC: 12.7 GM/DL (ref 11.4–16)
IRON SERPL-MCNC: 44 UG/DL (ref 50–170)
LDLC SERPL CALC-MCNC: 82.8 MG/DL (ref 0–131)
LYMPHOCYTES # SPEC AUTO: 1.5 K/UL (ref 1–4.8)
LYMPHOCYTES NFR SPEC AUTO: 23 %
MAGNESIUM SPEC-SCNC: 2 MG/DL (ref 1.5–2.4)
MCH RBC QN AUTO: 30.5 PG (ref 25–35)
MCHC RBC AUTO-ENTMCNC: 32.2 G/DL (ref 31–37)
MCV RBC AUTO: 94.5 FL (ref 80–100)
MONOCYTES # BLD AUTO: 0.4 K/UL (ref 0–1)
MONOCYTES NFR BLD AUTO: 7 %
NEUTROPHILS # BLD AUTO: 3.9 K/UL (ref 1.3–7.7)
NEUTROPHILS NFR BLD AUTO: 63 %
PLATELET # BLD AUTO: 302 K/UL (ref 150–450)
POTASSIUM SERPL-SCNC: 4 MMOL/L (ref 3.5–5.5)
PROT SERPL-MCNC: 7.2 G/DL (ref 6.2–8.2)
SODIUM SERPL-SCNC: 142 MMOL/L (ref 135–145)
TIBC SERPL-MCNC: 368 UG/DL (ref 228–460)
TRIGL SERPL-MCNC: 86 MG/DL (ref 0–149)
VLDLC SERPL CALC-MCNC: 17.2 MG/DL (ref 5–40)
WBC # BLD AUTO: 6.2 K/UL (ref 3.8–10.6)

## 2020-10-21 PROCEDURE — 80053 COMPREHEN METABOLIC PANEL: CPT

## 2020-10-21 PROCEDURE — 85025 COMPLETE CBC W/AUTO DIFF WBC: CPT

## 2020-10-21 PROCEDURE — 83735 ASSAY OF MAGNESIUM: CPT

## 2020-10-21 PROCEDURE — 85652 RBC SED RATE AUTOMATED: CPT

## 2020-10-21 PROCEDURE — 82728 ASSAY OF FERRITIN: CPT

## 2020-10-21 PROCEDURE — 82306 VITAMIN D 25 HYDROXY: CPT

## 2020-10-21 PROCEDURE — 86140 C-REACTIVE PROTEIN: CPT

## 2020-10-21 PROCEDURE — 83550 IRON BINDING TEST: CPT

## 2020-10-21 PROCEDURE — 84443 ASSAY THYROID STIM HORMONE: CPT

## 2020-10-21 PROCEDURE — 80061 LIPID PANEL: CPT

## 2020-10-21 PROCEDURE — 83036 HEMOGLOBIN GLYCOSYLATED A1C: CPT

## 2020-10-21 PROCEDURE — 82550 ASSAY OF CK (CPK): CPT

## 2020-10-21 PROCEDURE — 36415 COLL VENOUS BLD VENIPUNCTURE: CPT

## 2020-10-21 PROCEDURE — 83540 ASSAY OF IRON: CPT

## 2020-11-30 ENCOUNTER — HOSPITAL ENCOUNTER (OUTPATIENT)
Dept: HOSPITAL 47 - RADMAMWWP | Age: 75
Discharge: HOME | End: 2020-11-30
Attending: INTERNAL MEDICINE
Payer: MEDICARE

## 2020-11-30 DIAGNOSIS — Z12.31: Primary | ICD-10-CM

## 2020-11-30 PROCEDURE — 77063 BREAST TOMOSYNTHESIS BI: CPT

## 2020-11-30 PROCEDURE — 77067 SCR MAMMO BI INCL CAD: CPT

## 2020-12-01 NOTE — MM
Reason for exam: screening  (asymptomatic).

Baseline mammogram.



History:

Patient is postmenopausal and had first child after 30.



Physical Findings:

Nurse did not find any significant physical abnormalities on exam.



MG 3D Screening Mammo W/Cad

Bilateral CC and MLO view(s) were taken.

There are scattered fibroglandular densities.  There is no discrete abnormality.



These results were verbally communicated with the patient and result sheet given 

to the patient on 11/30/20.





ASSESSMENT: Negative, BI-RAD 1



RECOMMENDATION:

Routine screening mammogram of both breasts in 1 year.

## 2021-06-04 ENCOUNTER — HOSPITAL ENCOUNTER (OUTPATIENT)
Dept: HOSPITAL 47 - RADXRMAIN | Age: 76
Discharge: HOME | End: 2021-06-04
Attending: INTERNAL MEDICINE
Payer: COMMERCIAL

## 2021-06-04 DIAGNOSIS — S62.614A: Primary | ICD-10-CM

## 2021-06-04 NOTE — XR
Right hand

 

HISTORY: Trauma and pain

 

4 views of the right hand

 

There is a displaced oblique distal metaphyseal fracture of the proximal phalanx of the fourth digit 
of the right hand with associated soft tissue swelling. There may be some punctate associated comminu
alessia fragments. No evident intra-articular involvement. Bone mineralization is reduced. No dislocation
.

 

IMPRESSION: Fourth digit fracture.

## 2022-07-28 ENCOUNTER — HOSPITAL ENCOUNTER (OUTPATIENT)
Dept: HOSPITAL 47 - LABWHC1 | Age: 77
Discharge: HOME | End: 2022-07-28
Attending: INTERNAL MEDICINE
Payer: MEDICARE

## 2022-07-28 DIAGNOSIS — N39.0: ICD-10-CM

## 2022-07-28 DIAGNOSIS — I10: Primary | ICD-10-CM

## 2022-07-28 LAB
ALBUMIN SERPL-MCNC: 4.5 G/DL (ref 3.8–4.9)
ALBUMIN/GLOB SERPL: 1.73 G/DL (ref 1.6–3.17)
ALP SERPL-CCNC: 68 U/L (ref 41–126)
ALT SERPL-CCNC: <5 U/L (ref 8–44)
ANION GAP SERPL CALC-SCNC: 14.2 MMOL/L (ref 10–18)
AST SERPL-CCNC: 16 U/L (ref 13–35)
BASOPHILS # BLD AUTO: 0.07 X 10*3/UL (ref 0–0.1)
BASOPHILS NFR BLD AUTO: 1 %
BUN SERPL-SCNC: 18.6 MG/DL (ref 9–27)
BUN/CREAT SERPL: 24.8 RATIO (ref 12–20)
CALCIUM SPEC-MCNC: 9.4 MG/DL (ref 8.7–10.3)
CHLORIDE SERPL-SCNC: 101 MMOL/L (ref 96–109)
CO2 SERPL-SCNC: 24.9 MMOL/L (ref 20–27.5)
EOSINOPHIL # BLD AUTO: 0.13 X 10*3/UL (ref 0.04–0.35)
EOSINOPHIL NFR BLD AUTO: 1.9 %
ERYTHROCYTE [DISTWIDTH] IN BLOOD BY AUTOMATED COUNT: 3.94 X 10*6/UL (ref 4.1–5.2)
ERYTHROCYTE [DISTWIDTH] IN BLOOD: 13.9 % (ref 11.5–14.5)
GLOBULIN SER CALC-MCNC: 2.6 G/DL (ref 1.6–3.3)
GLUCOSE SERPL-MCNC: 146 MG/DL (ref 70–110)
HCT VFR BLD AUTO: 36.2 % (ref 37.2–46.3)
HGB BLD-MCNC: 12 G/DL (ref 12–15)
IMM GRANULOCYTES BLD QL AUTO: 0.3 %
LYMPHOCYTES # SPEC AUTO: 1.2 X 10*3/UL (ref 0.9–5)
LYMPHOCYTES NFR SPEC AUTO: 17.9 %
MCH RBC QN AUTO: 30.5 PG (ref 27–32)
MCHC RBC AUTO-ENTMCNC: 33.1 G/DL (ref 32–37)
MCV RBC AUTO: 91.9 FL (ref 80–97)
MONOCYTES # BLD AUTO: 0.63 X 10*3/UL (ref 0.2–1)
MONOCYTES NFR BLD AUTO: 9.4 %
NEUTROPHILS # BLD AUTO: 4.66 X 10*3/UL (ref 1.8–7.7)
NEUTROPHILS NFR BLD AUTO: 69.5 %
NRBC BLD AUTO-RTO: 0 /100 WBCS (ref 0–0)
PLATELET # BLD AUTO: 250 X 10*3/UL (ref 140–440)
POTASSIUM SERPL-SCNC: 3.9 MMOL/L (ref 3.5–5.5)
PROT SERPL-MCNC: 7.1 G/DL (ref 6.2–8.2)
SODIUM SERPL-SCNC: 140 MMOL/L (ref 135–145)
WBC # BLD AUTO: 6.71 X 10*3/UL (ref 4.5–10)

## 2022-07-28 PROCEDURE — 85025 COMPLETE CBC W/AUTO DIFF WBC: CPT

## 2022-07-28 PROCEDURE — 36415 COLL VENOUS BLD VENIPUNCTURE: CPT

## 2022-07-28 PROCEDURE — 80053 COMPREHEN METABOLIC PANEL: CPT

## 2023-01-19 ENCOUNTER — HOSPITAL ENCOUNTER (OUTPATIENT)
Dept: HOSPITAL 47 - EC | Age: 78
Setting detail: OBSERVATION
Discharge: HOME | End: 2023-01-19
Attending: INTERNAL MEDICINE | Admitting: INTERNAL MEDICINE
Payer: MEDICARE

## 2023-01-19 ENCOUNTER — HOSPITAL ENCOUNTER (OUTPATIENT)
Dept: HOSPITAL 47 - RADUSWWP | Age: 78
Discharge: HOME | End: 2023-01-19
Attending: INTERNAL MEDICINE
Payer: MEDICARE

## 2023-01-19 VITALS — SYSTOLIC BLOOD PRESSURE: 119 MMHG | RESPIRATION RATE: 18 BRPM | HEART RATE: 84 BPM | DIASTOLIC BLOOD PRESSURE: 76 MMHG

## 2023-01-19 VITALS — TEMPERATURE: 98 F

## 2023-01-19 DIAGNOSIS — L03.115: ICD-10-CM

## 2023-01-19 DIAGNOSIS — F20.9: ICD-10-CM

## 2023-01-19 DIAGNOSIS — L03.90: ICD-10-CM

## 2023-01-19 DIAGNOSIS — I10: ICD-10-CM

## 2023-01-19 DIAGNOSIS — K59.09: ICD-10-CM

## 2023-01-19 DIAGNOSIS — L03.116: ICD-10-CM

## 2023-01-19 DIAGNOSIS — I82.531: ICD-10-CM

## 2023-01-19 DIAGNOSIS — I82.411: ICD-10-CM

## 2023-01-19 DIAGNOSIS — R26.9: ICD-10-CM

## 2023-01-19 DIAGNOSIS — R32: ICD-10-CM

## 2023-01-19 DIAGNOSIS — I82.511: Primary | ICD-10-CM

## 2023-01-19 DIAGNOSIS — E11.65: ICD-10-CM

## 2023-01-19 DIAGNOSIS — R60.0: ICD-10-CM

## 2023-01-19 DIAGNOSIS — T43.4X5A: ICD-10-CM

## 2023-01-19 DIAGNOSIS — Z79.899: ICD-10-CM

## 2023-01-19 DIAGNOSIS — Z83.3: ICD-10-CM

## 2023-01-19 DIAGNOSIS — F41.9: ICD-10-CM

## 2023-01-19 DIAGNOSIS — F31.9: ICD-10-CM

## 2023-01-19 DIAGNOSIS — I82.431: Primary | ICD-10-CM

## 2023-01-19 DIAGNOSIS — E55.9: ICD-10-CM

## 2023-01-19 DIAGNOSIS — Z82.3: ICD-10-CM

## 2023-01-19 DIAGNOSIS — G25.1: ICD-10-CM

## 2023-01-19 LAB
ANION GAP SERPL CALC-SCNC: 6 MMOL/L
APTT BLD: 24.3 SEC (ref 22–30)
BASOPHILS # BLD AUTO: 0 K/UL (ref 0–0.2)
BASOPHILS NFR BLD AUTO: 1 %
BUN SERPL-SCNC: 17 MG/DL (ref 7–17)
CALCIUM SPEC-MCNC: 9 MG/DL (ref 8.4–10.2)
CHLORIDE SERPL-SCNC: 106 MMOL/L (ref 98–107)
CO2 SERPL-SCNC: 27 MMOL/L (ref 22–30)
EOSINOPHIL # BLD AUTO: 0.2 K/UL (ref 0–0.7)
EOSINOPHIL NFR BLD AUTO: 4 %
ERYTHROCYTE [DISTWIDTH] IN BLOOD BY AUTOMATED COUNT: 3.7 M/UL (ref 3.8–5.4)
ERYTHROCYTE [DISTWIDTH] IN BLOOD: 13.4 % (ref 11.5–15.5)
GLUCOSE SERPL-MCNC: 144 MG/DL (ref 74–99)
HCT VFR BLD AUTO: 34.1 % (ref 34–46)
HGB BLD-MCNC: 11.4 GM/DL (ref 11.4–16)
INR PPP: 0.9 (ref ?–1.2)
LYMPHOCYTES # SPEC AUTO: 1.3 K/UL (ref 1–4.8)
LYMPHOCYTES NFR SPEC AUTO: 25 %
MCH RBC QN AUTO: 30.8 PG (ref 25–35)
MCHC RBC AUTO-ENTMCNC: 33.4 G/DL (ref 31–37)
MCV RBC AUTO: 92.2 FL (ref 80–100)
MONOCYTES # BLD AUTO: 0.4 K/UL (ref 0–1)
MONOCYTES NFR BLD AUTO: 9 %
NEUTROPHILS # BLD AUTO: 3 K/UL (ref 1.3–7.7)
NEUTROPHILS NFR BLD AUTO: 60 %
PLATELET # BLD AUTO: 257 K/UL (ref 150–450)
POTASSIUM SERPL-SCNC: 4.1 MMOL/L (ref 3.5–5.1)
PT BLD: 10.1 SEC (ref 9–12)
SODIUM SERPL-SCNC: 139 MMOL/L (ref 137–145)
WBC # BLD AUTO: 5 K/UL (ref 3.8–10.6)

## 2023-01-19 PROCEDURE — 99285 EMERGENCY DEPT VISIT HI MDM: CPT

## 2023-01-19 PROCEDURE — 36415 COLL VENOUS BLD VENIPUNCTURE: CPT

## 2023-01-19 PROCEDURE — 80048 BASIC METABOLIC PNL TOTAL CA: CPT

## 2023-01-19 PROCEDURE — 85730 THROMBOPLASTIN TIME PARTIAL: CPT

## 2023-01-19 PROCEDURE — 85379 FIBRIN DEGRADATION QUANT: CPT

## 2023-01-19 PROCEDURE — 85610 PROTHROMBIN TIME: CPT

## 2023-01-19 PROCEDURE — 85025 COMPLETE CBC W/AUTO DIFF WBC: CPT

## 2023-01-19 PROCEDURE — 93970 EXTREMITY STUDY: CPT

## 2023-01-19 PROCEDURE — 93005 ELECTROCARDIOGRAM TRACING: CPT

## 2023-01-19 NOTE — P.CONS
History of Present Illness





- Reason for Consult


Consult date: 01/19/23


Left lower extremity cellulitis


Requesting physician: Waqas George





- Chief Complaint


Abnormal ultrasound x one day





- History of Present Illness


Patient is a 77-year-old  female with a past medical history 

significant for hypertension anxiety depression has if any history of DVT 

patient also have a chronic wound to the right gluteal area for which the 

patient to follow at [facility] wound [facility] [place] for the last 3 weeks and the 

patient was seen in the wound care center today did have some debridement done 

patient also noticed to have increasing swelling to bilateral lower extremity 

with more redness to the left leg and this patient finished a mild aching pain 

2-3 out of 10 and no radiation evacuation currently do not have any blister or 

open wound to the left lower extremity mostly diffuse swelling and redness 

patient apparently did have a Doppler ultrasound in outpatient setting that was 

positive for DVT for the patient was sent to the ER for further evaluation, 

patient on presentation to the hospital was afebrile, patient did have normal 

white count of 5.0 creatinine was normal infectious disease was consulted for 

left lower extremity cellulitis and antibiotic therapy








Review of Systems


Positive point has been  mentioned in the HPI rest of the systems are negative








Past Medical History


Past Medical History: Hypertension


History of Any Multi-Drug Resistant Organisms: None Reported


Past Surgical History: No Surgical Hx Reported


Past Psychological History: Anxiety, Depression, Schizophrenia


Past Alcohol Use History: None Reported


Past Drug Use History: None Reported





- Past Family History


  ** Father


Family Medical History: CVA/TIA, Diabetes Mellitus





Medications and Allergies


                                Home Medications











 Medication  Instructions  Recorded  Confirmed  Type


 


Benztropine Mesylate 0.5 mg PO TID@0700,1400,2000 04/20/19 01/19/23 History


 


Cholecalciferol [Vitamin D3 (25 25 mcg PO W/SUPPER@1700 07/09/19 01/19/23 

History





Mcg = 1000 Iu)]    


 


Ferrous Sulfate [Iron (65  mg PO W/SUPPER@1700 07/09/19 01/19/23 History





Elemental)]    


 


Pantoprazole [Protonix] 40 mg PO DAILY@0700 07/09/19 01/19/23 History


 


Carbidopa-Levodopa ER 50-200Mg 1 tab PO BID@0700,1400 01/19/23 01/19/23 History





[Sinemet CR  mg]    


 


Collagenase [Santyl Ointment] 1 applic TOPICAL DAILY@1400 01/19/23 01/19/23 

History


 


Lactulose 20 gm PO Q2D@0700 01/19/23 01/19/23 History


 


Losartan Potassium 100 mg PO DAILY@0700 01/19/23 01/19/23 History


 


QUEtiapine [SEROquel] 100 mg PO HS@2000 01/19/23 01/19/23 History


 


Sennosides/Docusate Sodium [Senna 1 tab PO DAILY@0700 01/19/23 01/19/23 History





Plus 8.6-50 mg Tablet]    


 


Sertraline [Zoloft] 75 mg PO DAILY@0700 01/19/23 01/19/23 History


 


amLODIPine [Norvasc] 10 mg PO HS@2000 01/19/23 01/19/23 History


 


haloperidoL [Haldol] 7.5 mg PO DAILY@0700 01/19/23 01/19/23 History


 


hydrALAZINE HCL [Apresoline] 25 mg PO TID@0700,1400,2000 01/19/23 01/19/23 

History


 


polyethylene glycoL 3350 [Miralax] 17 gm PO DAILY@0700 01/19/23 01/19/23 History








                                    Allergies











Allergy/AdvReac Type Severity Reaction Status Date / Time


 


No Known Allergies Allergy   Verified 01/19/23 17:05














Physical Exam


Vitals: 


                                   Vital Signs











  Temp Pulse Resp BP Pulse Ox


 


 01/19/23 17:37   84  18  119/76  100


 


 01/19/23 14:06  98 F  87  16  137/74  96








                                Intake and Output











 01/19/23 01/19/23 01/19/23





 06:59 14:59 22:59


 


Other:   


 


  Weight  69.853 kg 











GENERAL DESCRIPTION: Elderly female up in the chair, no distress. No tachypnea 

or accessory muscle of respiration use.


HEENT: Shows Pallor , no scleral icterus. Oral mucous membrane is dry. No pharyn

geal erythema or thrush


NECK: Trachea central, no thyromegaly.


LUNGS: Unlabored breathing. Clear to auscultation anteriorly. No wheeze or 

crackle.


HEART: S1, S2, regular rate and rhythm. No loud murmur


ABDOMEN: Soft, no tenderness , guarding or rigidity, no organomegaly


EXTREMITIES: Diffuse swelling to bilateral lower extremity left greater than 

right patient did have evidence of redness and warmth to touch.


SKIN: No rash, no masses palpable.


NEUROLOGICAL: The patient is awake, alert, oriented x3, mood and affect normal.

















Results


CBC & Chem 7: 


                                 01/19/23 14:43





                                 01/19/23 14:43


Labs: 


                  Abnormal Lab Results - Last 24 Hours (Table)











  01/19/23 01/19/23 01/19/23 Range/Units





  14:43 14:43 14:43 


 


RBC  3.70 L    (3.80-5.40)  m/uL


 


D-Dimer    2.20 H  (<0.60)  mg/L FEU


 


Glucose   144 H   (74-99)  mg/dL














Assessment and Plan


(1) Left leg cellulitis


Status: Acute   Code(s): L03.116 - CELLULITIS OF LEFT LOWER LIMB   SNOMED 

Code(s): 681610064


   


Plan: 


1patient with a history of chronic swelling to bilateral lower extremity in 

history of DVT presenting with abnormal ultrasound and also having increasing 

swelling redness to left lower extremity concern for possible cellulitis likely 

from gram-positive skin michael such as Streptococcus clinically doubt MRSA


2marked the area of the redness


3we will suggest cefazolin 2 g every 8 hours if the patient got admitted 

however if the patient ended up getting discharge recommending Keflex 500 mg 3 

times a day for 7 days this was explained to the family at the bedside


We will follow on clinical condition and cultures to further adjust medication 

if needed


Thank you for this consultation will follow this patient with you








Time with Patient: Greater than 30

## 2023-01-19 NOTE — HP
HISTORY AND PHYSICAL



This is the admission and discharge note.



She is a 77-year-old female, seen in the ER, module 5 examination room and also

discharged in the emergency room from being in the emergency room, she never admitted

to the floor.  She is a FULL CODE.  Her height 5 feet 6 inches, weight 69.853 kg, BSA

1.79 m2, BMI 24.9 kg/m2.



ALLERGIES:

Unknown.



I am dictating the admission as well as the discharge from the ER after the patient was

seen and evaluated.



HISTORY OF PRESENT ILLNESS AND CHIEF COMPLAINT:

The patient presented to the office today with the complaint of leg swelling and

erythema of the legs on both sides on the shaft of the tibia bilaterally and she has

been seen in the Wound Clinic at [facility] for underlying event of deep

decubitus in the right gluteal area, has been treated in the wound clinic.



The patient at that time seen in the office because of her leg edema, pitting in

nature, progressively worsening and with erythema.  At that time, the patient after

examining in the office, she was sent to the [facility] ultrasound for ruling out DVT.

Subsequently, the patient had ultrasound and venous duplex scan in [facility] and I was

called at that time.  The patient had DVT in the right lower extremity in 2 sites, 1 in

the popliteal, 1 in the femoral.  At that time, there is no note from the Radiology to

indicate it is chronic or it is acute.  However, she has edema and pitting edema and

the suspicious was high.  I give her at that time laboratory testing and D-dimer to

indicate if there is activity or not, meanwhile, the erythema, and we started her on

oral Keflex 3 times a day for 1 week, and after she went to the hospital with the

ultrasound, they called me to inform me with the results with the unknown previous

history of chronic DVT.  The patient sent to the emergency room, where she was seen by

the ER physician, and at that time, they _____ ER physician did talk to the Radiology

as we stated and they said that it is the same as it was in 2019, and over this 3 years

or 4 years, has been the same, and at this time with these underlying effects, we

consulted Dr. Hare, the vascular surgeon as well as I did see her in the ER with the

underlying pitting edema.  We consulted also Dr. Lior, Infectious Disease.



Subsequently as the patient was seen by Dr. Hare, he talked to me indicating that

this is chronic and he is not concerned about it, and meanwhile, she wants to go home

with the underlying history of significant psychiatric problem and medication, and she

had history of bipolar as well as depression as well as schizophrenia.  She is more

comfortable at home.  Dr. Hare also stated that his erythema of the left leg and

cellulitis is not significant, and he will be treating this with the Silvadene Silver

cream, which is already I gave her prescription for that as well as antibiotic, and he

will be following her as outpatient in his office, and as the patient requests to be

going home as well and Dr. Hare agreed that she can be treated as outpatient and at

that time, the patient discharged from the ER to go home with the home medication added

to the medication that is given to her from the office and the office medication was

indicating that she is getting her medication of Keflex 500 mg t.i.d. for 1 week as

well as SSD cream to be applied once a day on her leg calf as well as she will be

followed by Dr. Mamadou Hare, the vascular surgeon.  Meanwhile, she continued her home

medication, which I did reconcile the home medication and she continued on the same

medication.  I did review her laboratories as well done in the emergency room as well

as her vital sign, which was stable as well as laboratory with no significant

abnormalities.  However, I requested the D-dimer, which was not done in the emergency

room.



PAST MEDICAL HISTORY:

She is never , she has no history of smoking or drinking any alcoholic

beverages, and she has 1 brother, who is Mr. Galindo Persaud, who is the caregiver.



REVIEW OF SYSTEMS:

Only contributory for the leg swelling and edema, otherwise, noncontributory.



PHYSICAL EXAMINATION:

GENERAL:  On the face-to-face examination, the patient was conscious, alert.  She is

having multiple psychiatric problems.  She was unable to walk and she is in the wheel

chair.

HEENT:  Her head was normocephalic, atraumatic.  Pupils were equal and reactive.  Her

ear was negative, minimal wax.  Nose was negative.  She had natural teeth, stable in

midline.

NECK:  Supple.  No JVD.  No thyromegaly.  No lymphadenopathy.  Trachea midline.

CHEST:  Mild kyphoscoliosis, however, the lung has normal breath sounds bilaterally.

No wheezes.  No rhonchi.

HEART:  PMI in the 5th intercostal space with normal S1, S2.  No gallop.

ABDOMEN:  Soft, nontender.  Positive bowel sounds.

EXTREMITIES:  As mentioned, she had positive pulses bilateral, but she had cellulitis

of both left and right shaft of the leg with edema, pitting edema, 2+ as well.



We added to her medication in the office as seen today, also Lasix 20 mg once a day and

she had been taken losartan.  She had history of previously _____.  She had a past

history of hypertension and she has been treated.  She had history of seeing a

psychiatrist and several of them, last one Dr. Franco and he be seen.  She has gait and

mobility abnormalities.  She has venous insufficiency of the bilateral and she wear as

well stocking compression stocking and she has as mentioned bipolar disorder and

schizophrenia and tremors intermittently secondary to the extrapyramidal as well as

anxiety and insomnia as well as tremors as well as urinary incontinence intermittently.

The procedure done was done as outpatient today for the ultrasound of the lower

extremities and found to be right leg DVT in the femoral and the popliteal vein,

however, I was told that the left leg has no DVT.  This event is chronic and as

mentioned, seen by Dr. Mamadou Hare, the vascular surgeon and he was not concerned

about that at this time and will be treated as outpatient with continuation of her

medication.  The patient was discharged from the ER after seen by Dr. Mamadou Hare.





MMODL / IJN: [id number] / Job#: [id number]

## 2023-01-19 NOTE — US
EXAMINATION TYPE: US venous doppler duplex LE 

 

DATE OF EXAM: 1/19/2023 1:01 PM

 

COMPARISON: NONE

 

CLINICAL HISTORY: R60.0 edema, L03.115 L03.116 cellulitis left/right. Edema

 

SIDE PERFORMED: Bilateral  

 

TECHNIQUE:  The lower extremity deep venous system is examined utilizing real time linear array sonog
sujatha with graded compression, doppler sonography and color-flow sonography.

 

VESSELS IMAGED:

Common Femoral Vein

Deep Femoral Vein

Greater Saphenous Vein *

Femoral Vein

Popliteal Vein

Small Saphenous Vein *

Proximal Calf Veins

(* superficial vessels)

 

 

 

Right Leg:  Positive for DVT Femoral and Popliteal Veins. 

 

Left Leg:  Negative for DVT, Grayscale, color doppler, spectral doppler imaging performed of the deep
 veins of the lower extremities.  There is normal flow, compressibility, vascular waveforms.

 

 

 

IMPRESSION:  

Positive deep vein to most of the femoral and popliteal veins on the right. 

 

Ordering provider was contacted and patient was directed to the ER.

## 2023-01-19 NOTE — P.HPIM
History of Present Illness


H&P Date: 01/19/23 (bilateral edema of lower extremites)


Chief Complaint: edema of  her leg with erythema of shaft





dict#[id number]


disch#[id number]





Past Medical History


Past Medical History: Hypertension


History of Any Multi-Drug Resistant Organisms: None Reported


Past Surgical History: No Surgical Hx Reported


Past Psychological History: Anxiety, Depression, Schizophrenia


Past Alcohol Use History: None Reported


Past Drug Use History: None Reported





- Past Family History


  ** Father


Family Medical History: CVA/TIA, Diabetes Mellitus





Medications and Allergies


                                Home Medications











 Medication  Instructions  Recorded  Confirmed  Type


 


Benztropine Mesylate 0.5 mg PO TID@0700,1400,2000 04/20/19 01/19/23 History


 


Cholecalciferol [Vitamin D3 (25 25 mcg PO W/SUPPER@1700 07/09/19 01/19/23 

History





Mcg = 1000 Iu)]    


 


Ferrous Sulfate [Iron (65  mg PO W/SUPPER@1700 07/09/19 01/19/23 History





Elemental)]    


 


Pantoprazole [Protonix] 40 mg PO DAILY@0700 07/09/19 01/19/23 History


 


Carbidopa-Levodopa ER 50-200Mg 1 tab PO BID@0700,1400 01/19/23 01/19/23 History





[Sinemet CR  mg]    


 


Collagenase [Santyl Ointment] 1 applic TOPICAL DAILY@1400 01/19/23 01/19/23 

History


 


Lactulose 20 gm PO Q2D@0700 01/19/23 01/19/23 History


 


Losartan Potassium 100 mg PO DAILY@0700 01/19/23 01/19/23 History


 


QUEtiapine [SEROquel] 100 mg PO HS@2000 01/19/23 01/19/23 History


 


Sennosides/Docusate Sodium [Senna 1 tab PO DAILY@0700 01/19/23 01/19/23 History





Plus 8.6-50 mg Tablet]    


 


Sertraline [Zoloft] 75 mg PO DAILY@0700 01/19/23 01/19/23 History


 


amLODIPine [Norvasc] 10 mg PO HS@2000 01/19/23 01/19/23 History


 


haloperidoL [Haldol] 7.5 mg PO DAILY@0700 01/19/23 01/19/23 History


 


hydrALAZINE HCL [Apresoline] 25 mg PO TID@0700,1400,2000 01/19/23 01/19/23 

History


 


polyethylene glycoL 3350 [Miralax] 17 gm PO DAILY@0700 01/19/23 01/19/23 History








                                    Allergies











Allergy/AdvReac Type Severity Reaction Status Date / Time


 


No Known Allergies Allergy   Verified 01/19/23 17:05














Physical Exam


Vitals: 


                                   Vital Signs











  Temp Pulse Resp BP Pulse Ox


 


 01/19/23 14:06  98 F  87  16  137/74  96








                                Intake and Output











 01/19/23 01/19/23 01/19/23





 06:59 14:59 22:59


 


Other:   


 


  Weight  69.853 kg 














Results


CBC & Chem 7: 


                                 01/19/23 14:43





                                 01/19/23 14:43


Labs: 


                  Abnormal Lab Results - Last 24 Hours (Table)











  01/19/23 01/19/23 01/19/23 Range/Units





  14:43 14:43 14:43 


 


RBC  3.70 L    (3.80-5.40)  m/uL


 


D-Dimer    2.20 H  (<0.60)  mg/L FEU


 


Glucose   144 H   (74-99)  mg/dL

## 2023-01-19 NOTE — ED
General Adult HPI





- General


Chief complaint: Recheck/Abnormal Lab/Rx


Stated complaint: Blood Clot, Sent by US


Time Seen by Provider: 01/19/23 14:29


Source: patient, family


Mode of arrival: wheelchair


Limitations: no limitations





- History of Present Illness


Initial comments: 


Dictation was produced using dragon dictation software. please excuse any g

rammatical, word or spelling errors. 











Chief Complaint: 77-year-old female presents to the emergency department for 

abnormal ultrasound





History of Present Illness: Patient is 77-year-old female she presents to the 

emergency department for abnormal ultrasound.  Today she had a venous duplex 

ultrasound of bilateral lower extremities.  The right lower extremity was 

positive for DVT.  Radiology called patient's primary care doctor was ordering 

physician and she was redirected to the emergency department.  Patient currently

asymptomatic in her right lower extremity.  She's been battling cellulitis 

that's been treated outpatient by her primary care doctor.  Patient states she's

had a blood clot in her leg for several years.  Patient has any chest pain or 

shortness of breath.  Denies any leg pain.  She's been treated for cellulitis of

her left leg.








The ROS documented in this emergency department record has been reviewed and 

confirmed by me.  Those systems with pertinent positive or negative responses 

have been documented in the HPI.  All other systems are other negative and/or 

noncontributory.








PHYSICAL EXAM:


General Impression: Alert and oriented x3, not in acute distress


HEENT: Normocephalic atraumatic, extra-ocular movements intact, pupils equal and

reactive to light bilaterally, mucous membranes moist.


Cardiovascular: Heart regular rate and rhythm


Chest: Able to complete full sentences, no retractions, no tachypnea


Abdomen: abdomen soft, non-tender, non-distended, no organomegaly


Musculoskeletal: Pulses present and equal in all extremities, no peripheral 

edema


Motor:  no focal deficits noted


Neurological: CN II-XII grossly intact, no focal motor or sensory deficits noted


Skin: Intact with no visualized rashes


Psych: Normal affect and mood





ED course: 77-year-old well-appearing female presents emergency department for 

abnormal ultrasound.  Ultrasound from today was reviewed showing DVT in her 

right popliteal right femoral vein.  Ultrasound imaging was reviewed with 

radiologist.  Patient had a ultrasound of the right lower extremity in 2019 that

shows similar findings.  Radiologist felt that after reevaluation of her scans 

that clinical presentation consistent with chronic right lower extremity DVT.  

Patient on any blood thinners.





Nursing notes and chart review was performed





Case was discussed with patient's legal guardian, Eric Persaud.





Laboratory evaluation obtained.  CBC unremarkable.  Coag panel is negative.  

Metabolic panel is negative.  Patient's primary care physician came to the 

bedside to evaluate the patient.  He spoke with patient and convince patient 

that she should be admitted for cellulitis treatment.  Consult were placed on 

behalf of admitting physician for infectious disease.





Was pt. sent in by a medical professional or institution (, PA, NP, urgent 

care, hospital, or nursing home...) When possible be specific


@  -Primary care physician


Did you speak to anyone other than the patient for history (EMS, parent, family,

police, friend...)? What history was obtained from this source 


@  -Group home staff member at the bedside


Did you review nursing and triage notes (agree or disagree)?  Why? 


@  -I reviewed and agree with nursing and triage notes


Were old charts reviewed (outside hosp., previous admission, EMS record, old 

EKG, old radiological studies, urgent care reports/EKG's, nursing home records)?

Report findings 


@  -Ultrasound from 04/20/2019 was reviewed showing similar appearing DVT


Differential Diagnosis (chest pain, altered mental status, abdominal pain women,

abdominal pain men, vaginal bleeding, weakness, fever, dyspnea, syncope, 

headache, dizziness, GI bleed, back pain, seizure, CVA, palpatations, mental 

health)? 


@  -not applicable


EKG interpreted by me (3pts min.).


@  -None done


X-rays interpreted by me (1pt min.).


@  -None done


CT interpreted by me (1pt min.).


@  -None done


U/S interpreted by me (1pt. min.).


@  -None done


What testing was considered but not performed or refused? (CT, X-rays, U/S, 

labs)? Why?


@  -None


What meds were considered but not given or refused? Why?


@  -None


Did you discuss the management of the patient with other professionals 

(professionals i.e. CHRISTIE Velazquez, NP, lab, RT, psych nurse, , , 

teacher, , )? Give summary


@  -Vascular surgeon, , primary care physician, Dr. George


Was smoking cessation discussed for >3mins.?


@  -No


Was critical care preformed (if so, how long)?


@  -No


Were there social determinants of health that impacted care today? How? 

(Homelessness, low income, unemployed, alcoholism, drug addiction, 

transportation, low edu. Level, literacy, decrease access to med. care, group home, 

rehab)?


@  -No


Was there de-escalation of care discussed even if they declined (Discuss DNR or 

withdrawal of care, Hospice)? DNR status


@  -No


What co-morbidities impacted this encounter? (DM, HTN, Smoking, COPD, CAD, 

Cancer, CVA, ARF, Chemo, Hep., AIDS, mental health diagnosis, sleep apnea, 

morbid obesity)?


@  -None


Was patient admitted / discharged? Hospital course, mention meds given and 

route, prescriptions, significant lab abnormalities, going to OR and other 

pertinent info.


@  -See above 


Undiagnosed new problem with uncertain prognosis?


@  -No


Drug Therapy requiring intensive monitoring for toxicity (Heparin, Nitro, 

Insulin, Cardizem)?


@  -No


Were any procedures done?


@  -No


Diagnosis/symptom?


@  -Not applicable


Acute, or Chronic, or Acute on Chronic?


@  -Not applicable


Uncomplicated (without systemic symptoms) or Complicated (systemic symptoms)?


@  -default


Side effects of treatment?


@  -No


Exacerbation, Progression, or Severe Exacerbation?


@  -No


Poses a threat to life or bodily function? How? (Chest pain, USA, MI, pneumonia,

PE, COPD, DKA, ARF, appy, cholecystitis, CVA, Diverticulitis, Homicidal, Suici

le[place], threat to staff... and all critical care pts)


@  -No











- Related Data


                                Home Medications











 Medication  Instructions  Recorded  Confirmed


 


Benztropine Mesylate 0.5 mg PO TID 04/20/19 07/09/19


 


haloperidoL [Haldol] 5 mg PO DAILY 04/20/19 07/09/19


 


QUEtiapine FUMARATE [SEROquel] 300 mg PO HS 06/03/19 07/09/19


 


amantadine HCL [Amantadine] 100 mg PO BID 06/03/19 07/09/19


 


Cholecalciferol [Vitamin D3 (25 1,000 unit PO DAILY 07/09/19 07/09/19





Mcg = 1000 Iu)]   


 


Ferrous Sulfate [Iron (65  mg PO DAILY 07/09/19 07/09/19





Elemental)]   


 


Pantoprazole [Protonix] 40 mg PO DAILY 07/09/19 07/09/19








                                  Previous Rx's











 Medication  Instructions  Recorded


 


Amoxicillin/Potassium Clav 1 tab PO BID 3 Days #6 tab 07/12/19





[Augmentin 875-125 Tablet]  


 


Lisinopril-Hctz 10-12.5 mg 1 each PO DAILY  tab 07/12/19





[Zestoretic 10-12.5]  


 


amLODIPine [Norvasc] 5 mg PO DAILY  tab 07/12/19











                                    Allergies











Allergy/AdvReac Type Severity Reaction Status Date / Time


 


No Known Allergies Allergy   Verified 07/09/19 16:22














Review of Systems


ROS Statement: 


Those systems with pertinent positive or pertinent negative responses have been 

documented in the HPI.





ROS Other: All systems not noted in ROS Statement are negative.





Past Medical History


Past Medical History: Hypertension


History of Any Multi-Drug Resistant Organisms: None Reported


Past Surgical History: No Surgical Hx Reported


Past Psychological History: Anxiety, Depression, Schizophrenia


Past Alcohol Use History: None Reported


Past Drug Use History: None Reported





- Past Family History


  ** Father


Family Medical History: CVA/TIA, Diabetes Mellitus





General Exam


Limitations: no limitations





Course


                                   Vital Signs











  01/19/23





  14:06


 


Temperature 98 F


 


Pulse Rate 87


 


Respiratory 16





Rate 


 


Blood Pressure 137/74


 


O2 Sat by Pulse 96





Oximetry 














Medical Decision Making





- Lab Data


Result diagrams: 


                                 01/19/23 14:43





                                 01/19/23 14:43


                                   Lab Results











  01/19/23 01/19/23 01/19/23 Range/Units





  14:43 14:43 14:43 


 


WBC  5.0    (3.8-10.6)  k/uL


 


RBC  3.70 L    (3.80-5.40)  m/uL


 


Hgb  11.4    (11.4-16.0)  gm/dL


 


Hct  34.1    (34.0-46.0)  %


 


MCV  92.2    (80.0-100.0)  fL


 


MCH  30.8    (25.0-35.0)  pg


 


MCHC  33.4    (31.0-37.0)  g/dL


 


RDW  13.4    (11.5-15.5)  %


 


Plt Count  257    (150-450)  k/uL


 


MPV  7.7    


 


Neutrophils %  60    %


 


Lymphocytes %  25    %


 


Monocytes %  9    %


 


Eosinophils %  4    %


 


Basophils %  1    %


 


Neutrophils #  3.0    (1.3-7.7)  k/uL


 


Lymphocytes #  1.3    (1.0-4.8)  k/uL


 


Monocytes #  0.4    (0-1.0)  k/uL


 


Eosinophils #  0.2    (0-0.7)  k/uL


 


Basophils #  0.0    (0-0.2)  k/uL


 


PT   10.1   (9.0-12.0)  sec


 


INR   0.9   (<1.2)  


 


APTT   24.3   (22.0-30.0)  sec


 


Sodium    139  (137-145)  mmol/L


 


Potassium    4.1  (3.5-5.1)  mmol/L


 


Chloride    106  ()  mmol/L


 


Carbon Dioxide    27  (22-30)  mmol/L


 


Anion Gap    6  mmol/L


 


BUN    17  (7-17)  mg/dL


 


Creatinine    0.67  (0.52-1.04)  mg/dL


 


Est GFR (CKD-EPI)AfAm    >90  (>60 ml/min/1.73 sqM)  


 


Est GFR (CKD-EPI)NonAf    85  (>60 ml/min/1.73 sqM)  


 


Glucose    144 H  (74-99)  mg/dL


 


Calcium    9.0  (8.4-10.2)  mg/dL














Disposition


Clinical Impression: 


 Abnormal ultrasound





Disposition: ADMITTED AS IP TO THIS HOSP


Condition: Fair


Referrals: 


Waqas George MD [Primary Care Provider] - 1-2 days


Decision Time: 15:53

## 2023-01-19 NOTE — DS
DISCHARGE SUMMARY



She was in the examination room in the ER #5, and she was discharged from the ER.  She

is a FULL CODE.



DATA:

5 feet 6 inches, weight is 69.853 kg, BSA is 1.7 m2, and BMI 24.9 kg/m2.



ALLERGIES:

Unknown.



FINAL DIAGNOSES:

As mentioned:

1. She had a right chronic deep venous thrombosis on the two areas in the popliteal

    and femoral, however, seen by the vascular surgeon and Dr. Hare, and they noted

    that it is chronic and not acute.

2. The patient has a superficial skin cellulitis and amenable to be treated as

    outpatient.

Other diagnoses:

1. She had history of hypertension, controlled.

2. History of severe psychiatric disorder and she has bipolar disorder as well as she

    has schizophrenia as well as she has a history of anxiety disorder and depression.

    She had gait and mobility abnormalities, urinary incontinence, and she had venous

    insufficiency as well as wearing compression stocking added from previous problem.

    She had history of hypertension and history of chronic constipation as well and

    history of tremor with the side effects of the Haldol, which has been chronically

    present.



HOSPITAL COURSE:

The patient was stable on discharge and admitted and discharged at the same hours

without having any added treatment.  The patient was also seen by the vascular surgeon

and he will be following her as outpatient and we will be followed as well as

outpatient and continued her treatment, and she is currently on antibiotic added from

the office as well as the Lasix for the edema with leg elevation and continued her home

medication for the psychiatric disorder as well as she is going to use SSD

cream/ointment on the legs for the superficial cellulitis of the skin.  The patient was

discharged in stable general condition.  Her vital sign was also stable.





MMODL / IJN: [id number] / Job#: [id number]

## 2023-01-19 NOTE — CONS
CONSULTATION



HISTORY OF PRESENT ILLNESS:

This is a 77-year-old white female.  She came to the emergency room with history of

mild cellulitis of the left lower extremity.  The patient is under care of Dr. George.

The patient had ultrasound of the both legs.  Ultrasound shows the right leg has a

chronic DVT; left leg, no evidence of thrombophlebitis or evidence of DVT.



PAST MEDICAL HISTORY:

The patient has a history of chronic clot in the right lower extremity.  The patient

has been following Dr. Geroge for medical reasons.



PHYSICAL EXAMINATION:

GENERAL:  The patient was seen in the emergency room.

NECK:  Supple.  No bruit appreciated.

CHEST:  Clear.  Good air entry in both lungs.

HEART:  First and second sounds present.

ABDOMEN:  Soft and nontender.

VASCULAR:  Femorals are 1+.  The patient has a mild cellulitis of the left lower

extremity.  No evidence of calf tenderness.  No evidence of phlebitis or venous stasis

ulcer.



PLAN:

Discussed with Dr. George.  The patient will go home today.  We prescribed Silvadene

cream, which should be applied daily and compression stocking, and the patient will be

seen in the office in 1 week.





MMODL / IJN: [id number] / Job#: [id number]

## 2023-04-25 ENCOUNTER — HOSPITAL ENCOUNTER (OUTPATIENT)
Dept: HOSPITAL 47 - LABWHC1 | Age: 78
Discharge: HOME | End: 2023-04-25
Attending: FAMILY MEDICINE
Payer: MEDICARE

## 2023-04-25 DIAGNOSIS — L89.213: Primary | ICD-10-CM

## 2023-04-25 LAB
ALBUMIN SERPL-MCNC: 4.4 G/DL (ref 3.8–4.9)
ALBUMIN/GLOB SERPL: 1.61 G/DL (ref 1.6–3.17)
ALP SERPL-CCNC: 77 U/L (ref 41–126)
ALT SERPL-CCNC: 8 U/L (ref 8–44)
ANION GAP SERPL CALC-SCNC: 12.2 MMOL/L (ref 10–18)
AST SERPL-CCNC: 14 U/L (ref 13–35)
BASOPHILS # BLD AUTO: 0.11 X 10*3/UL (ref 0–0.1)
BASOPHILS NFR BLD AUTO: 1.4 %
BUN SERPL-SCNC: 29.7 MG/DL (ref 9–27)
BUN/CREAT SERPL: 30.78 RATIO (ref 12–20)
CALCIUM SPEC-MCNC: 9.1 MG/DL (ref 8.7–10.3)
CHLORIDE SERPL-SCNC: 104 MMOL/L (ref 96–109)
CO2 SERPL-SCNC: 25.8 MMOL/L (ref 20–27.5)
EOSINOPHIL # BLD AUTO: 0.16 X 10*3/UL (ref 0.04–0.35)
EOSINOPHIL NFR BLD AUTO: 2.1 %
ERYTHROCYTE [DISTWIDTH] IN BLOOD BY AUTOMATED COUNT: 3.67 X 10*6/UL (ref 4.1–5.2)
ERYTHROCYTE [DISTWIDTH] IN BLOOD: 14 % (ref 11.5–14.5)
GLOBULIN SER CALC-MCNC: 2.8 G/DL (ref 1.6–3.3)
GLUCOSE SERPL-MCNC: 110 MG/DL (ref 70–110)
HCT VFR BLD AUTO: 34.3 % (ref 37.2–46.3)
HGB BLD-MCNC: 11.2 G/DL (ref 12–15)
IMM GRANULOCYTES BLD QL AUTO: 0.1 %
LYMPHOCYTES # SPEC AUTO: 1.5 X 10*3/UL (ref 0.9–5)
LYMPHOCYTES NFR SPEC AUTO: 19.3 %
MCH RBC QN AUTO: 30.5 PG (ref 27–32)
MCHC RBC AUTO-ENTMCNC: 32.7 G/DL (ref 32–37)
MCV RBC AUTO: 93.5 FL (ref 80–97)
MONOCYTES # BLD AUTO: 0.52 X 10*3/UL (ref 0.2–1)
MONOCYTES NFR BLD AUTO: 6.7 %
NEUTROPHILS # BLD AUTO: 5.48 X 10*3/UL (ref 1.8–7.7)
NEUTROPHILS NFR BLD AUTO: 70.4 %
NRBC BLD AUTO-RTO: 0 /100 WBCS (ref 0–0)
PLATELET # BLD AUTO: 238 X 10*3/UL (ref 140–440)
POTASSIUM SERPL-SCNC: 4.4 MMOL/L (ref 3.5–5.5)
PROT SERPL-MCNC: 7.2 G/DL (ref 6.2–8.2)
SODIUM SERPL-SCNC: 142 MMOL/L (ref 135–145)
WBC # BLD AUTO: 7.78 X 10*3/UL (ref 4.5–10)

## 2023-04-25 PROCEDURE — 85652 RBC SED RATE AUTOMATED: CPT

## 2023-04-25 PROCEDURE — 80053 COMPREHEN METABOLIC PANEL: CPT

## 2023-04-25 PROCEDURE — 86140 C-REACTIVE PROTEIN: CPT

## 2023-04-25 PROCEDURE — 36415 COLL VENOUS BLD VENIPUNCTURE: CPT

## 2023-04-25 PROCEDURE — 85025 COMPLETE CBC W/AUTO DIFF WBC: CPT

## 2023-06-08 ENCOUNTER — HOSPITAL ENCOUNTER (OUTPATIENT)
Dept: HOSPITAL 47 - LABWHC1 | Age: 78
Discharge: HOME | End: 2023-06-08
Attending: INTERNAL MEDICINE
Payer: MEDICARE

## 2023-06-08 DIAGNOSIS — G25.71: ICD-10-CM

## 2023-06-08 DIAGNOSIS — I87.311: ICD-10-CM

## 2023-06-08 DIAGNOSIS — R54: ICD-10-CM

## 2023-06-08 DIAGNOSIS — L89.213: ICD-10-CM

## 2023-06-08 DIAGNOSIS — L97.312: ICD-10-CM

## 2023-06-08 DIAGNOSIS — I87.2: ICD-10-CM

## 2023-06-08 DIAGNOSIS — F20.0: ICD-10-CM

## 2023-06-08 DIAGNOSIS — I10: Primary | ICD-10-CM

## 2023-06-08 DIAGNOSIS — D64.9: ICD-10-CM

## 2023-06-08 LAB
ALBUMIN SERPL-MCNC: 4.6 D/DL (ref 3.8–4.9)
ALBUMIN/GLOB SERPL: 1.77 RATIO (ref 1.6–3.17)
ALP SERPL-CCNC: 78 U/L (ref 41–126)
ALT SERPL-CCNC: 12 U/L (ref 8–44)
ANION GAP SERPL CALC-SCNC: 13.6 MMOL/L (ref 4–12)
AST SERPL-CCNC: 13 U/L (ref 13–35)
BASOPHILS # BLD AUTO: 0.06 X 10*3/UL (ref 0–0.1)
BASOPHILS NFR BLD AUTO: 1 %
BUN SERPL-SCNC: 25.5 MG/DL (ref 9–27)
BUN/CREAT SERPL: 28.33 RATIO (ref 12–20)
CALCIUM SPEC-MCNC: 9.4 MG/DL (ref 8.7–10.3)
CHLORIDE SERPL-SCNC: 103 MMOL/L (ref 96–109)
CO2 SERPL-SCNC: 24.4 MMOL/L (ref 21.6–31.8)
EOSINOPHIL # BLD AUTO: 0.14 X 10*3/UL (ref 0.04–0.35)
EOSINOPHIL NFR BLD AUTO: 2.3 %
ERYTHROCYTE [DISTWIDTH] IN BLOOD BY AUTOMATED COUNT: 3.68 X 10*6/UL (ref 4.1–5.2)
ERYTHROCYTE [DISTWIDTH] IN BLOOD: 13.6 % (ref 11.5–14.5)
FERRITIN SERPL-MCNC: 60.7 NG/ML (ref 10–291)
GLOBULIN SER CALC-MCNC: 2.6 D/DL (ref 1.6–3.3)
GLUCOSE SERPL-MCNC: 121 MG/DL (ref 70–110)
HCT VFR BLD AUTO: 34.4 % (ref 37.2–46.3)
HGB BLD-MCNC: 11 D/DL (ref 12–15)
IMM GRANULOCYTES BLD QL AUTO: 0.2 %
IRON SERPL-MCNC: 64 UG/DL (ref 50–170)
LYMPHOCYTES # SPEC AUTO: 1.18 X 10*3/UL (ref 0.9–5)
LYMPHOCYTES NFR SPEC AUTO: 19.7 %
MCH RBC QN AUTO: 29.9 PG (ref 27–32)
MCHC RBC AUTO-ENTMCNC: 32 D/DL (ref 32–37)
MCV RBC AUTO: 93.5 FL (ref 80–97)
MONOCYTES # BLD AUTO: 0.65 X 10*3/UL (ref 0.2–1)
MONOCYTES NFR BLD AUTO: 10.9 %
NEUTROPHILS # BLD AUTO: 3.95 X 10*3/UL (ref 1.8–7.7)
NEUTROPHILS NFR BLD AUTO: 65.9 %
NRBC BLD AUTO-RTO: 0 X 10*3/UL (ref 0–0.01)
PLATELET # BLD AUTO: 247 X 10*3/UL (ref 140–440)
POTASSIUM SERPL-SCNC: 4.2 MMOL/L (ref 3.5–5.5)
PREALB SERPL-MCNC: 24.1 MG/DL (ref 18–42)
PROT SERPL-MCNC: 7.2 D/DL (ref 6.2–8.2)
SODIUM SERPL-SCNC: 141 MMOL/L (ref 135–145)
TIBC SERPL-MCNC: 399 UG/DL (ref 228–460)
WBC # BLD AUTO: 5.99 X 10*3/UL (ref 4.5–10)

## 2023-06-08 PROCEDURE — 85045 AUTOMATED RETICULOCYTE COUNT: CPT

## 2023-06-08 PROCEDURE — 84134 ASSAY OF PREALBUMIN: CPT

## 2023-06-08 PROCEDURE — 85652 RBC SED RATE AUTOMATED: CPT

## 2023-06-08 PROCEDURE — 80053 COMPREHEN METABOLIC PANEL: CPT

## 2023-06-08 PROCEDURE — 83540 ASSAY OF IRON: CPT

## 2023-06-08 PROCEDURE — 86140 C-REACTIVE PROTEIN: CPT

## 2023-06-08 PROCEDURE — 85025 COMPLETE CBC W/AUTO DIFF WBC: CPT

## 2023-06-08 PROCEDURE — 83550 IRON BINDING TEST: CPT

## 2023-06-08 PROCEDURE — 36415 COLL VENOUS BLD VENIPUNCTURE: CPT

## 2023-06-08 PROCEDURE — 82728 ASSAY OF FERRITIN: CPT

## 2023-06-22 ENCOUNTER — HOSPITAL ENCOUNTER (OUTPATIENT)
Dept: HOSPITAL 47 - RADMAMWWP | Age: 78
Discharge: HOME | End: 2023-06-22
Attending: INTERNAL MEDICINE
Payer: MEDICARE

## 2023-06-22 DIAGNOSIS — Z78.0: ICD-10-CM

## 2023-06-22 DIAGNOSIS — Z12.31: Primary | ICD-10-CM

## 2023-06-22 PROCEDURE — 77063 BREAST TOMOSYNTHESIS BI: CPT

## 2023-06-22 PROCEDURE — 77067 SCR MAMMO BI INCL CAD: CPT

## 2023-06-23 NOTE — MM
Reason for Exam: Screening  (asymptomatic). 

Last mammogram was performed 2 year(s) and 7 month(s) ago. 





Patient History: 

Menarche at age 12. Patient has no children. Postmenopausal. 





Risk Values: 

Shahana 5 year model risk: 1.9%.

NCI Lifetime model risk: 3.7%.





Prior Study Comparison: 

11/30/2020 Bilateral Screening Mammogram, [facility]. 





Tissue Density: 

The breast tissue is heterogeneously dense. This may lower the sensitivity of mammography.





Findings: 

Analyzed By CAD. 

There is no suspicious group of microcalcifications or new suspicious mass in either breast. 





Overall Assessment: Benign, BI-RAD 2





Management: 

Screening Mammogram of both breasts in 1 year.

.



Patient should continue monthly self-breast exams.  A clinical breast exam by your physician is

recommended on an annual basis.

This exam should not preclude additional follow-up of suspicious palpable abnormalities.



Note on Shahana scores and lifetime risk:

1. A Shahana score greater than 3% is considered moderate risk. If this is the case, consider

specialist referral to assess eligibility for a risk reducing agent.

2. If overall lifetime risk for the development of breast cancer is 20% or higher, the patient may

qualify for future screening with alternating mammogram and breast MRI.



Electronically signed and approved by: Leopold M. Fregoli, M.D. Radiologis

## 2023-07-05 ENCOUNTER — HOSPITAL ENCOUNTER (OUTPATIENT)
Dept: HOSPITAL 47 - RADUSWWP | Age: 78
Discharge: HOME | End: 2023-07-05
Attending: FAMILY MEDICINE
Payer: MEDICARE

## 2023-07-05 DIAGNOSIS — L97.312: ICD-10-CM

## 2023-07-05 DIAGNOSIS — I87.311: Primary | ICD-10-CM

## 2023-07-05 PROCEDURE — 93922 UPR/L XTREMITY ART 2 LEVELS: CPT

## 2023-07-05 PROCEDURE — 93970 EXTREMITY STUDY: CPT

## 2023-07-05 NOTE — US
EXAMINATION TYPE: US arterial LE single level

 

DATE OF EXAM: 7/5/2023 2:30 PM

 

CLINICAL INDICATION: Female, 77 years old with history of L97.312,I87.2 VENOUS INSU,I87.311CHRONIC VE
NOUS HYPERTENSION;

 

History of:

Smoker: No 

Hypertension:  Yes

Diabetic:  No

Hyperlipidemia:  No

TIA/CVA:  No

Previous Vascular Surgery:  No

 

Doppler Waveforms: 

Right: Multiphasic

Left: Multiphasic

 

 

Right Brachial Pressure:  155

Left Brachial Pressure:  150

 

Ankle-Brachial Indices:

Right: 1.03

Left: 1.10

 

Toe Brachial Indices:

Right: 0.94

Left: 0.89

 

 

 

IMPRESSION:  

1. Normal MICHAEL and TBI indices.

## 2023-07-05 NOTE — US
EXAMINATION TYPE: US venous doppler duplex LE 

 

DATE OF EXAM: 7/5/2023 1:59 PM

 

COMPARISON: US 2023

 

CLINICAL INDICATION: Female, 77 years old with history of L97.312,I87.2 VENOUS INSU,I87.311CHRONIC VE
NOUS HYPERTENSION;

 

SIDE PERFORMED: Bilateral  

 

TECHNIQUE:  The lower extremity deep venous system is examined utilizing real time linear array sonog
sujatha with graded compression, doppler sonography and color-flow sonography.

 

VESSELS IMAGED:

Common Femoral Vein

Deep Femoral Vein

Greater Saphenous Vein *

Femoral Vein

Popliteal Vein

Small Saphenous Vein *

Proximal Calf Veins

(* superficial vessels)

 

**Difficult and limited study due to patient tremors

 

Right Leg:  Positive for DVT femoral vein and popliteal vein

 

Left Leg:  Appears negative for DVT

 

 

 

IMPRESSION:

1. DVT right lower extremity as noted above.

2. No evidence for left-sided DVT at this time.

## 2023-10-13 ENCOUNTER — HOSPITAL ENCOUNTER (OUTPATIENT)
Dept: HOSPITAL 47 - LABPRL | Age: 78
Discharge: HOME | End: 2023-10-13
Attending: INTERNAL MEDICINE
Payer: MEDICARE

## 2023-10-13 DIAGNOSIS — N39.0: Primary | ICD-10-CM

## 2023-10-13 PROCEDURE — 87086 URINE CULTURE/COLONY COUNT: CPT

## 2023-10-13 PROCEDURE — 81001 URINALYSIS AUTO W/SCOPE: CPT

## 2023-10-14 LAB
PH UR: 5.5 [PH]
SP GR UR: 1.01 (ref 1–1.03)
UROBILINOGEN UR QL: 0.2 E.U./DL